# Patient Record
Sex: FEMALE | Race: WHITE | NOT HISPANIC OR LATINO | Employment: PART TIME | URBAN - METROPOLITAN AREA
[De-identification: names, ages, dates, MRNs, and addresses within clinical notes are randomized per-mention and may not be internally consistent; named-entity substitution may affect disease eponyms.]

---

## 2017-06-30 ENCOUNTER — OFFICE VISIT (OUTPATIENT)
Dept: FAMILY MEDICINE | Facility: CLINIC | Age: 52
End: 2017-06-30
Payer: COMMERCIAL

## 2017-06-30 ENCOUNTER — LAB VISIT (OUTPATIENT)
Dept: LAB | Facility: HOSPITAL | Age: 52
End: 2017-06-30
Attending: FAMILY MEDICINE
Payer: COMMERCIAL

## 2017-06-30 VITALS
OXYGEN SATURATION: 97 % | BODY MASS INDEX: 29.74 KG/M2 | RESPIRATION RATE: 14 BRPM | DIASTOLIC BLOOD PRESSURE: 78 MMHG | SYSTOLIC BLOOD PRESSURE: 100 MMHG | WEIGHT: 161.63 LBS | HEIGHT: 62 IN | TEMPERATURE: 98 F | HEART RATE: 61 BPM

## 2017-06-30 DIAGNOSIS — Z00.00 WELL WOMAN EXAM WITHOUT GYNECOLOGICAL EXAM: Primary | ICD-10-CM

## 2017-06-30 DIAGNOSIS — Z00.00 WELL WOMAN EXAM WITHOUT GYNECOLOGICAL EXAM: ICD-10-CM

## 2017-06-30 DIAGNOSIS — R63.5 WEIGHT GAIN, ABNORMAL: Primary | ICD-10-CM

## 2017-06-30 LAB
ALBUMIN SERPL BCP-MCNC: 3.9 G/DL
ALP SERPL-CCNC: 48 U/L
ALT SERPL W/O P-5'-P-CCNC: 15 U/L
ANION GAP SERPL CALC-SCNC: 5 MMOL/L
AST SERPL-CCNC: 21 U/L
BASOPHILS # BLD AUTO: 0.02 K/UL
BASOPHILS NFR BLD: 0.3 %
BILIRUB SERPL-MCNC: 0.4 MG/DL
BUN SERPL-MCNC: 12 MG/DL
CALCIUM SERPL-MCNC: 9.6 MG/DL
CHLORIDE SERPL-SCNC: 109 MMOL/L
CHOLEST/HDLC SERPL: 3.1 {RATIO}
CO2 SERPL-SCNC: 30 MMOL/L
CREAT SERPL-MCNC: 0.9 MG/DL
DIFFERENTIAL METHOD: ABNORMAL
EOSINOPHIL # BLD AUTO: 0.1 K/UL
EOSINOPHIL NFR BLD: 1.4 %
ERYTHROCYTE [DISTWIDTH] IN BLOOD BY AUTOMATED COUNT: 13.1 %
EST. GFR  (AFRICAN AMERICAN): >60 ML/MIN/1.73 M^2
EST. GFR  (NON AFRICAN AMERICAN): >60 ML/MIN/1.73 M^2
GLUCOSE SERPL-MCNC: 100 MG/DL
HCT VFR BLD AUTO: 38.5 %
HDL/CHOLESTEROL RATIO: 32.2 %
HDLC SERPL-MCNC: 227 MG/DL
HDLC SERPL-MCNC: 73 MG/DL
HGB BLD-MCNC: 12.4 G/DL
LDLC SERPL CALC-MCNC: 139.8 MG/DL
LYMPHOCYTES # BLD AUTO: 1.6 K/UL
LYMPHOCYTES NFR BLD: 23.3 %
MCH RBC QN AUTO: 31.2 PG
MCHC RBC AUTO-ENTMCNC: 32.2 %
MCV RBC AUTO: 97 FL
MONOCYTES # BLD AUTO: 0.6 K/UL
MONOCYTES NFR BLD: 9.5 %
NEUTROPHILS # BLD AUTO: 4.3 K/UL
NEUTROPHILS NFR BLD: 65.3 %
NONHDLC SERPL-MCNC: 154 MG/DL
PLATELET # BLD AUTO: 236 K/UL
PMV BLD AUTO: 11.2 FL
POTASSIUM SERPL-SCNC: 4.6 MMOL/L
PROT SERPL-MCNC: 7.1 G/DL
RBC # BLD AUTO: 3.98 M/UL
SODIUM SERPL-SCNC: 144 MMOL/L
T4 FREE SERPL-MCNC: 0.86 NG/DL
TRIGL SERPL-MCNC: 71 MG/DL
TSH SERPL DL<=0.005 MIU/L-ACNC: 1.57 UIU/ML
WBC # BLD AUTO: 6.64 K/UL

## 2017-06-30 PROCEDURE — 86803 HEPATITIS C AB TEST: CPT

## 2017-06-30 PROCEDURE — 85025 COMPLETE CBC W/AUTO DIFF WBC: CPT

## 2017-06-30 PROCEDURE — 80061 LIPID PANEL: CPT

## 2017-06-30 PROCEDURE — 36415 COLL VENOUS BLD VENIPUNCTURE: CPT | Mod: PN

## 2017-06-30 PROCEDURE — 84443 ASSAY THYROID STIM HORMONE: CPT

## 2017-06-30 PROCEDURE — 99999 PR PBB SHADOW E&M-EST. PATIENT-LVL III: CPT | Mod: PBBFAC,,, | Performed by: FAMILY MEDICINE

## 2017-06-30 PROCEDURE — 80053 COMPREHEN METABOLIC PANEL: CPT

## 2017-06-30 PROCEDURE — 84439 ASSAY OF FREE THYROXINE: CPT

## 2017-06-30 PROCEDURE — 99386 PREV VISIT NEW AGE 40-64: CPT | Mod: S$GLB,,, | Performed by: FAMILY MEDICINE

## 2017-06-30 NOTE — PROGRESS NOTES
"Well Woman VISIT      CHIEF COMPLAINT  Chief Complaint   Patient presents with    Establish Care    Foot Swelling     feet swelling at the end of the day.        HPI  Mary Blair is a 52 y.o. female who presents for physical.     Social Factors  Tobacco use: No  Ready to Quit: No  Alcohol: No  Intimate partner violence screening  "Do you feel safe in your current relationship?" Yes   "Have you ever been in a relationship in which your partner frightened you or hurt you?" No  Living Will/POA: No  Regular Exercise: Yes     Depression  Over the past two weeks, have you felt down, depressed, or hopeless? No  Over the past two weeks, have you felt little interest or pleasure in doing things? No    Reproductive Health  Followed by Dr. E. Labadie    CHD, HTN, DM2  CHD Risk Factors: none  Women 45 years and older should be screened for dyslipidemia if at increased risk of CHD  Women 20 to 45 years of age should be screened for dyslipidemia if at increased risk of CHD  Asymptomatic adults with sustained blood pressure greater than 135/80 mm Hg (treated or untreated) should be screened for type 2 diabetes mellitus    Estimated body mass index is 29.56 kg/m² as calculated from the following:    Height as of this encounter: 5' 2" (1.575 m).    Weight as of this encounter: 73.3 kg (161 lb 9.6 oz).      Screening  Mammogram needed: UTD per patient  Colonoscopy needed: UTD per patient  Osteoporosis screen needed: n/a     Women 50 to 74 years of age should be screened for breast cancer with mammography biennially.  Women should be screened for cervical cancer with Pap tests beginning at 21 years of age. Low-risk women should receive Pap testing every three years. Co-testing for human papillomavirus is an option beginning at 30 years of age, and can extend the screening interval to five years. Cervical cancer screening should be discontinued at 65 years of age or after total hysterectomy if the woman has a benign " "gynecologic history  Adults 50 to 75 years of age should be screened for colorectal cancer with an FOBT annually, sigmoidoscopy every five years with an FOBT every three years, or colonoscopy every 10 years.  Women 65 years and older should be screened for osteoporosis. Women younger than 65 years should be screened if the risk of fracture is greater than or equal to that of a 65-year-old white woman without additional risk factors.      Immunizations  stated as up to date, no records available    ALLERGIES and MEDS were verified.   PMHx, PSHx, FHx, SOCIALHx were updated as pertinent.    REVIEW OF SYSTEMS  Review of Systems   Constitutional: Negative.    HENT: Negative.    Eyes: Negative.    Respiratory: Negative.    Cardiovascular: Positive for leg swelling.   Gastrointestinal: Negative.    Genitourinary: Negative.    Musculoskeletal: Negative.    Skin: Negative.    Neurological: Negative.          PHYSICAL EXAM  VITAL SIGNS: /78 (BP Location: Left arm, Patient Position: Sitting, BP Method: Manual)   Pulse 61   Temp 98.1 °F (36.7 °C) (Oral)   Resp 14   Ht 5' 2" (1.575 m)   Wt 73.3 kg (161 lb 9.6 oz)   SpO2 97%   BMI 29.56 kg/m²   GEN: Well developed, Well nourished, No acute distress.  HENT: Normocephalic, Atraumatic, Bilateral external ears normal, Nose normal, Oropharynx moist, No oral exudates.   Eyes: PERRL, EOMI, Conjunctiva normal, No discharge.   Neck: Supple, No tenderness.  Lymphatic: No cervical or supraclavicular lymphadenopathy noted.   Cardiovascular: Normal heart rate, Normal rhythm, No murmurs, No rubs, No gallops.   Thorax & Lungs: Normal breath sounds, No respiratory distress, No wheezing.  Abdomen: Soft, No tenderness, Bowel sounds normal.  Breast: deferred  Genital:deferred   Skin: Warm, Dry, No erythema, No rash.   Extremities: No edema, No tenderness.       ASSESSMENT/PLAN    Mary was seen today for establish care and foot swelling.    Diagnoses and all orders for this " visit:    Well woman exam without gynecological exam  -     CBC auto differential; Future  -     Comprehensive metabolic panel; Future  -     Lipid panel; Future  -     TSH; Future  -     T4, free; Future  -     Hepatitis C antibody; Future  -     Urinalysis; Future           FOLLOW UP: 1 year or sooner if needed    Aldo Menard MD

## 2017-07-03 ENCOUNTER — PATIENT MESSAGE (OUTPATIENT)
Dept: FAMILY MEDICINE | Facility: CLINIC | Age: 52
End: 2017-07-03

## 2017-07-03 LAB — HCV AB SERPL QL IA: NEGATIVE

## 2018-01-05 DIAGNOSIS — Z12.11 COLON CANCER SCREENING: ICD-10-CM

## 2018-08-29 ENCOUNTER — OFFICE VISIT (OUTPATIENT)
Dept: OBSTETRICS AND GYNECOLOGY | Facility: CLINIC | Age: 53
End: 2018-08-29
Payer: COMMERCIAL

## 2018-08-29 VITALS
DIASTOLIC BLOOD PRESSURE: 78 MMHG | HEIGHT: 62 IN | SYSTOLIC BLOOD PRESSURE: 132 MMHG | WEIGHT: 168.63 LBS | BODY MASS INDEX: 31.03 KG/M2

## 2018-08-29 DIAGNOSIS — Z01.419 WELL WOMAN EXAM WITH ROUTINE GYNECOLOGICAL EXAM: Primary | ICD-10-CM

## 2018-08-29 DIAGNOSIS — Z78.0 MENOPAUSE: ICD-10-CM

## 2018-08-29 DIAGNOSIS — Z12.4 PAP SMEAR FOR CERVICAL CANCER SCREENING: ICD-10-CM

## 2018-08-29 PROCEDURE — 99386 PREV VISIT NEW AGE 40-64: CPT | Mod: S$GLB,,, | Performed by: OBSTETRICS & GYNECOLOGY

## 2018-08-29 PROCEDURE — 88175 CYTOPATH C/V AUTO FLUID REDO: CPT

## 2018-08-29 PROCEDURE — 99999 PR PBB SHADOW E&M-EST. PATIENT-LVL III: CPT | Mod: PBBFAC,,, | Performed by: OBSTETRICS & GYNECOLOGY

## 2018-08-29 NOTE — PROGRESS NOTES
"Subjective:       Patient ID: Mary Blair is a 53 y.o. female.    Chief Complaint:  Well Woman      History of Present Illness  HPI  This 53 yr old female is here for routine WWE and is new to me.  Was seeing Dr Labadie on [facility].   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    She had pap in last couple of yrs and just had mammogram that was normal.    She has not had a DVT but was seen in ER and ruled out recently  She had C/S x 4 and BTL  She has never had abnormal pap or mammogram  She is taking Duavee and has been on for 3 yrs  .  Heme /onc knows she is on Duavee.       GYN & OB History  No LMP recorded. Patient is postmenopausal.   Date of Last Pap: No result found    OB History   No data available       Review of Systems  Review of Systems   Constitutional: Negative for chills and fever.   Respiratory: Negative for shortness of breath.    Cardiovascular: Negative for chest pain.   Gastrointestinal: Negative for abdominal pain, nausea and vomiting.   Genitourinary: Negative for difficulty urinating, dyspareunia, genital sores, menstrual problem, pelvic pain, vaginal bleeding, vaginal discharge and vaginal pain.   Musculoskeletal: Positive for back pain.   Skin: Negative for wound.   Hematological: Negative for adenopathy.           Objective:   Physical Exam:   Constitutional: She is oriented to person, place, and time. She appears well-developed and well-nourished.    HENT:   Head: Normocephalic.    Eyes: EOM are normal.    Neck: Normal range of motion.    Cardiovascular: Normal rate.     Pulmonary/Chest: Effort normal. She exhibits no mass and no tenderness. Right breast exhibits no inverted nipple, no mass, no skin change and no tenderness. Left breast exhibits no inverted nipple, no mass, no skin change and no tenderness.        Abdominal: " Soft. She exhibits no distension. There is no tenderness.     Genitourinary: Vagina normal and uterus normal. There is no rash, tenderness or lesion on the right labia. There is no rash, tenderness or lesion on the left labia. Uterus is not tender. Cervix is normal. Right adnexum displays no mass, no tenderness and no fullness. Left adnexum displays no mass, no tenderness and no fullness. Cervix exhibits no discharge.           Musculoskeletal: Normal range of motion.       Neurological: She is alert and oriented to person, place, and time.    Skin: Skin is warm and dry.    Psychiatric: She has a normal mood and affect.          Assessment:        1. Well woman exam with routine gynecological exam    2. Pap smear for cervical cancer screening    3. Menopause               Plan:      Change to estratest and prometrium  Mammogram yearly  Pap every other yr.

## 2018-09-06 ENCOUNTER — PATIENT MESSAGE (OUTPATIENT)
Dept: OBSTETRICS AND GYNECOLOGY | Facility: CLINIC | Age: 53
End: 2018-09-06

## 2018-09-06 DIAGNOSIS — Z78.0 MENOPAUSE: Primary | ICD-10-CM

## 2018-09-06 RX ORDER — PROGESTERONE 100 MG/1
100 CAPSULE ORAL NIGHTLY
Qty: 90 CAPSULE | Refills: 3 | Status: SHIPPED | OUTPATIENT
Start: 2018-09-06 | End: 2019-01-29

## 2018-09-06 RX ORDER — ESTERIFIED ESTROGEN AND METHYLTESTOSTERONE 1.25; 2.5 MG/1; MG/1
1 TABLET ORAL DAILY
Qty: 30 TABLET | Refills: 5 | Status: SHIPPED | OUTPATIENT
Start: 2018-09-06 | End: 2018-09-07 | Stop reason: SDUPTHER

## 2018-09-07 DIAGNOSIS — Z78.0 MENOPAUSE: ICD-10-CM

## 2018-09-07 RX ORDER — ESTERIFIED ESTROGEN AND METHYLTESTOSTERONE 1.25; 2.5 MG/1; MG/1
1 TABLET ORAL DAILY
Qty: 30 TABLET | Refills: 5 | Status: SHIPPED | OUTPATIENT
Start: 2018-09-07 | End: 2019-01-29

## 2018-11-09 ENCOUNTER — TELEPHONE (OUTPATIENT)
Dept: OBSTETRICS AND GYNECOLOGY | Facility: CLINIC | Age: 53
End: 2018-11-09

## 2018-11-09 NOTE — TELEPHONE ENCOUNTER
----- Message from Venessa Vega sent at 11/9/2018  2:14 PM CST -----  Contact: ANDRES HORNER [[id number]]            Name of Who is Calling: ANDRES HORNER [[id number]]    What is the request in detail: Returning a call.      Can the clinic reply by [facility]: no      What Number to Call Back if not in [facility]: [phone]

## 2018-11-28 DIAGNOSIS — Z12.39 BREAST CANCER SCREENING: ICD-10-CM

## 2018-12-07 ENCOUNTER — LAB VISIT (OUTPATIENT)
Dept: LAB | Facility: HOSPITAL | Age: 53
End: 2018-12-07
Attending: FAMILY MEDICINE
Payer: COMMERCIAL

## 2018-12-07 DIAGNOSIS — Z12.11 COLON CANCER SCREENING: ICD-10-CM

## 2018-12-07 LAB — HEMOCCULT STL QL IA: NEGATIVE

## 2018-12-07 PROCEDURE — 82274 ASSAY TEST FOR BLOOD FECAL: CPT

## 2019-01-23 ENCOUNTER — TELEPHONE (OUTPATIENT)
Dept: HEMATOLOGY/ONCOLOGY | Facility: CLINIC | Age: 54
End: 2019-01-23

## 2019-01-23 NOTE — TELEPHONE ENCOUNTER
Received referral to Dr Perez from Dr Vieyra for plasmacytoma. Requested additional data which were faxed today.  Explained to Lila (RN at [facility]) that we will schedule with Blood & Marrow specialist.    Spoke with patient & scheduled for 1/29.  Gave directions. Notified referring office.  Questioned pt if she has had work up for hematuria.  Pt said urologist to do scope on the 28th and she had CT scan on 21st.    =========================  From: [email or web address]   Sent: Wednesday, January 23, 2019 10:02 AM  To: Cyndie Ruelas [email or web address]>  Subject:] Josh referral    Good morning. It is ok to schedule her with a different provider. I have attached all the labs and DI reports to this email (its a lot). If any more information is needed let me know. Thanks.    Lila Wagner RN  [facility]  Office: [phone]  Desk: [phone]  Fax: [phone]

## 2019-01-29 ENCOUNTER — LAB VISIT (OUTPATIENT)
Dept: LAB | Facility: HOSPITAL | Age: 54
End: 2019-01-29
Attending: STUDENT IN AN ORGANIZED HEALTH CARE EDUCATION/TRAINING PROGRAM
Payer: COMMERCIAL

## 2019-01-29 ENCOUNTER — INITIAL CONSULT (OUTPATIENT)
Dept: HEMATOLOGY/ONCOLOGY | Facility: CLINIC | Age: 54
End: 2019-01-29
Payer: COMMERCIAL

## 2019-01-29 VITALS
HEART RATE: 83 BPM | SYSTOLIC BLOOD PRESSURE: 141 MMHG | TEMPERATURE: 98 F | DIASTOLIC BLOOD PRESSURE: 84 MMHG | OXYGEN SATURATION: 95 % | RESPIRATION RATE: 18 BRPM | WEIGHT: 179.88 LBS | BODY MASS INDEX: 33.1 KG/M2 | HEIGHT: 62 IN

## 2019-01-29 DIAGNOSIS — C90.30 PLASMACYTOMA: ICD-10-CM

## 2019-01-29 DIAGNOSIS — C90.30 PLASMACYTOMA: Primary | ICD-10-CM

## 2019-01-29 DIAGNOSIS — M54.16 LUMBAR RADICULOPATHY, CHRONIC: ICD-10-CM

## 2019-01-29 DIAGNOSIS — G56.03 BILATERAL CARPAL TUNNEL SYNDROME: ICD-10-CM

## 2019-01-29 DIAGNOSIS — G95.9 CERVICAL MYELOPATHY: ICD-10-CM

## 2019-01-29 DIAGNOSIS — M54.9 BACK PAIN, UNSPECIFIED BACK LOCATION, UNSPECIFIED BACK PAIN LATERALITY, UNSPECIFIED CHRONICITY: ICD-10-CM

## 2019-01-29 DIAGNOSIS — Z92.3 HISTORY OF RADIATION EXPOSURE TO SPINE: ICD-10-CM

## 2019-01-29 DIAGNOSIS — M67.912 DYSFUNCTION OF LEFT ROTATOR CUFF: ICD-10-CM

## 2019-01-29 LAB
ALBUMIN SERPL BCP-MCNC: 3.6 G/DL
ALP SERPL-CCNC: 70 U/L
ALT SERPL W/O P-5'-P-CCNC: 14 U/L
ANION GAP SERPL CALC-SCNC: 8 MMOL/L
AST SERPL-CCNC: 19 U/L
BASOPHILS # BLD AUTO: 0.03 K/UL
BASOPHILS NFR BLD: 0.6 %
BILIRUB SERPL-MCNC: 0.2 MG/DL
BUN SERPL-MCNC: 12 MG/DL
CALCIUM SERPL-MCNC: 9.3 MG/DL
CHLORIDE SERPL-SCNC: 105 MMOL/L
CO2 SERPL-SCNC: 27 MMOL/L
CREAT SERPL-MCNC: 0.8 MG/DL
DIFFERENTIAL METHOD: ABNORMAL
EOSINOPHIL # BLD AUTO: 0.1 K/UL
EOSINOPHIL NFR BLD: 1.1 %
ERYTHROCYTE [DISTWIDTH] IN BLOOD BY AUTOMATED COUNT: 13 %
EST. GFR  (AFRICAN AMERICAN): >60 ML/MIN/1.73 M^2
EST. GFR  (NON AFRICAN AMERICAN): >60 ML/MIN/1.73 M^2
GLUCOSE SERPL-MCNC: 105 MG/DL
HCT VFR BLD AUTO: 37.6 %
HGB BLD-MCNC: 12.4 G/DL
IGA SERPL-MCNC: 71 MG/DL
IGG SERPL-MCNC: 763 MG/DL
IGM SERPL-MCNC: 97 MG/DL
IMM GRANULOCYTES # BLD AUTO: 0.01 K/UL
IMM GRANULOCYTES NFR BLD AUTO: 0.2 %
LYMPHOCYTES # BLD AUTO: 0.9 K/UL
LYMPHOCYTES NFR BLD: 16.4 %
MAGNESIUM SERPL-MCNC: 2.1 MG/DL
MCH RBC QN AUTO: 31.2 PG
MCHC RBC AUTO-ENTMCNC: 33 G/DL
MCV RBC AUTO: 95 FL
MONOCYTES # BLD AUTO: 0.5 K/UL
MONOCYTES NFR BLD: 10.1 %
NEUTROPHILS # BLD AUTO: 3.8 K/UL
NEUTROPHILS NFR BLD: 71.6 %
NRBC BLD-RTO: 0 /100 WBC
PHOSPHATE SERPL-MCNC: 4.4 MG/DL
PLATELET # BLD AUTO: 226 K/UL
PMV BLD AUTO: 9.8 FL
POTASSIUM SERPL-SCNC: 4.1 MMOL/L
PROT SERPL-MCNC: 6.9 G/DL
RBC # BLD AUTO: 3.97 M/UL
SODIUM SERPL-SCNC: 140 MMOL/L
WBC # BLD AUTO: 5.25 K/UL

## 2019-01-29 PROCEDURE — 84165 PROTEIN E-PHORESIS SERUM: CPT

## 2019-01-29 PROCEDURE — 36415 COLL VENOUS BLD VENIPUNCTURE: CPT

## 2019-01-29 PROCEDURE — 3008F BODY MASS INDEX DOCD: CPT | Mod: CPTII,S$GLB,, | Performed by: STUDENT IN AN ORGANIZED HEALTH CARE EDUCATION/TRAINING PROGRAM

## 2019-01-29 PROCEDURE — 84165 PATHOLOGIST INTERPRETATION SPE: ICD-10-PCS | Mod: 26,,, | Performed by: PATHOLOGY

## 2019-01-29 PROCEDURE — 99204 PR OFFICE/OUTPT VISIT, NEW, LEVL IV, 45-59 MIN: ICD-10-PCS | Mod: S$GLB,,, | Performed by: STUDENT IN AN ORGANIZED HEALTH CARE EDUCATION/TRAINING PROGRAM

## 2019-01-29 PROCEDURE — 84100 ASSAY OF PHOSPHORUS: CPT

## 2019-01-29 PROCEDURE — 84165 PROTEIN E-PHORESIS SERUM: CPT | Mod: 26,,, | Performed by: PATHOLOGY

## 2019-01-29 PROCEDURE — 86334 IMMUNOFIX E-PHORESIS SERUM: CPT | Mod: 26,,, | Performed by: PATHOLOGY

## 2019-01-29 PROCEDURE — 86334 IMMUNOFIX E-PHORESIS SERUM: CPT

## 2019-01-29 PROCEDURE — 99999 PR PBB SHADOW E&M-EST. PATIENT-LVL III: CPT | Mod: PBBFAC,,, | Performed by: STUDENT IN AN ORGANIZED HEALTH CARE EDUCATION/TRAINING PROGRAM

## 2019-01-29 PROCEDURE — 86334 PATHOLOGIST INTERPRETATION IFE: ICD-10-PCS | Mod: 26,,, | Performed by: PATHOLOGY

## 2019-01-29 PROCEDURE — 85025 COMPLETE CBC W/AUTO DIFF WBC: CPT

## 2019-01-29 PROCEDURE — 80053 COMPREHEN METABOLIC PANEL: CPT

## 2019-01-29 PROCEDURE — 99204 OFFICE O/P NEW MOD 45 MIN: CPT | Mod: S$GLB,,, | Performed by: STUDENT IN AN ORGANIZED HEALTH CARE EDUCATION/TRAINING PROGRAM

## 2019-01-29 PROCEDURE — 99999 PR PBB SHADOW E&M-EST. PATIENT-LVL III: ICD-10-PCS | Mod: PBBFAC,,, | Performed by: STUDENT IN AN ORGANIZED HEALTH CARE EDUCATION/TRAINING PROGRAM

## 2019-01-29 PROCEDURE — 83735 ASSAY OF MAGNESIUM: CPT

## 2019-01-29 PROCEDURE — 3008F PR BODY MASS INDEX (BMI) DOCUMENTED: ICD-10-PCS | Mod: CPTII,S$GLB,, | Performed by: STUDENT IN AN ORGANIZED HEALTH CARE EDUCATION/TRAINING PROGRAM

## 2019-01-29 PROCEDURE — 82784 ASSAY IGA/IGD/IGG/IGM EACH: CPT | Mod: 59

## 2019-01-29 PROCEDURE — 83520 IMMUNOASSAY QUANT NOS NONAB: CPT | Mod: 59

## 2019-01-29 RX ORDER — DULOXETIN HYDROCHLORIDE 60 MG/1
60 CAPSULE, DELAYED RELEASE ORAL DAILY
COMMUNITY
Start: 2018-09-26

## 2019-01-29 RX ORDER — DULOXETIN HYDROCHLORIDE 60 MG/1
CAPSULE, DELAYED RELEASE ORAL
COMMUNITY
Start: 2018-11-14 | End: 2019-01-29

## 2019-01-29 RX ORDER — CEFUROXIME AXETIL 500 MG/1
TABLET ORAL
Refills: 0 | COMMUNITY
Start: 2019-01-17 | End: 2019-01-29

## 2019-01-29 RX ORDER — OMEPRAZOLE 20 MG/1
CAPSULE, DELAYED RELEASE ORAL
Refills: 3 | COMMUNITY
Start: 2019-01-14 | End: 2019-01-29

## 2019-01-29 RX ORDER — ESTERIFIED ESTROGEN AND METHYLTESTOSTERONE 1.25; 2.5 MG/1; MG/1
1 TABLET ORAL DAILY
COMMUNITY
End: 2019-02-19

## 2019-01-29 RX ORDER — PROPRANOLOL HYDROCHLORIDE 10 MG/1
10 TABLET ORAL DAILY
Refills: 0 | COMMUNITY
Start: 2018-11-16 | End: 2019-01-29

## 2019-01-29 NOTE — MEDICAL/APP STUDENT
"Subjective:       Patient ID: Mary Blair is a 53 y.o. female.    Chief Complaint: No chief complaint on file.    HPI     Mary Blair is a 53M previously followed at [facility] with plasmacytoma diagnosed in     here to establish care at [facility].    2018 - NM bone/joint whole body   - Increased uptake within L3 body corresponding to lytic lesion seen on 18 CT  3/8/2018 - Bone marrow biopsy negative   3/8/2018 - MRI   3/21/2018 - Skeletal survey   - "Periprosthetic lucency associated with the left proximal femur potentially reflecting osteolysis rather than malignancy"    March-May 2018   - Patient had ratiation threapy to spine.  Window includes L3, L4, and L5 due to suspicion for invasion of adjacent vertebrae.    2018 - L-spine   - no suspicious osseous lesions   - posterior spinal fusion L2-L4.  2018 - MIRNA + UPEP   - Total protein urine: 77 (nl. )   - Normal immunofixation pattern     - K+L    Kappa: 6.42    Lambda: 0.20    K:L: 32.1  2018 - CT abdo/pelvis  2018 - CT Thoracic Spine W/WO   - No suspicious osseous lesions identified.    2018 Labs   - B2 microglobulin:  1.8 mg/L (ref 0.6-2.4)   - CBC:    WBC: 3.8    Hb: 12.5    Plt: 199   - CMP:    All wnl    Ca: 9.4     - Immunoglobulins:    Ig Ref 700-1600    IgM: 103 Ref     IgA: 81.4 Ref        - LDH: 178 ()   - SPEP    Total protein normal.  Apparent normal immunofixation pattern   - Kappa/Joaquín    Kappa: 8.5    Lambda: 10.6    K:L ratio: 0.8   - CRP <0.3   - ESR 20  18 - MRI C-spine WO   - No focal lesions observed  18 - MRI L-Spine W/WO   - No focal lesions observed  18 - MRI brain W/Wo   - Normal study  10/24/2018   - Kappa: 7.6   - Lambda: 10.6   - K:L ratio: 0.72    2019 Last outside note shows patient still has back pains, and has blood in urine.              Review of Systems    Objective:      Physical Exam    Assessment:       No diagnosis found.    Plan:     "   Medical student note

## 2019-01-29 NOTE — Clinical Note
Blood work today including CBC, CMP, Mg, Phos, Immunofix, spep, free LT chains,  immunoglobulinsSchedule for PET/CT Follow up in 2 weeks Feb 12, 2019

## 2019-01-29 NOTE — PROGRESS NOTES
"PATIENT: Mary Blair  MRN: [id number]  DATE: 2/3/2019      Diagnosis:   1. Plasmacytoma    2. Back pain, unspecified back location, unspecified back pain laterality, unspecified chronicity    3. History of radiation exposure to spine    4. Bilateral carpal tunnel syndrome    5. Cervical myelopathy    6. Dysfunction of left rotator cuff    7. Lumbar radiculopathy, chronic        Chief Complaint: Plastacytoma; Results; and Pain (back. pain scale 5/10)      Mary Blair is a 53M with PMHx of plasmacytoma of the lumbar spine previously treated at [facility] with plasmacytoma presented to our hematology clinic to establish care.    She initially presented with back pain in early 2018 and underwent bone scan on 2018 which revealed Increased uptake within L3 body corresponding to lytic lesion seen on 18 CT and underwent back surgery on 2018. A bone marrow biopsy done on 3/8/2018 was negative for any malignancy. However the biopsy of L3 vertebral body lesion revealed lambda restricted plasma cell neoplasm and skeletal survey done on 3/21/2018 revealed "Periprosthetic lucency associated with the left proximal femur potentially reflecting osteolysis rather than malignancy". She underwent ratiation threapy to L3, L4 and L5 of spine from March to May 8, 2018 for a total of 8 weeks due to suspicion for invasion of adjacent vertebrae.     2018 xray of L-spine revealed no suspicious osseous lesions and intact posterior spinal fusion L2-L4.  2018 - MIRNA + UPEP- Normal immunofixation pattern  2018 - CT abdo/pelvis- NO suspicious osseous lesions identified  2018 - CT Thoracic Spine W/WO - No suspicious osseous lesions identified.    2018 - B2 microglobulin:  1.8 mg/L (ref 0.6-2.4). CBC, CMP unremarkable   - Immunoglobulins:                          Ig      Ref 700-1600                          IgM:     103      Ref                           IgA:      81.4     Ref "                                         - LDH: 178 ()              - SPEP- Total protein normal.  Apparent normal immunofixation pattern              - Kappa/Joaquín                          Kappa: 8.5                          Lambda: 10.6                          K:L ratio: 0.8              - CRP <0.3              - ESR 20    18 - MRI C-spine WO - No focal lesions observed  18 - MRI L-Spine W/WO - No focal lesions observed  18 - MRI brain W/Wo- Normal study  10/24/2018 -  Normal immunoglobulins, LDH. Normal SPEP and MIRNA. No M spike seen.              - Kappa: 7.6              - Lambda: 10.6              - K:L ratio: 0.72     She c/o chronic back pain, denies any new complaints.     She has PMHx of L hip replacements in  and , B/L carpal tunnel s/p surgery, R rotator cuff tear and trigger finger. She is a non smoker and drinks alcohol socially    She has family history of skin cancer and lung in grand mother    Subjective:    Initial History: Ms. Blair is a 53 y.o. female who returns for follow up.     Past Medical History:   Past Medical History:   Diagnosis Date    Rotator cuff disorder     Spinal stenosis        Past Surgical HIstory:   Past Surgical History:   Procedure Laterality Date     SECTION      corpal tunnel      lubar sugery      ROTATOR CUFF REPAIR      TONSILLECTOMY      TOTAL HIP ARTHROPLASTY         Family History:   Family History   Problem Relation Age of Onset    Heart disease Mother     Heart disease Father        Social History:  reports that  has never smoked. she has never used smokeless tobacco. She reports that she does not use drugs.    Allergies:  Review of patient's allergies indicates:  No Known Allergies    Medications:  Current Outpatient Medications   Medication Sig Dispense Refill    DULoxetine (CYMBALTA) 60 MG capsule Take 60 mg by mouth once daily.       estrogens,conjugated,-methyltestosterone 1.25-2.5mg (ESTRATEST) 1.25-2.5  "mg per tablet Take 1 tablet by mouth once daily.       No current facility-administered medications for this visit.        Review of Systems   Constitutional: Negative for appetite change, chills, fatigue, fever and unexpected weight change.   HENT: Negative for nosebleeds.    Respiratory: Negative for cough and shortness of breath.    Cardiovascular: Negative for chest pain.   Gastrointestinal: Negative for abdominal pain, blood in stool, nausea and vomiting.   Genitourinary: Negative for dyspareunia, dysuria, flank pain, frequency and hematuria.   Musculoskeletal: Positive for back pain. Negative for myalgias.   Skin: Negative for rash.   Neurological: Negative for seizures, syncope and headaches.   Hematological: Negative for adenopathy. Does not bruise/bleed easily.   Psychiatric/Behavioral: Negative for agitation and behavioral problems.       ECOG Performance Status: 0   Objective:      Vitals:   Vitals:    01/29/19 1416   BP: (!) 141/84   BP Location: Left arm   Patient Position: Sitting   BP Method: Medium (Automatic)   Pulse: 83   Resp: 18   Temp: 98.1 °F (36.7 °C)   TempSrc: Oral   SpO2: 95%   Weight: 81.6 kg (179 lb 14.3 oz)   Height: 5' 2" (1.575 m)     BMI: Body mass index is 32.9 kg/m².    Physical Exam   Constitutional: She is oriented to person, place, and time. She appears well-developed and well-nourished.   HENT:   Head: Normocephalic and atraumatic.   Eyes: EOM are normal. Pupils are equal, round, and reactive to light.   Neck: Normal range of motion. Neck supple.   Cardiovascular: Normal rate and regular rhythm.   Pulmonary/Chest: Effort normal and breath sounds normal. No respiratory distress.   Abdominal: Soft. Bowel sounds are normal. She exhibits no distension. There is no tenderness.   Musculoskeletal: Normal range of motion. She exhibits no edema, tenderness or deformity.   Neurological: She is alert and oriented to person, place, and time. No cranial nerve deficit.   Skin: Skin is warm. " No rash noted.   Psychiatric: She has a normal mood and affect. Her behavior is normal.       Laboratory Data:  Lab Visit on 01/29/2019   Component Date Value Ref Range Status    Kappa Free Light Chains 01/29/2019 0.70  0.33 - 1.94 mg/dL Final    Lambda Free Light Chains 01/29/2019 1.03  0.57 - 2.63 mg/dL Final    Kappa/Lambda FLC Ratio 01/29/2019 0.68  0.26 - 1.65 Final    IgG - Serum 01/29/2019 763  650 - 1600 mg/dL Final    IgG Cord Blood Reference Range: 650-1600 mg/dL.    IgA 01/29/2019 71  40 - 350 mg/dL Final    IgA Cord Blood Reference Range: <5 mg/dL.    IgM 01/29/2019 97  50 - 300 mg/dL Final    IgM Cord Blood Reference Range: <25 mg/dL.    Immunofix Interp. 01/29/2019 SEE COMMENT   Final    Comment: Serum protein electrophoresis and immunofixation results should be   interpreted in clinical context in that some therapeutic agents can   result   in false positive results (example, daratumumab). Correlation with   the   patient s therapeutic regimen is required.  See pathologist's interpretation.      Protein, Serum 01/29/2019 6.6  6.0 - 8.4 g/dL Final    Comment: Serum protein electrophoresis and immunofixation results should be   interpreted in clinical context in that some therapeutic agents can   result   in false positive results (example, daratumumab). Correlation with   the   patient s therapeutic regimen is required.      Albumin grams/dl 01/29/2019 4.03  3.35 - 5.55 g/dL Final    Alpha-1 grams/dl 01/29/2019 0.36  0.17 - 0.41 g/dL Final    Alpha-2 grams/dl 01/29/2019 0.79  0.43 - 0.99 g/dL Final    Beta grams/dl 01/29/2019 0.67  0.50 - 1.10 g/dL Final    Gamma grams/dl 01/29/2019 0.75  0.67 - 1.58 g/dL Final    WBC 01/29/2019 5.25  3.90 - 12.70 K/uL Final    RBC 01/29/2019 3.97* 4.00 - 5.40 M/uL Final    Hemoglobin 01/29/2019 12.4  12.0 - 16.0 g/dL Final    Hematocrit 01/29/2019 37.6  37.0 - 48.5 % Final    MCV 01/29/2019 95  82 - 98 fL Final    MCH 01/29/2019 31.2* 27.0 - 31.0 pg  Final    MCHC 01/29/2019 33.0  32.0 - 36.0 g/dL Final    RDW 01/29/2019 13.0  11.5 - 14.5 % Final    Platelets 01/29/2019 226  150 - 350 K/uL Final    MPV 01/29/2019 9.8  9.2 - 12.9 fL Final    Immature Granulocytes 01/29/2019 0.2  0.0 - 0.5 % Final    Gran # (ANC) 01/29/2019 3.8  1.8 - 7.7 K/uL Final    Immature Grans (Abs) 01/29/2019 0.01  0.00 - 0.04 K/uL Final    Comment: Mild elevation in immature granulocytes is non specific and   can be seen in a variety of conditions including stress response,   acute inflammation, trauma and pregnancy. Correlation with other   laboratory and clinical findings is essential.      Lymph # 01/29/2019 0.9* 1.0 - 4.8 K/uL Final    Mono # 01/29/2019 0.5  0.3 - 1.0 K/uL Final    Eos # 01/29/2019 0.1  0.0 - 0.5 K/uL Final    Baso # 01/29/2019 0.03  0.00 - 0.20 K/uL Final    nRBC 01/29/2019 0  0 /100 WBC Final    Gran% 01/29/2019 71.6  38.0 - 73.0 % Final    Lymph% 01/29/2019 16.4* 18.0 - 48.0 % Final    Mono% 01/29/2019 10.1  4.0 - 15.0 % Final    Eosinophil% 01/29/2019 1.1  0.0 - 8.0 % Final    Basophil% 01/29/2019 0.6  0.0 - 1.9 % Final    Differential Method 01/29/2019 Automated   Final    Sodium 01/29/2019 140  136 - 145 mmol/L Final    Potassium 01/29/2019 4.1  3.5 - 5.1 mmol/L Final    Chloride 01/29/2019 105  95 - 110 mmol/L Final    CO2 01/29/2019 27  23 - 29 mmol/L Final    Glucose 01/29/2019 105  70 - 110 mg/dL Final    BUN, Bld 01/29/2019 12  6 - 20 mg/dL Final    Creatinine 01/29/2019 0.8  0.5 - 1.4 mg/dL Final    Calcium 01/29/2019 9.3  8.7 - 10.5 mg/dL Final    Total Protein 01/29/2019 6.9  6.0 - 8.4 g/dL Final    Albumin 01/29/2019 3.6  3.5 - 5.2 g/dL Final    Total Bilirubin 01/29/2019 0.2  0.1 - 1.0 mg/dL Final    Comment: For infants and newborns, interpretation of results should be based  on gestational age, weight and in agreement with clinical  observations.  Premature Infant recommended reference ranges:  Up to 24  hours.............<8.0 mg/dL  Up to 48 hours............<12.0 mg/dL  3-5 days..................<15.0 mg/dL  6-29 days.................<15.0 mg/dL      Alkaline Phosphatase 01/29/2019 70  55 - 135 U/L Final    AST 01/29/2019 19  10 - 40 U/L Final    ALT 01/29/2019 14  10 - 44 U/L Final    Anion Gap 01/29/2019 8  8 - 16 mmol/L Final    eGFR if African American 01/29/2019 >60.0  >60 mL/min/1.73 m^2 Final    eGFR if non African American 01/29/2019 >60.0  >60 mL/min/1.73 m^2 Final    Comment: Calculation used to obtain the estimated glomerular filtration  rate (eGFR) is the CKD-EPI equation.       Magnesium 01/29/2019 2.1  1.6 - 2.6 mg/dL Final    Phosphorus 01/29/2019 4.4  2.7 - 4.5 mg/dL Final    Pathologist Interpretation MIRNA 01/29/2019 REVIEWED   Final    Comment: Electronically reviewed and signed by:  Silvio Aalmo M.D.  Signed on 01/30/19 at 15:32  No monoclonal peaks identified.      Pathologist Interpretation SPE 01/29/2019 REVIEWED   Final    Comment: Electronically reviewed and signed by:  Silvio Alamo M.D.  Signed on 01/30/19 at 15:32  Normal total protein, normal pattern.           Imaging:    Assessment:       1. Plasmacytoma    2. Back pain, unspecified back location, unspecified back pain laterality, unspecified chronicity    3. History of radiation exposure to spine    4. Bilateral carpal tunnel syndrome    5. Cervical myelopathy    6. Dysfunction of left rotator cuff    7. Lumbar radiculopathy, chronic      Plasmacytoma s/p radiation treatments to L3 to L5  She does not have any CRAB signs  Previous MM studies have been negative  Previous imaging has been negative for any lytic lesions     Plan:     1. We will repeat MM studies today and every 4 months  2. We will get repeat PET CT today and will repeat PET CT yearly as follow up  3. On Cymbalta for chronic back pain     Will follow up after 2-3 weeks after PET results    Plan of care discussed with Dr. Dominic Dunlap MD  PGY-IV  Hematology and Oncology  Pager:[phone]    Distress Screening Results: Psychosocial Distress screening score of Distress Score: 2 noted and reviewed. No intervention indicated.

## 2019-01-30 LAB
ALBUMIN SERPL ELPH-MCNC: 4.03 G/DL
ALPHA1 GLOB SERPL ELPH-MCNC: 0.36 G/DL
ALPHA2 GLOB SERPL ELPH-MCNC: 0.79 G/DL
B-GLOBULIN SERPL ELPH-MCNC: 0.67 G/DL
GAMMA GLOB SERPL ELPH-MCNC: 0.75 G/DL
INTERPRETATION SERPL IFE-IMP: NORMAL
KAPPA LC SER QL IA: 0.7 MG/DL
KAPPA LC/LAMBDA SER IA: 0.68
LAMBDA LC SER QL IA: 1.03 MG/DL
PATHOLOGIST INTERPRETATION IFE: NORMAL
PATHOLOGIST INTERPRETATION SPE: NORMAL
PROT SERPL-MCNC: 6.6 G/DL

## 2019-02-03 PROBLEM — C90.30 PLASMACYTOMA: Status: ACTIVE | Noted: 2019-02-03

## 2019-02-14 ENCOUNTER — HOSPITAL ENCOUNTER (OUTPATIENT)
Dept: RADIOLOGY | Facility: HOSPITAL | Age: 54
Discharge: HOME | End: 2019-02-14
Attending: STUDENT IN AN ORGANIZED HEALTH CARE EDUCATION/TRAINING PROGRAM
Payer: COMMERCIAL

## 2019-02-14 DIAGNOSIS — M54.9 BACK PAIN, UNSPECIFIED BACK LOCATION, UNSPECIFIED BACK PAIN LATERALITY, UNSPECIFIED CHRONICITY: ICD-10-CM

## 2019-02-14 DIAGNOSIS — Z92.3 HISTORY OF RADIATION EXPOSURE TO SPINE: ICD-10-CM

## 2019-02-14 DIAGNOSIS — C90.30 PLASMACYTOMA: ICD-10-CM

## 2019-02-14 DIAGNOSIS — G56.03 BILATERAL CARPAL TUNNEL SYNDROME: ICD-10-CM

## 2019-02-14 PROCEDURE — A9552 F18 FDG: HCPCS

## 2019-02-14 PROCEDURE — 78815 PET IMAGE W/CT SKULL-THIGH: CPT | Mod: 26,PI,, | Performed by: RADIOLOGY

## 2019-02-14 PROCEDURE — 78815 NM PET CT ROUTINE: ICD-10-PCS | Mod: 26,PI,, | Performed by: RADIOLOGY

## 2019-02-14 PROCEDURE — 78815 PET IMAGE W/CT SKULL-THIGH: CPT | Mod: TC

## 2019-02-19 ENCOUNTER — OFFICE VISIT (OUTPATIENT)
Dept: HEMATOLOGY/ONCOLOGY | Facility: CLINIC | Age: 54
End: 2019-02-19
Payer: COMMERCIAL

## 2019-02-19 VITALS
WEIGHT: 180.31 LBS | HEIGHT: 62 IN | RESPIRATION RATE: 18 BRPM | SYSTOLIC BLOOD PRESSURE: 119 MMHG | BODY MASS INDEX: 33.18 KG/M2 | TEMPERATURE: 98 F | DIASTOLIC BLOOD PRESSURE: 56 MMHG | HEART RATE: 73 BPM | OXYGEN SATURATION: 95 %

## 2019-02-19 DIAGNOSIS — G56.03 BILATERAL CARPAL TUNNEL SYNDROME: ICD-10-CM

## 2019-02-19 DIAGNOSIS — Z92.3 HISTORY OF RADIATION EXPOSURE TO SPINE: ICD-10-CM

## 2019-02-19 DIAGNOSIS — C90.30 PLASMACYTOMA: Primary | ICD-10-CM

## 2019-02-19 DIAGNOSIS — G95.9 CERVICAL MYELOPATHY: ICD-10-CM

## 2019-02-19 DIAGNOSIS — M54.9 BACK PAIN, UNSPECIFIED BACK LOCATION, UNSPECIFIED BACK PAIN LATERALITY, UNSPECIFIED CHRONICITY: ICD-10-CM

## 2019-02-19 PROCEDURE — 99214 OFFICE O/P EST MOD 30 MIN: CPT | Mod: S$GLB,,, | Performed by: STUDENT IN AN ORGANIZED HEALTH CARE EDUCATION/TRAINING PROGRAM

## 2019-02-19 PROCEDURE — 3008F BODY MASS INDEX DOCD: CPT | Mod: CPTII,S$GLB,, | Performed by: STUDENT IN AN ORGANIZED HEALTH CARE EDUCATION/TRAINING PROGRAM

## 2019-02-19 PROCEDURE — 3008F PR BODY MASS INDEX (BMI) DOCUMENTED: ICD-10-PCS | Mod: CPTII,S$GLB,, | Performed by: STUDENT IN AN ORGANIZED HEALTH CARE EDUCATION/TRAINING PROGRAM

## 2019-02-19 PROCEDURE — 99999 PR PBB SHADOW E&M-EST. PATIENT-LVL III: CPT | Mod: PBBFAC,,, | Performed by: STUDENT IN AN ORGANIZED HEALTH CARE EDUCATION/TRAINING PROGRAM

## 2019-02-19 PROCEDURE — 99214 PR OFFICE/OUTPT VISIT, EST, LEVL IV, 30-39 MIN: ICD-10-PCS | Mod: S$GLB,,, | Performed by: STUDENT IN AN ORGANIZED HEALTH CARE EDUCATION/TRAINING PROGRAM

## 2019-02-19 PROCEDURE — 99999 PR PBB SHADOW E&M-EST. PATIENT-LVL III: ICD-10-PCS | Mod: PBBFAC,,, | Performed by: STUDENT IN AN ORGANIZED HEALTH CARE EDUCATION/TRAINING PROGRAM

## 2019-02-19 RX ORDER — SUCRALFATE 1 G/1
TABLET ORAL
Refills: 6 | Status: ON HOLD | COMMUNITY
Start: 2019-02-16 | End: 2019-08-31

## 2019-02-19 NOTE — PROGRESS NOTES
"PATIENT: Mary Blair  MRN: [id number]  DATE: 2019      Diagnosis:   1. Plasmacytoma    2. Back pain, unspecified back location, unspecified back pain laterality, unspecified chronicity    3. History of radiation exposure to spine    4. Bilateral carpal tunnel syndrome    5. Cervical myelopathy        Chief Complaint: Plasmacytoma; Results; and Pain (3-4/10)       Mary Blair is a 53M with PMHx of plasmacytoma of the lumbar spine previously treated at [facility] with plasmacytoma presented to our hematology clinic to establish care.     She initially presented with back pain in early 2018 and underwent bone scan on 2018 which revealed Increased uptake within L3 body corresponding to lytic lesion seen on 18 CT and underwent back surgery on 2018. A bone marrow biopsy done on 3/8/2018 was negative for any malignancy. However the biopsy of L3 vertebral body lesion revealed lambda restricted plasma cell neoplasm and skeletal survey done on 3/21/2018 revealed "Periprosthetic lucency associated with the left proximal femur potentially reflecting osteolysis rather than malignancy". She underwent ratiation threapy to L3, L4 and L5 of spine from March to May 8, 2018 for a total of 8 weeks due to suspicion for invasion of adjacent vertebrae.      2018 xray of L-spine revealed no suspicious osseous lesions and intact posterior spinal fusion L2-L4.  2018 - MIRNA + UPEP- Normal immunofixation pattern  2018 - CT abdo/pelvis- NO suspicious osseous lesions identified  2018 - CT Thoracic Spine W/WO - No suspicious osseous lesions identified.    2018 - B2 microglobulin:  1.8 mg/L (ref 0.6-2.4). CBC, CMP unremarkable   - Immunoglobulins:                          Ig      Ref 700-1600                          IgM:     103      Ref                           IgA:      81.4     Ref                  - LDH: 178 ()              - SPEP- Total protein normal. "  Apparent normal immunofixation pattern              - Kappa/Joaquín                          Kappa: 8.5                          Lambda: 10.6                          K:L ratio: 0.8              - CRP <0.3              - ESR 20     18 - MRI C-spine WO - No focal lesions observed  18 - MRI L-Spine W/WO - No focal lesions observed  18 - MRI brain W/Wo- Normal study  10/24/2018 -  Normal immunoglobulins, LDH. Normal SPEP and MIRNA. No M spike seen.              - Kappa: 7.6              - Lambda: 10.6              - K:L ratio: 0.72     She c/o chronic back pain, denies any new complaints.      She has PMHx of L hip replacements in  and , B/L carpal tunnel s/p surgery, R rotator cuff tear and trigger finger. She is a non smoker and drinks alcohol socially     She has family history of skin cancer and lung in grand mother    Follow up 2019  - C/o chronic back pain controlled with cymbalta  - Currently on cymbalta for fibromyalgia  - Her recent myeloma labs including SPEP, Immunoglobulins, free LT chains, MIRNA are normal  - 2019- No FDG PET/CT findings to suggest recurrent or metastatic disease  - c/o vaginal bleeding 3-4 episodes and is scheduled to see GYN for a transvaginal US and biopsy      Subjective:    Initial History: Ms. Blair is a 53 y.o. female who returns for follow up.     Past Medical History:   Past Medical History:   Diagnosis Date    Rotator cuff disorder     Spinal stenosis        Past Surgical HIstory:   Past Surgical History:   Procedure Laterality Date     SECTION      corpal tunnel      lubar sugery      ROTATOR CUFF REPAIR      TONSILLECTOMY      TOTAL HIP ARTHROPLASTY         Family History:   Family History   Problem Relation Age of Onset    Heart disease Mother     Heart disease Father        Social History:  reports that  has never smoked. she has never used smokeless tobacco. She reports that she does not use drugs.    Allergies:  Review of  "patient's allergies indicates:  No Known Allergies    Medications:  Current Outpatient Medications   Medication Sig Dispense Refill    conj estrogens-bazedoxifene (DUAVEE) 0.45-20 mg Tab       DULoxetine (CYMBALTA) 60 MG capsule Take 60 mg by mouth once daily.       sucralfate (CARAFATE) 1 gram tablet TAKE ONE TABLET BY MOUTH THREE TIMES DAILY BEFORE MEALS AND AT BEDTIME  6     No current facility-administered medications for this visit.        Review of Systems   Constitutional: Negative for appetite change, chills, fatigue, fever and unexpected weight change.   HENT: Negative for nosebleeds and sore throat.    Respiratory: Negative for cough and shortness of breath.    Cardiovascular: Negative for chest pain and palpitations.   Gastrointestinal: Negative for abdominal pain, blood in stool, diarrhea and vomiting.   Genitourinary: Positive for vaginal bleeding. Negative for dysuria, flank pain and frequency.   Musculoskeletal: Positive for back pain. Negative for joint swelling.   Skin: Negative for color change and rash.   Neurological: Negative for seizures, syncope and headaches.   Hematological: Negative for adenopathy.       ECOG Performance Status: 1   Objective:      Vitals:   Vitals:    02/19/19 1511   BP: (!) 119/56   BP Location: Right arm   Patient Position: Sitting   BP Method: Large (Automatic)   Pulse: 73   Resp: 18   Temp: 98.4 °F (36.9 °C)   TempSrc: Oral   SpO2: 95%   Weight: 81.8 kg (180 lb 5.4 oz)   Height: 5' 2" (1.575 m)     BMI: Body mass index is 32.98 kg/m².    Physical Exam   Constitutional: She is oriented to person, place, and time. She appears well-developed and well-nourished.   HENT:   Head: Normocephalic and atraumatic.   Eyes: EOM are normal. Pupils are equal, round, and reactive to light.   Neck: Normal range of motion. Neck supple.   Cardiovascular: Normal rate, regular rhythm and normal heart sounds.   Pulmonary/Chest: Effort normal and breath sounds normal. No respiratory " distress.   Abdominal: Soft. Bowel sounds are normal. She exhibits no distension. There is no tenderness.   Musculoskeletal: Normal range of motion. She exhibits no edema, tenderness or deformity.   Neurological: She is alert and oriented to person, place, and time.   Skin: Skin is warm and dry. Capillary refill takes less than 2 seconds. No pallor.       Laboratory Data:  No visits with results within 1 Week(s) from this visit.   Latest known visit with results is:   Lab Visit on 01/29/2019   Component Date Value Ref Range Status    Kappa Free Light Chains 01/29/2019 0.70  0.33 - 1.94 mg/dL Final    Lambda Free Light Chains 01/29/2019 1.03  0.57 - 2.63 mg/dL Final    Kappa/Lambda FLC Ratio 01/29/2019 0.68  0.26 - 1.65 Final    IgG - Serum 01/29/2019 763  650 - 1600 mg/dL Final    IgG Cord Blood Reference Range: 650-1600 mg/dL.    IgA 01/29/2019 71  40 - 350 mg/dL Final    IgA Cord Blood Reference Range: <5 mg/dL.    IgM 01/29/2019 97  50 - 300 mg/dL Final    IgM Cord Blood Reference Range: <25 mg/dL.    Immunofix Interp. 01/29/2019 SEE COMMENT   Final    Comment: Serum protein electrophoresis and immunofixation results should be   interpreted in clinical context in that some therapeutic agents can   result   in false positive results (example, daratumumab). Correlation with   the   patient s therapeutic regimen is required.  See pathologist's interpretation.      Protein, Serum 01/29/2019 6.6  6.0 - 8.4 g/dL Final    Comment: Serum protein electrophoresis and immunofixation results should be   interpreted in clinical context in that some therapeutic agents can   result   in false positive results (example, daratumumab). Correlation with   the   patient s therapeutic regimen is required.      Albumin grams/dl 01/29/2019 4.03  3.35 - 5.55 g/dL Final    Alpha-1 grams/dl 01/29/2019 0.36  0.17 - 0.41 g/dL Final    Alpha-2 grams/dl 01/29/2019 0.79  0.43 - 0.99 g/dL Final    Beta grams/dl 01/29/2019 0.67  0.50  - 1.10 g/dL Final    Gamma grams/dl 01/29/2019 0.75  0.67 - 1.58 g/dL Final    WBC 01/29/2019 5.25  3.90 - 12.70 K/uL Final    RBC 01/29/2019 3.97* 4.00 - 5.40 M/uL Final    Hemoglobin 01/29/2019 12.4  12.0 - 16.0 g/dL Final    Hematocrit 01/29/2019 37.6  37.0 - 48.5 % Final    MCV 01/29/2019 95  82 - 98 fL Final    MCH 01/29/2019 31.2* 27.0 - 31.0 pg Final    MCHC 01/29/2019 33.0  32.0 - 36.0 g/dL Final    RDW 01/29/2019 13.0  11.5 - 14.5 % Final    Platelets 01/29/2019 226  150 - 350 K/uL Final    MPV 01/29/2019 9.8  9.2 - 12.9 fL Final    Immature Granulocytes 01/29/2019 0.2  0.0 - 0.5 % Final    Gran # (ANC) 01/29/2019 3.8  1.8 - 7.7 K/uL Final    Immature Grans (Abs) 01/29/2019 0.01  0.00 - 0.04 K/uL Final    Comment: Mild elevation in immature granulocytes is non specific and   can be seen in a variety of conditions including stress response,   acute inflammation, trauma and pregnancy. Correlation with other   laboratory and clinical findings is essential.      Lymph # 01/29/2019 0.9* 1.0 - 4.8 K/uL Final    Mono # 01/29/2019 0.5  0.3 - 1.0 K/uL Final    Eos # 01/29/2019 0.1  0.0 - 0.5 K/uL Final    Baso # 01/29/2019 0.03  0.00 - 0.20 K/uL Final    nRBC 01/29/2019 0  0 /100 WBC Final    Gran% 01/29/2019 71.6  38.0 - 73.0 % Final    Lymph% 01/29/2019 16.4* 18.0 - 48.0 % Final    Mono% 01/29/2019 10.1  4.0 - 15.0 % Final    Eosinophil% 01/29/2019 1.1  0.0 - 8.0 % Final    Basophil% 01/29/2019 0.6  0.0 - 1.9 % Final    Differential Method 01/29/2019 Automated   Final    Sodium 01/29/2019 140  136 - 145 mmol/L Final    Potassium 01/29/2019 4.1  3.5 - 5.1 mmol/L Final    Chloride 01/29/2019 105  95 - 110 mmol/L Final    CO2 01/29/2019 27  23 - 29 mmol/L Final    Glucose 01/29/2019 105  70 - 110 mg/dL Final    BUN, Bld 01/29/2019 12  6 - 20 mg/dL Final    Creatinine 01/29/2019 0.8  0.5 - 1.4 mg/dL Final    Calcium 01/29/2019 9.3  8.7 - 10.5 mg/dL Final    Total Protein 01/29/2019  6.9  6.0 - 8.4 g/dL Final    Albumin 01/29/2019 3.6  3.5 - 5.2 g/dL Final    Total Bilirubin 01/29/2019 0.2  0.1 - 1.0 mg/dL Final    Comment: For infants and newborns, interpretation of results should be based  on gestational age, weight and in agreement with clinical  observations.  Premature Infant recommended reference ranges:  Up to 24 hours.............<8.0 mg/dL  Up to 48 hours............<12.0 mg/dL  3-5 days..................<15.0 mg/dL  6-29 days.................<15.0 mg/dL      Alkaline Phosphatase 01/29/2019 70  55 - 135 U/L Final    AST 01/29/2019 19  10 - 40 U/L Final    ALT 01/29/2019 14  10 - 44 U/L Final    Anion Gap 01/29/2019 8  8 - 16 mmol/L Final    eGFR if African American 01/29/2019 >60.0  >60 mL/min/1.73 m^2 Final    eGFR if non African American 01/29/2019 >60.0  >60 mL/min/1.73 m^2 Final    Comment: Calculation used to obtain the estimated glomerular filtration  rate (eGFR) is the CKD-EPI equation.       Magnesium 01/29/2019 2.1  1.6 - 2.6 mg/dL Final    Phosphorus 01/29/2019 4.4  2.7 - 4.5 mg/dL Final    Pathologist Interpretation MIRNA 01/29/2019 REVIEWED   Final    Comment: Electronically reviewed and signed by:  Silvio Alamo M.D.  Signed on 01/30/19 at 15:32  No monoclonal peaks identified.      Pathologist Interpretation SPE 01/29/2019 REVIEWED   Final    Comment: Electronically reviewed and signed by:  Silvio Alamo M.D.  Signed on 01/30/19 at 15:32  Normal total protein, normal pattern.           Imaging:    Assessment:       1. Plasmacytoma    2. Back pain, unspecified back location, unspecified back pain laterality, unspecified chronicity    3. History of radiation exposure to spine    4. Bilateral carpal tunnel syndrome    5. Cervical myelopathy      Plasmacytoma s/p radiation treatments to L3 to L5  She does not have any CRAB signs  Previous and current MM studies have been negative  Previous imaging has been negative for any lytic lesions  2/14/2019- No FDG PET/CT findings  to suggest recurrent or metastatic disease       Plan:     1. Will follow up in 6 months with labs and myeloma workup  2. Agree with GYN workup for vaginal/uterine bleeding. Scheduled to see GYN in 2 weeks    Plan of care discussed with Dr. Stephanie Dunlap MD PGY-IV  Hematology and Oncology  Pager:[phone]    Distress Screening Results: Psychosocial Distress screening score of Distress Score: 0 noted and reviewed. No intervention indicated.

## 2019-05-13 ENCOUNTER — PATIENT MESSAGE (OUTPATIENT)
Dept: ADMINISTRATIVE | Facility: HOSPITAL | Age: 54
End: 2019-05-13

## 2019-05-13 NOTE — LETTER
May 13, 2019      [facility]             [facility]  [address]  [facility] [address]  Phone: [phone] May 13, 2019     Patient: Mary Blair    YOB: 1965   Date of Visit: 5/13/2019       To Whom It May Concern:                                             We are seeing Mary Blair in the clinic at [facility].  Aldo Menard MD is their PCP.  She/He has an outstanding lab/procedure at the time we reviewed their chart.  To help with our Health Maintenance records will you please supply the following report(s):      [x]  Mammogram                                                []  Colonoscopy   []  Pap Smear                                                   []  Outside Lab Results   []  Dexa scans                                                   []  Eye Exam   []  Foot Exam                                                     [] Other___________   []  Outside Immunizations                                               Please Fax to [facility] at [phone].    Thank you for your help. If my Care Coordinator can be of any assistance, you can call Krissy Schafer MA-[facility] at [phone].

## 2019-08-21 ENCOUNTER — PATIENT MESSAGE (OUTPATIENT)
Dept: HEMATOLOGY/ONCOLOGY | Facility: CLINIC | Age: 54
End: 2019-08-21

## 2019-08-30 ENCOUNTER — HOSPITAL ENCOUNTER (OUTPATIENT)
Facility: HOSPITAL | Age: 54
Discharge: HOME | DRG: 872 | End: 2019-09-02
Attending: EMERGENCY MEDICINE | Admitting: INTERNAL MEDICINE
Payer: COMMERCIAL

## 2019-08-30 ENCOUNTER — TELEPHONE (OUTPATIENT)
Dept: HEMATOLOGY/ONCOLOGY | Facility: CLINIC | Age: 54
End: 2019-08-30

## 2019-08-30 DIAGNOSIS — A41.9 SEPSIS DUE TO URINARY TRACT INFECTION: ICD-10-CM

## 2019-08-30 DIAGNOSIS — N39.0 SEPSIS DUE TO URINARY TRACT INFECTION: ICD-10-CM

## 2019-08-30 DIAGNOSIS — N12 PYELONEPHRITIS: Primary | ICD-10-CM

## 2019-08-30 DIAGNOSIS — A41.50 GRAM NEGATIVE SEPSIS: ICD-10-CM

## 2019-08-30 PROBLEM — C90.30 PLASMACYTOMA OF BONE: Status: ACTIVE | Noted: 2019-02-05

## 2019-08-30 LAB
AMORPH CRY UR QL COMP ASSIST: ABNORMAL
ANION GAP SERPL CALC-SCNC: 10 MMOL/L (ref 8–16)
BACTERIA #/AREA URNS AUTO: ABNORMAL /HPF
BASOPHILS # BLD AUTO: 0.04 K/UL (ref 0–0.2)
BASOPHILS NFR BLD: 0.3 % (ref 0–1.9)
BILIRUB UR QL STRIP: NEGATIVE
BUN SERPL-MCNC: 12 MG/DL (ref 6–20)
CALCIUM SERPL-MCNC: 9.7 MG/DL (ref 8.7–10.5)
CHLORIDE SERPL-SCNC: 104 MMOL/L (ref 95–110)
CLARITY UR REFRACT.AUTO: ABNORMAL
CO2 SERPL-SCNC: 24 MMOL/L (ref 23–29)
COLOR UR AUTO: YELLOW
CREAT SERPL-MCNC: 1 MG/DL (ref 0.5–1.4)
DIFFERENTIAL METHOD: ABNORMAL
EOSINOPHIL # BLD AUTO: 0 K/UL (ref 0–0.5)
EOSINOPHIL NFR BLD: 0 % (ref 0–8)
ERYTHROCYTE [DISTWIDTH] IN BLOOD BY AUTOMATED COUNT: 13.2 % (ref 11.5–14.5)
EST. GFR  (AFRICAN AMERICAN): >60 ML/MIN/1.73 M^2
EST. GFR  (NON AFRICAN AMERICAN): >60 ML/MIN/1.73 M^2
GLUCOSE SERPL-MCNC: 118 MG/DL (ref 70–110)
GLUCOSE UR QL STRIP: NEGATIVE
HCT VFR BLD AUTO: 38.3 % (ref 37–48.5)
HGB BLD-MCNC: 12.7 G/DL (ref 12–16)
HGB UR QL STRIP: ABNORMAL
HYALINE CASTS UR QL AUTO: 0 /LPF
IMM GRANULOCYTES # BLD AUTO: 0.07 K/UL (ref 0–0.04)
IMM GRANULOCYTES NFR BLD AUTO: 0.5 % (ref 0–0.5)
KETONES UR QL STRIP: ABNORMAL
LACTATE SERPL-SCNC: 0.7 MMOL/L (ref 0.5–2.2)
LEUKOCYTE ESTERASE UR QL STRIP: ABNORMAL
LYMPHOCYTES # BLD AUTO: 0.7 K/UL (ref 1–4.8)
LYMPHOCYTES NFR BLD: 4.7 % (ref 18–48)
MCH RBC QN AUTO: 31.2 PG (ref 27–31)
MCHC RBC AUTO-ENTMCNC: 33.2 G/DL (ref 32–36)
MCV RBC AUTO: 94 FL (ref 82–98)
MICROSCOPIC COMMENT: ABNORMAL
MONOCYTES # BLD AUTO: 1.6 K/UL (ref 0.3–1)
MONOCYTES NFR BLD: 10.9 % (ref 4–15)
NEUTROPHILS # BLD AUTO: 11.9 K/UL (ref 1.8–7.7)
NEUTROPHILS NFR BLD: 83.6 % (ref 38–73)
NITRITE UR QL STRIP: NEGATIVE
NRBC BLD-RTO: 0 /100 WBC
PH UR STRIP: 7 [PH] (ref 5–8)
PLATELET # BLD AUTO: 203 K/UL (ref 150–350)
PMV BLD AUTO: 10.3 FL (ref 9.2–12.9)
POTASSIUM SERPL-SCNC: 3.7 MMOL/L (ref 3.5–5.1)
PROCALCITONIN SERPL IA-MCNC: 0.83 NG/ML
PROCALCITONIN SERPL IA-MCNC: 0.94 NG/ML
PROT UR QL STRIP: ABNORMAL
RBC # BLD AUTO: 4.07 M/UL (ref 4–5.4)
RBC #/AREA URNS AUTO: 19 /HPF (ref 0–4)
SODIUM SERPL-SCNC: 138 MMOL/L (ref 136–145)
SP GR UR STRIP: 1.01 (ref 1–1.03)
SQUAMOUS #/AREA URNS AUTO: 1 /HPF
URN SPEC COLLECT METH UR: ABNORMAL
WBC # BLD AUTO: 14.27 K/UL (ref 3.9–12.7)
WBC #/AREA URNS AUTO: >100 /HPF (ref 0–5)

## 2019-08-30 PROCEDURE — 99223 PR INITIAL HOSPITAL CARE,LEVL III: ICD-10-PCS | Mod: ,,, | Performed by: INTERNAL MEDICINE

## 2019-08-30 PROCEDURE — 99285 PR EMERGENCY DEPT VISIT,LEVEL V: ICD-10-PCS | Mod: ,,, | Performed by: PHYSICIAN ASSISTANT

## 2019-08-30 PROCEDURE — 99285 EMERGENCY DEPT VISIT HI MDM: CPT | Mod: 25

## 2019-08-30 PROCEDURE — 96375 TX/PRO/DX INJ NEW DRUG ADDON: CPT

## 2019-08-30 PROCEDURE — 87088 URINE BACTERIA CULTURE: CPT

## 2019-08-30 PROCEDURE — 63600175 PHARM REV CODE 636 W HCPCS: Performed by: EMERGENCY MEDICINE

## 2019-08-30 PROCEDURE — 96376 TX/PRO/DX INJ SAME DRUG ADON: CPT

## 2019-08-30 PROCEDURE — 63600175 PHARM REV CODE 636 W HCPCS: Performed by: PHYSICIAN ASSISTANT

## 2019-08-30 PROCEDURE — G0378 HOSPITAL OBSERVATION PER HR: HCPCS

## 2019-08-30 PROCEDURE — 96365 THER/PROPH/DIAG IV INF INIT: CPT

## 2019-08-30 PROCEDURE — 85025 COMPLETE CBC W/AUTO DIFF WBC: CPT

## 2019-08-30 PROCEDURE — 99223 1ST HOSP IP/OBS HIGH 75: CPT | Mod: ,,, | Performed by: INTERNAL MEDICINE

## 2019-08-30 PROCEDURE — 81001 URINALYSIS AUTO W/SCOPE: CPT

## 2019-08-30 PROCEDURE — 96361 HYDRATE IV INFUSION ADD-ON: CPT

## 2019-08-30 PROCEDURE — 87077 CULTURE AEROBIC IDENTIFY: CPT

## 2019-08-30 PROCEDURE — 11000001 HC ACUTE MED/SURG PRIVATE ROOM

## 2019-08-30 PROCEDURE — 87186 SC STD MICRODIL/AGAR DIL: CPT | Mod: 59

## 2019-08-30 PROCEDURE — 83605 ASSAY OF LACTIC ACID: CPT

## 2019-08-30 PROCEDURE — 80048 BASIC METABOLIC PNL TOTAL CA: CPT

## 2019-08-30 PROCEDURE — 25000003 PHARM REV CODE 250: Performed by: PHYSICIAN ASSISTANT

## 2019-08-30 PROCEDURE — 87086 URINE CULTURE/COLONY COUNT: CPT

## 2019-08-30 PROCEDURE — 84145 PROCALCITONIN (PCT): CPT

## 2019-08-30 PROCEDURE — 99285 EMERGENCY DEPT VISIT HI MDM: CPT | Mod: ,,, | Performed by: PHYSICIAN ASSISTANT

## 2019-08-30 PROCEDURE — 87040 BLOOD CULTURE FOR BACTERIA: CPT

## 2019-08-30 RX ORDER — ENOXAPARIN SODIUM 100 MG/ML
40 INJECTION SUBCUTANEOUS EVERY 24 HOURS
Status: DISCONTINUED | OUTPATIENT
Start: 2019-08-31 | End: 2019-09-02 | Stop reason: HOSPADM

## 2019-08-30 RX ORDER — RAMELTEON 8 MG/1
8 TABLET ORAL NIGHTLY PRN
Status: DISCONTINUED | OUTPATIENT
Start: 2019-08-30 | End: 2019-09-02 | Stop reason: HOSPADM

## 2019-08-30 RX ORDER — OXYCODONE HYDROCHLORIDE 5 MG/1
5 TABLET ORAL EVERY 4 HOURS PRN
Status: DISCONTINUED | OUTPATIENT
Start: 2019-08-31 | End: 2019-09-02 | Stop reason: HOSPADM

## 2019-08-30 RX ORDER — OMEPRAZOLE 40 MG/1
40 CAPSULE, DELAYED RELEASE ORAL DAILY
Status: ON HOLD | COMMUNITY
End: 2019-08-31

## 2019-08-30 RX ORDER — ACETAMINOPHEN 325 MG/1
650 TABLET ORAL EVERY 8 HOURS PRN
Status: DISCONTINUED | OUTPATIENT
Start: 2019-08-30 | End: 2019-08-31

## 2019-08-30 RX ORDER — GLUCAGON 1 MG
1 KIT INJECTION
Status: DISCONTINUED | OUTPATIENT
Start: 2019-08-30 | End: 2019-09-02 | Stop reason: HOSPADM

## 2019-08-30 RX ORDER — ACETAMINOPHEN 325 MG/1
650 TABLET ORAL
Status: COMPLETED | OUTPATIENT
Start: 2019-08-30 | End: 2019-08-30

## 2019-08-30 RX ORDER — KETOROLAC TROMETHAMINE 30 MG/ML
10 INJECTION, SOLUTION INTRAMUSCULAR; INTRAVENOUS
Status: COMPLETED | OUTPATIENT
Start: 2019-08-30 | End: 2019-08-30

## 2019-08-30 RX ORDER — IBUPROFEN 400 MG/1
400 TABLET ORAL
Status: DISCONTINUED | OUTPATIENT
Start: 2019-08-31 | End: 2019-09-01

## 2019-08-30 RX ORDER — METOCLOPRAMIDE HYDROCHLORIDE 5 MG/ML
10 INJECTION INTRAMUSCULAR; INTRAVENOUS
Status: COMPLETED | OUTPATIENT
Start: 2019-08-30 | End: 2019-08-30

## 2019-08-30 RX ORDER — ONDANSETRON 8 MG/1
8 TABLET, ORALLY DISINTEGRATING ORAL EVERY 8 HOURS PRN
Status: DISCONTINUED | OUTPATIENT
Start: 2019-08-30 | End: 2019-09-02 | Stop reason: HOSPADM

## 2019-08-30 RX ORDER — OXYCODONE HYDROCHLORIDE 10 MG/1
10 TABLET ORAL EVERY 4 HOURS PRN
Status: DISCONTINUED | OUTPATIENT
Start: 2019-08-31 | End: 2019-09-02 | Stop reason: HOSPADM

## 2019-08-30 RX ORDER — IBUPROFEN 200 MG
16 TABLET ORAL
Status: DISCONTINUED | OUTPATIENT
Start: 2019-08-30 | End: 2019-09-02 | Stop reason: HOSPADM

## 2019-08-30 RX ORDER — IBUPROFEN 200 MG
24 TABLET ORAL
Status: DISCONTINUED | OUTPATIENT
Start: 2019-08-30 | End: 2019-09-02 | Stop reason: HOSPADM

## 2019-08-30 RX ORDER — ONDANSETRON 2 MG/ML
8 INJECTION INTRAMUSCULAR; INTRAVENOUS
Status: COMPLETED | OUTPATIENT
Start: 2019-08-30 | End: 2019-08-30

## 2019-08-30 RX ORDER — DULOXETIN HYDROCHLORIDE 20 MG/1
60 CAPSULE, DELAYED RELEASE ORAL DAILY
Status: DISCONTINUED | OUTPATIENT
Start: 2019-08-31 | End: 2019-09-02 | Stop reason: HOSPADM

## 2019-08-30 RX ORDER — PANTOPRAZOLE SODIUM 40 MG/1
40 TABLET, DELAYED RELEASE ORAL DAILY
Status: DISCONTINUED | OUTPATIENT
Start: 2019-08-31 | End: 2019-09-02 | Stop reason: HOSPADM

## 2019-08-30 RX ORDER — SODIUM CHLORIDE 0.9 % (FLUSH) 0.9 %
10 SYRINGE (ML) INJECTION
Status: DISCONTINUED | OUTPATIENT
Start: 2019-08-30 | End: 2019-09-02 | Stop reason: HOSPADM

## 2019-08-30 RX ORDER — SODIUM CHLORIDE 9 MG/ML
INJECTION, SOLUTION INTRAVENOUS CONTINUOUS
Status: DISCONTINUED | OUTPATIENT
Start: 2019-08-31 | End: 2019-08-31

## 2019-08-30 RX ORDER — CEFTRIAXONE 1 G/1
1 INJECTION, POWDER, FOR SOLUTION INTRAMUSCULAR; INTRAVENOUS
Status: DISCONTINUED | OUTPATIENT
Start: 2019-08-30 | End: 2019-08-30

## 2019-08-30 RX ORDER — ONDANSETRON 2 MG/ML
4 INJECTION INTRAMUSCULAR; INTRAVENOUS
Status: COMPLETED | OUTPATIENT
Start: 2019-08-30 | End: 2019-08-30

## 2019-08-30 RX ADMIN — SODIUM CHLORIDE, SODIUM LACTATE, POTASSIUM CHLORIDE, AND CALCIUM CHLORIDE 1000 ML: .6; .31; .03; .02 INJECTION, SOLUTION INTRAVENOUS at 05:08

## 2019-08-30 RX ADMIN — ONDANSETRON 4 MG: 2 INJECTION INTRAMUSCULAR; INTRAVENOUS at 07:08

## 2019-08-30 RX ADMIN — ACETAMINOPHEN 650 MG: 325 TABLET ORAL at 03:08

## 2019-08-30 RX ADMIN — CEFTRIAXONE 2 G: 2 INJECTION, SOLUTION INTRAVENOUS at 03:08

## 2019-08-30 RX ADMIN — KETOROLAC TROMETHAMINE 10 MG: 30 INJECTION, SOLUTION INTRAMUSCULAR at 07:08

## 2019-08-30 RX ADMIN — KETOROLAC TROMETHAMINE 10 MG: 30 INJECTION, SOLUTION INTRAMUSCULAR at 03:08

## 2019-08-30 RX ADMIN — SODIUM CHLORIDE 1000 ML: 0.9 INJECTION, SOLUTION INTRAVENOUS at 03:08

## 2019-08-30 RX ADMIN — METOCLOPRAMIDE 10 MG: 5 INJECTION, SOLUTION INTRAMUSCULAR; INTRAVENOUS at 04:08

## 2019-08-30 RX ADMIN — ONDANSETRON 8 MG: 2 INJECTION INTRAMUSCULAR; INTRAVENOUS at 03:08

## 2019-08-30 RX ADMIN — SODIUM CHLORIDE, SODIUM LACTATE, POTASSIUM CHLORIDE, AND CALCIUM CHLORIDE 1000 ML: .6; .31; .03; .02 INJECTION, SOLUTION INTRAVENOUS at 04:08

## 2019-08-30 NOTE — PROVIDER PROGRESS NOTES - EMERGENCY DEPT.
Encounter Date: 8/30/2019    ED Physician Progress Notes        Physician Note:   ED Physician Hand-off Note:    ED Course: I assumed care of patient from off-going ED physician team. Briefly, Patient is a plasmacytoma of the bone s/p radiation presents for flank pain, hematuria, suprapubic pain, nausea and fever for 4 days.      5:37 PM.  At the time of signout plan was pending response in BP to fluids. I reassess patient. She appearing very uncomfortable due to flank and suprapubic cramping. She reports family hx of nephrolithiasis in brother and nephew, but no personal history. Will order CT renal stone study to assess for nephrolithiasis.    7:52 PM.  Patient moved to ED bed 23.  She has a fever of 100.2.  Her blood pressure has improved to 133/77 mmHg, HR in 102 bpm.  CT renal stone study results pending.  Will give Toradol and Zofran IV and continue to monitor.    8:21 PM  Disposition: CT renal stone study shows mild nonspecific right-sided perinephric and periureteral inflammatory stranding, which could correlate with symptomatology, either from a recently passed stone no longer seen within the urinary tract or right-sided pyeloureteronephritis in the proper clinical setting.  No radiodense nephrolithiasis or hydronephrosis.  Case discussed with Hospital Medicine who will admit patient for further evaluation and management of urosepsis.    Medications given in the ED:    Medications  lactated ringers bolus 1,000 mL (1,000 mLs Intravenous New Bag 8/30/19 1715)   ondansetron injection 8 mg (8 mg Intravenous Given 8/30/19 1515)  ketorolac injection 9.999 mg (9.999 mg Intravenous Given 8/30/19 1517)  sodium chloride 0.9% bolus 1,000 mL (0 mLs Intravenous Stopped 8/30/19 1618)   acetaminophen tablet 650 mg (650 mg Oral Given 8/30/19 1517)  cefTRIAXone (ROCEPHIN) 2 g in dextrose 5 % 50 mL IVPB (0 g Intravenous Stopped 8/30/19 1617)  metoclopramide HCl injection 10 mg (10 mg Intravenous Given 8/30/19 1617)  lactated  ringers bolus 1,000 mL (1,000 mLs Intravenous New Bag 8/30/19 6687)        Patient comfortable with plan. Patient counseled regarding exam, results, diagnosis, treatment, and plan.    Impression: urosepsis        Kat Brown PA-C  Emergent Department  [facility]  Spectralink #[id number] or #[id number]

## 2019-08-30 NOTE — ED NOTES
Fluids placed on a pump to infuse more quickly. Cycling her blood pressure.  sitting at her side. Will continue to monitor.

## 2019-08-30 NOTE — TELEPHONE ENCOUNTER
Spoke to patient.  Feels like she has a kidney infection,  Having temp of 104.  Instructed her to go to the ER or urgent care to be seen. Verbalized understanding

## 2019-08-30 NOTE — ED TRIAGE NOTES
C/o back pain , pelvic pain , nausea  and fever. Onset 3 days ago. Started on  A few bactrim and cipro that she had left over. Having body aches. Has been having frequent  Infections. Urine is cloudy  Yellow. Reports intermittent  hematuria. .

## 2019-08-30 NOTE — ED NOTES
LOC: The patient is awake and alert; oriented x 3 and speaking appropriately.  APPEARANCE: Patient resting comfortably, patient is clean and well groomed  SKIN: warm and dry, normal skin turgor & moist mucus membranes, skin intact, no breakdown noted.Had T 104.0  MUSCULOSKELETAL: Patient moving all extremities well, no obvious swelling or deformities noted, c/o generalized body aches- onset 3 days ago  RESPIRATORY: Airway is open and patent, respirations are spontaneous, normal effort and rate  CARDIAC: Patient has a normal rate, no peripheral edema noted, capillary refill < 3 seconds; No complaints of chest pain   ABDOMEN: Soft and non tender to palpation, no distention noted.Having urinary frequency and cloudy urine.

## 2019-08-30 NOTE — ED PROVIDER NOTES
Encounter Date: 2019       History     Chief Complaint   Patient presents with    Female  Problem     fevers, back pain, nausea,urinating often had blood in it     54-year-old female with plasmacytoma of the bone s/p radiation presents for flank pain, hematuria, suprapubic pain, nausea and fever for 4 days.  She reports fever with T-max of a 104° F with myalgias and chills. She has been taking leftover antibiotics that she had at home, states that she been taking Bactrim for 4 days then one dose Cipro without relief.  She has also been taking Tylenol round-the-clock, last dose at 12:30 p.m..  She denies chest pain, shortness of breath or cough.        Review of patient's allergies indicates:  No Known Allergies  Past Medical History:   Diagnosis Date    Cancer     Plasmacytoma     Rotator cuff disorder     Spinal stenosis      Past Surgical History:   Procedure Laterality Date     SECTION      corpal tunnel      lubar sugery      ROTATOR CUFF REPAIR      TONSILLECTOMY      TOTAL HIP ARTHROPLASTY       Family History   Problem Relation Age of Onset    Heart disease Mother     Heart disease Father      Social History     Tobacco Use    Smoking status: Never Smoker    Smokeless tobacco: Never Used   Substance Use Topics    Alcohol use: Not on file    Drug use: No     Review of Systems   Constitutional: Positive for chills, fatigue and fever. Negative for diaphoresis.   Respiratory: Negative for cough and shortness of breath.    Cardiovascular: Negative for chest pain and palpitations.   Gastrointestinal: Positive for abdominal pain and nausea. Negative for abdominal distention, anal bleeding, blood in stool, constipation, diarrhea, rectal pain and vomiting.   Endocrine: Negative for polydipsia and polyuria.   Genitourinary: Positive for flank pain, frequency, hematuria, pelvic pain and urgency. Negative for difficulty urinating and dysuria.   Musculoskeletal: Positive for back pain and  myalgias. Negative for arthralgias, gait problem, joint swelling, neck pain and neck stiffness.   Skin: Negative for color change and wound.   Neurological: Positive for headaches. Negative for light-headedness.   Hematological: Negative for adenopathy. Does not bruise/bleed easily.       Physical Exam     Initial Vitals [08/30/19 1410]   BP Pulse Resp Temp SpO2   106/63 (!) 115 18 (!) 101.6 °F (38.7 °C) 96 %      MAP       --         Physical Exam    Vitals reviewed.  Constitutional: She is not diaphoretic. She appears ill. No distress.    at bedside   HENT:   Head: Normocephalic and atraumatic.   Eyes: EOM are normal. Pupils are equal, round, and reactive to light.   Neck: Normal range of motion. Neck supple.   Cardiovascular: Regular rhythm, normal heart sounds and intact distal pulses. Exam reveals no gallop and no friction rub.    No murmur heard.  Tachycardic   Pulmonary/Chest: Breath sounds normal. No respiratory distress. She has no wheezes. She has no rhonchi. She has no rales. She exhibits no tenderness.   Abdominal: Soft. Bowel sounds are normal. She exhibits no distension and no mass. There is tenderness. There is no rebound and no guarding.   Mild suprapubic tenderness to palpation. +Bilateral CVA tenderness   Musculoskeletal: Normal range of motion.   Neurological: She is alert and oriented to person, place, and time.   Skin: Skin is warm and dry.   Psychiatric: She has a normal mood and affect.         ED Course   Procedures  Labs Reviewed   URINALYSIS, REFLEX TO URINE CULTURE - Abnormal; Notable for the following components:       Result Value    Appearance, UA Cloudy (*)     Protein, UA 2+ (*)     Ketones, UA 1+ (*)     Occult Blood UA 1+ (*)     Leukocytes, UA 3+ (*)     All other components within normal limits    Narrative:     Preferred Collection Type->Urine, Clean Catch   CBC W/ AUTO DIFFERENTIAL - Abnormal; Notable for the following components:    WBC 14.27 (*)     Mean Corpuscular  Hemoglobin 31.2 (*)     Gran # (ANC) 11.9 (*)     Immature Grans (Abs) 0.07 (*)     Lymph # 0.7 (*)     Mono # 1.6 (*)     Gran% 83.6 (*)     Lymph% 4.7 (*)     All other components within normal limits   BASIC METABOLIC PANEL - Abnormal; Notable for the following components:    Glucose 118 (*)     All other components within normal limits   URINALYSIS MICROSCOPIC - Abnormal; Notable for the following components:    RBC, UA 19 (*)     WBC, UA >100 (*)     Bacteria Many (*)     Amorphous, UA Many (*)     All other components within normal limits    Narrative:     Preferred Collection Type->Urine, Clean Catch   CULTURE, URINE   CULTURE, BLOOD   CULTURE, BLOOD   LACTIC ACID, PLASMA   PROCALCITONIN          Imaging Results    None          Medical Decision Making:   History:   Old Medical Records: I decided to obtain old medical records.  Old Records Summarized: records from previous admission(s) and records from clinic visits.       <> Summary of Records: Patient followed by Dr. Dunlap for plasmacytoma.  No prior urine cultures on file.  Initial Assessment:   54-year-old female presenting for fever, suprapubic pain, nausea/vomiting and flank pain. On ED arrival, she is febrile to 101.6° F with last dose of Tylenol 2 hr prior.  She is tachycardic with heart rate of 115.  Otherwise normal vitals.  She appears ill.  Abdomen shows only minimal suprapubic as well as CVA tenderness.  Differential Diagnosis:   Pyelonephritis  UTI  Dehydration  Sepsis less likely      Clinical Tests:   Lab Tests: Ordered and Reviewed  Radiological Study: Ordered  ED Management:  54-year-old female presenting for nausea, vomiting flank pain and fever.  She is febrile to 101.6° F and ill-appearing.  Suspect symptoms due to pyelonephritis, will check labs, give fluids, empiric ceftriaxone, Zofran, Toradol, Tylenol reassess.    Patient reports some improvement of symptoms after therapy, however complaining of persistent headache. Will give  Reglan.  Her tachycardia and fever have improved, however she has become borderline hypotensive a BP of 91/53.  Will give additional L of LR and reassess.  Labs are notable for mild leukocytosis normal lactic acid and nitrite negative UTI.    Patient remains hypotensive after 500 cc fluids.  She has still only received 1500 out of her 2400 mL however I am concerned about her low BP. Will transfer patient to main ED bed for closer monitoring and continue to administer 30 cc/kilos bolus IV fluids.    Pressure improved to 94/55 with a map of 69.  I anticipate admission to Hospital Medicine unless BP drops further requiring pressors.  I discussed this patient with my supervising physician and I am signing out to Kat Brown PA-C.    5:49 PM  Irene Betancourt PA-C                          Clinical Impression:       ICD-10-CM ICD-9-CM   1. Sepsis, due to unspecified organism A41.9 038.9     995.91   2. Pyelonephritis N12 590.80                                Irene Betancourt PA-C  08/30/19 [id number]

## 2019-08-30 NOTE — TELEPHONE ENCOUNTER
----- Message from Shirley Granados sent at 8/30/2019  9:24 AM CDT -----  Contact: self  Type:  Same Day Appointment Request    Patient need to speak with nurse.   Patient has appointment scheduled for 9/3/2019.   Patient states experiencing  problems with kidneys need appointment for today   Please call patient for more detail info   Name of Caller:   Patient   When is the first available appointment?  Symptoms  Best Call Back Number:[phone]  Additional Information:

## 2019-08-31 PROBLEM — A41.9 SEVERE SEPSIS: Status: ACTIVE | Noted: 2019-08-31

## 2019-08-31 PROBLEM — R65.20 SEVERE SEPSIS: Status: ACTIVE | Noted: 2019-08-31

## 2019-08-31 LAB
ALBUMIN SERPL BCP-MCNC: 3.1 G/DL (ref 3.5–5.2)
ALP SERPL-CCNC: 87 U/L (ref 55–135)
ALT SERPL W/O P-5'-P-CCNC: 18 U/L (ref 10–44)
ANION GAP SERPL CALC-SCNC: 7 MMOL/L (ref 8–16)
AST SERPL-CCNC: 18 U/L (ref 10–40)
BASOPHILS # BLD AUTO: 0.02 K/UL (ref 0–0.2)
BASOPHILS NFR BLD: 0.2 % (ref 0–1.9)
BILIRUB SERPL-MCNC: 0.4 MG/DL (ref 0.1–1)
BUN SERPL-MCNC: 8 MG/DL (ref 6–20)
CALCIUM SERPL-MCNC: 8.6 MG/DL (ref 8.7–10.5)
CHLORIDE SERPL-SCNC: 110 MMOL/L (ref 95–110)
CO2 SERPL-SCNC: 26 MMOL/L (ref 23–29)
CREAT SERPL-MCNC: 0.9 MG/DL (ref 0.5–1.4)
DIFFERENTIAL METHOD: ABNORMAL
EOSINOPHIL # BLD AUTO: 0 K/UL (ref 0–0.5)
EOSINOPHIL NFR BLD: 0.3 % (ref 0–8)
ERYTHROCYTE [DISTWIDTH] IN BLOOD BY AUTOMATED COUNT: 13.3 % (ref 11.5–14.5)
EST. GFR  (AFRICAN AMERICAN): >60 ML/MIN/1.73 M^2
EST. GFR  (NON AFRICAN AMERICAN): >60 ML/MIN/1.73 M^2
GLUCOSE SERPL-MCNC: 110 MG/DL (ref 70–110)
GRAM STN SPEC: NORMAL
GRAM STN SPEC: NORMAL
HCT VFR BLD AUTO: 35.4 % (ref 37–48.5)
HGB BLD-MCNC: 11.2 G/DL (ref 12–16)
IMM GRANULOCYTES # BLD AUTO: 0.06 K/UL (ref 0–0.04)
IMM GRANULOCYTES NFR BLD AUTO: 0.5 % (ref 0–0.5)
LYMPHOCYTES # BLD AUTO: 1.1 K/UL (ref 1–4.8)
LYMPHOCYTES NFR BLD: 10.1 % (ref 18–48)
MAGNESIUM SERPL-MCNC: 1.9 MG/DL (ref 1.6–2.6)
MCH RBC QN AUTO: 30.8 PG (ref 27–31)
MCHC RBC AUTO-ENTMCNC: 31.6 G/DL (ref 32–36)
MCV RBC AUTO: 97 FL (ref 82–98)
MONOCYTES # BLD AUTO: 1.1 K/UL (ref 0.3–1)
MONOCYTES NFR BLD: 9.3 % (ref 4–15)
NEUTROPHILS # BLD AUTO: 8.9 K/UL (ref 1.8–7.7)
NEUTROPHILS NFR BLD: 79.6 % (ref 38–73)
NRBC BLD-RTO: 0 /100 WBC
PLATELET # BLD AUTO: 169 K/UL (ref 150–350)
PMV BLD AUTO: 10.9 FL (ref 9.2–12.9)
POTASSIUM SERPL-SCNC: 4.9 MMOL/L (ref 3.5–5.1)
PROT SERPL-MCNC: 6.3 G/DL (ref 6–8.4)
RBC # BLD AUTO: 3.64 M/UL (ref 4–5.4)
SODIUM SERPL-SCNC: 143 MMOL/L (ref 136–145)
WBC # BLD AUTO: 11.23 K/UL (ref 3.9–12.7)

## 2019-08-31 PROCEDURE — 25000003 PHARM REV CODE 250: Performed by: STUDENT IN AN ORGANIZED HEALTH CARE EDUCATION/TRAINING PROGRAM

## 2019-08-31 PROCEDURE — 63600175 PHARM REV CODE 636 W HCPCS: Performed by: STUDENT IN AN ORGANIZED HEALTH CARE EDUCATION/TRAINING PROGRAM

## 2019-08-31 PROCEDURE — G0378 HOSPITAL OBSERVATION PER HR: HCPCS

## 2019-08-31 PROCEDURE — 85025 COMPLETE CBC W/AUTO DIFF WBC: CPT

## 2019-08-31 PROCEDURE — 83735 ASSAY OF MAGNESIUM: CPT

## 2019-08-31 PROCEDURE — 99255 IP/OBS CONSLTJ NEW/EST HI 80: CPT | Mod: ,,, | Performed by: INTERNAL MEDICINE

## 2019-08-31 PROCEDURE — 80053 COMPREHEN METABOLIC PANEL: CPT

## 2019-08-31 PROCEDURE — 36415 COLL VENOUS BLD VENIPUNCTURE: CPT

## 2019-08-31 PROCEDURE — 99255 PR INITIAL INPATIENT CONSULT,LEVL V: ICD-10-PCS | Mod: ,,, | Performed by: INTERNAL MEDICINE

## 2019-08-31 PROCEDURE — 20600001 HC STEP DOWN PRIVATE ROOM

## 2019-08-31 PROCEDURE — 87040 BLOOD CULTURE FOR BACTERIA: CPT

## 2019-08-31 PROCEDURE — 87205 SMEAR GRAM STAIN: CPT

## 2019-08-31 RX ORDER — SENNOSIDES 8.6 MG/1
8.6 TABLET ORAL DAILY PRN
Status: DISCONTINUED | OUTPATIENT
Start: 2019-08-31 | End: 2019-09-02 | Stop reason: HOSPADM

## 2019-08-31 RX ORDER — ACETAMINOPHEN 325 MG/1
650 TABLET ORAL EVERY 8 HOURS PRN
Status: DISCONTINUED | OUTPATIENT
Start: 2019-08-31 | End: 2019-09-02 | Stop reason: HOSPADM

## 2019-08-31 RX ORDER — BUTALBITAL, ACETAMINOPHEN AND CAFFEINE 50; 325; 40 MG/1; MG/1; MG/1
1 TABLET ORAL ONCE
Status: COMPLETED | OUTPATIENT
Start: 2019-08-31 | End: 2019-08-31

## 2019-08-31 RX ORDER — POLYETHYLENE GLYCOL 3350 17 G/17G
17 POWDER, FOR SOLUTION ORAL DAILY
Status: DISCONTINUED | OUTPATIENT
Start: 2019-08-31 | End: 2019-09-02 | Stop reason: HOSPADM

## 2019-08-31 RX ADMIN — ONDANSETRON 8 MG: 8 TABLET, ORALLY DISINTEGRATING ORAL at 08:08

## 2019-08-31 RX ADMIN — IBUPROFEN 400 MG: 200 TABLET, FILM COATED ORAL at 02:08

## 2019-08-31 RX ADMIN — ENOXAPARIN SODIUM 40 MG: 100 INJECTION SUBCUTANEOUS at 05:08

## 2019-08-31 RX ADMIN — OXYCODONE HYDROCHLORIDE 10 MG: 10 TABLET ORAL at 12:08

## 2019-08-31 RX ADMIN — DULOXETINE 60 MG: 60 CAPSULE, DELAYED RELEASE ORAL at 10:08

## 2019-08-31 RX ADMIN — PANTOPRAZOLE SODIUM 40 MG: 40 TABLET, DELAYED RELEASE ORAL at 10:08

## 2019-08-31 RX ADMIN — OXYCODONE HYDROCHLORIDE 10 MG: 10 TABLET ORAL at 06:08

## 2019-08-31 RX ADMIN — SODIUM CHLORIDE: 0.9 INJECTION, SOLUTION INTRAVENOUS at 01:08

## 2019-08-31 RX ADMIN — IBUPROFEN 400 MG: 200 TABLET, FILM COATED ORAL at 10:08

## 2019-08-31 RX ADMIN — OXYCODONE HYDROCHLORIDE 10 MG: 10 TABLET ORAL at 11:08

## 2019-08-31 RX ADMIN — ONDANSETRON 8 MG: 8 TABLET, ORALLY DISINTEGRATING ORAL at 11:08

## 2019-08-31 RX ADMIN — OXYCODONE HYDROCHLORIDE 5 MG: 5 TABLET ORAL at 06:08

## 2019-08-31 RX ADMIN — POLYETHYLENE GLYCOL 3350 17 G: 17 POWDER, FOR SOLUTION ORAL at 02:08

## 2019-08-31 RX ADMIN — ACETAMINOPHEN 650 MG: 325 TABLET ORAL at 06:08

## 2019-08-31 RX ADMIN — OXYCODONE HYDROCHLORIDE 10 MG: 10 TABLET ORAL at 05:08

## 2019-08-31 RX ADMIN — BUTALBITAL, ACETAMINOPHEN AND CAFFEINE 1 TABLET: 50; 325; 40 TABLET ORAL at 08:08

## 2019-08-31 RX ADMIN — GENTAMICIN SULFATE 311.6 MG: 40 INJECTION, SOLUTION INTRAMUSCULAR; INTRAVENOUS at 01:08

## 2019-08-31 RX ADMIN — CEFTRIAXONE 2 G: 2 INJECTION, SOLUTION INTRAVENOUS at 03:08

## 2019-08-31 RX ADMIN — RAMELTEON 8 MG: 8 TABLET ORAL at 01:08

## 2019-08-31 NOTE — ED NOTES
LOC: Pt is awake, alert, oriented x4. Affect is appropriate. Speech clear and appropriate.      Appearance: Pt resting comfortably in no acute distress. Pt shaking. Pt clean and well groomed.     Skin: Skin warm and dry. Color consistent with ethnicity. Skin turgor normal. Mucus membranes moist. Skin intact. No breakdown or bruising noted.     Musculoskeletal: Pt moving upper and lower extremities without difficulty. Denies weakness.     Respiratory: Airway open and patent. Respirations spontaneous, even, easy, and unlabored. Pt has normal effort and rate. No accessory muscle use noted. Denies cough. Denies shortness of breath.     Cardiovascular: No peripheral edema noted. No complaints of chest pain. S1S2 present. Rate regular. Radial pulses 2+.     Abdominal: Abdomen soft and nontender. No distention noted. Denies n/v. Bowels sounds active x 4 quadrants.     Neurological: Eyes open spontaneously. Behavior appropriate to situation. Follows commands appropriately. Facial expression symmetrical. Purposeful motor response. Sensation intact.     Pt complaining of back pain and headache at this time. Reports blood in urine and painful urination.

## 2019-08-31 NOTE — ASSESSMENT & PLAN NOTE
Pt admitted with urosepsis. CT c/w pyelonephritis vs possible passed kidney stone.  Pt with history of recurrent UTIs over the last year. Has seen urology as outpatient.     Blood cultures showing GNR. Urine culture with presumptive E. Coli.     Plan:  1. Continue rocephin 2 gram IV q 24 hours  2. Repeat blood cultures x 2   3. F/u on susceptibilities of blood cultures and tailor abx accordingly; if fluoroquinolone susceptible, may be able to send out on oral antibiotics to complete 14 day course  4. Will need work up for recurrent UTIs as outpatient; would refer to uro-gynecology  5. Will follow

## 2019-08-31 NOTE — ASSESSMENT & PLAN NOTE
54 year old female who presents with pyelonephritis. Febrile, Elvated white count, UA consitent with nitrite negative UTI. CT with perinephric stranding and ureter inflammation. Infection could be secondary to passed stone.     DDX: UTI, pyelonephritis, urosepsis, nephrolithiasis     - dose of ceftriaxone given in ED. Will continue ceftriaxone 2g q 24hr  - Nitrite negative UA. Will give dose of gentamycin, as well  - mIVFs  - norco PRN for pain  - zofran PRN for n/v  - blood and urine cultures pending

## 2019-08-31 NOTE — HPI
"54 year old female with PMH of plasmacytoma of L3 s/p radiation thearpy who presents due to pyelonphritis. Patient reports on 8/27 she developed hematuria, dyrusia, increased urinary frequency,, suprapubic pain, and nausea. Her hematuria resolved the following day; however, he other symptoms continued and the pain spread to bilateral flanks (right worse than left). Wednesday she reports chills and Thursday had a T-max of a 104° F with myalgias and chills. She has been taking tylenol for her fever. In the ED, she was febrile, hypotensive at 90s/50s, labs significant for WBC of 14, UA with 3+ leukocytes, many bacteria, and >100 WBCs, nitrite negative. urine and blood cultures pending. CXR unremarkable. CT renal stone study shows "mild nonspecific right-sided perinephric and periureteral inflammatory stranding, which could correlate with symptomatology, either from a recently passed stone no longer seen within the urinary tract or right-sided pyeloureteronephritis in the proper clinical setting.  No radiodense nephrolithiasis or hydronephrosis". She was then given 2L LR with improvement of BP, dose of ceftriaxone, and Toradol/regalan for HA, and admitted to the hospital for further evaluation .     Currently, she reports her HA is improved but still with significant right flank pain. No nausea of vomiting. She denies cough, chest pain, SOB. She has had 2 loose stools since Thursday but no associated abd cramping.   "

## 2019-08-31 NOTE — PHARMACY MED REC
"Admission Medication Reconciliation - Pharmacy Consult Note    The home medication history was taken by Sravani Cain, Pharmacy Technician. Based on information gathered and subsequent review by the clinical pharmacist, the items below may need attention.    You may go to "Admission" then "Reconcile Home Medications" tabs to review and/or act upon these items.    No issues noted with the medication reconciliation.    Please address this information as you see fit.  Feel free to contact us if you have any questions or require assistance.    Myesha Sherwood, PharmD, BCPS  [id number]    PTA Medications   Medication Sig    conj estrogens-bazedoxifene (DUAVEE) 0.45-20 mg Tab Take 1 tablet by mouth once daily.     DULoxetine (CYMBALTA) 60 MG capsule Take 60 mg by mouth once daily.      .    .          "

## 2019-08-31 NOTE — HPI
"54 year old female with PMH of plasmacytoma of L3 s/p radiation therapy who was admitted with pyelonephritis.  Patient reports on 8/27 she developed hematuria, dyrsuria, urinary frequency, suprapubic pain, and nausea. Her hematuria resolved the following day; however, her other symptoms continued with worsening flank pain. She began to have fevers (104) and chills.      In the ED, she was febrile, hypotensive,, with leukocytosis of 14, UA had pyruria, nitrite negative. CXR unremarkable. CT renal stone study shows "mild nonspecific right-sided perinephric and periureteral inflammatory stranding, which could correlate with symptomatology, either from a recently passed stone no longer seen within the urinary tract or right-sided pyeloureteronephritis in the proper clinical setting.  No radiodense nephrolithiasis or hydronephrosis". She was then given 2L LR with improvement of BP, dose of ceftriaxone, and Toradol/regalan for HA, and admitted to the hospital for further evaluation .     Blood cultures now showing GNR. Her urine culture shows presumptive E. Coli. Pt is improving on IV rocephin. ID consulted for recs.   Has some nausea with one episode of vomiting this am. Still with SP pain.   "

## 2019-08-31 NOTE — SUBJECTIVE & OBJECTIVE
Past Medical History:   Diagnosis Date    Cancer     Plasmacytoma     Rotator cuff disorder     Spinal stenosis        Past Surgical History:   Procedure Laterality Date     SECTION      corpal tunnel      JOINT REPLACEMENT      marquisear mojgan      ROTATOR CUFF REPAIR      TONSILLECTOMY      TOTAL HIP ARTHROPLASTY         Review of patient's allergies indicates:  No Known Allergies    Medications:  Medications Prior to Admission   Medication Sig    conj estrogens-bazedoxifene (DUAVEE) 0.45-20 mg Tab     DULoxetine (CYMBALTA) 60 MG capsule Take 60 mg by mouth once daily.     omeprazole (PRILOSEC) 40 MG capsule Take 40 mg by mouth once daily.    sucralfate (CARAFATE) 1 gram tablet TAKE ONE TABLET BY MOUTH THREE TIMES DAILY BEFORE MEALS AND AT BEDTIME     Antibiotics (From admission, onward)    Start     Stop Route Frequency Ordered    19 1500  cefTRIAXone (ROCEPHIN) 2 g in dextrose 5 % 50 mL IVPB      -- IV Every 24 hours (non-standard times) 19 0107        Antifungals (From admission, onward)    None        Antivirals (From admission, onward)    None           Immunization History   Administered Date(s) Administered    Influenza 2018    Td - PF (ADULT) 10/01/2005       Family History     Problem Relation (Age of Onset)    Heart disease Mother, Father        Social History     Socioeconomic History    Marital status:      Spouse name: Not on file    Number of children: Not on file    Years of education: Not on file    Highest education level: Not on file   Occupational History    Not on file   Social Needs    Financial resource strain: Not on file    Food insecurity:     Worry: Not on file     Inability: Not on file    Transportation needs:     Medical: Not on file     Non-medical: Not on file   Tobacco Use    Smoking status: Never Smoker    Smokeless tobacco: Never Used   Substance and Sexual Activity    Alcohol use: Not on file     Comment: occassionally     Drug use: No    Sexual activity: Yes     Partners: Male   Lifestyle    Physical activity:     Days per week: Not on file     Minutes per session: Not on file    Stress: Not on file   Relationships    Social connections:     Talks on phone: Not on file     Gets together: Not on file     Attends Sikh service: Not on file     Active member of club or organization: Not on file     Attends meetings of clubs or organizations: Not on file     Relationship status: Not on file   Other Topics Concern    Not on file   Social History Narrative    Not on file     Review of Systems   Constitutional: Positive for chills, diaphoresis and fever.   Respiratory: Negative for cough and shortness of breath.    Cardiovascular: Negative for chest pain and leg swelling.   Gastrointestinal: Positive for abdominal distention, abdominal pain, nausea and vomiting. Negative for constipation and diarrhea.   Genitourinary: Negative for dysuria, frequency and hematuria.   Musculoskeletal: Negative for back pain and myalgias.   Skin: Negative for rash and wound.   Neurological: Positive for weakness and headaches. Negative for dizziness and light-headedness.   Psychiatric/Behavioral: Negative for agitation and behavioral problems. The patient is not nervous/anxious.      Objective:     Vital Signs (Most Recent):  Temp: 99.1 °F (37.3 °C) (08/31/19 1116)  Pulse: 68 (08/31/19 1127)  Resp: 20 (08/31/19 1127)  BP: 120/76 (08/31/19 1127)  SpO2: (!) 92 % (08/31/19 1127) Vital Signs (24h Range):  Temp:  [96.6 °F (35.9 °C)-102.4 °F (39.1 °C)] 99.1 °F (37.3 °C)  Pulse:  [] 68  Resp:  [12-25] 20  SpO2:  [92 %-100 %] 92 %  BP: ()/(51-79) 120/76     Weight: 84.3 kg (185 lb 13.6 oz)  Body mass index is 33.99 kg/m².    Estimated Creatinine Clearance: 70.3 mL/min (based on SCr of 0.9 mg/dL).    Physical Exam   Constitutional: She is oriented to person, place, and time. She appears well-developed and well-nourished. No distress.    Cardiovascular: Normal rate and regular rhythm.   No murmur heard.  Pulmonary/Chest: Effort normal and breath sounds normal. No respiratory distress.   Abdominal: Soft. Bowel sounds are normal. She exhibits no distension. There is tenderness in the suprapubic area. There is no CVA tenderness.   Musculoskeletal: Normal range of motion. She exhibits no edema.   Neurological: She is alert and oriented to person, place, and time.   Skin: Skin is warm and dry.   Psychiatric: She has a normal mood and affect. Her behavior is normal.       Significant Labs:   Blood Culture:   Recent Labs   Lab 08/30/19  1505 08/30/19  1729   LABBLOO Gram stain aer bottle: Gram negative rods   Results called to and read back by: Avi Smyth RN 08/31/2019  05:15 No Growth to date     CBC:   Recent Labs   Lab 08/30/19  1508 08/31/19  0637   WBC 14.27* 11.23   HGB 12.7 11.2*   HCT 38.3 35.4*    169     CMP:   Recent Labs   Lab 08/30/19  1508 08/31/19  0637    143   K 3.7 4.9    110   CO2 24 26   * 110   BUN 12 8   CREATININE 1.0 0.9   CALCIUM 9.7 8.6*   PROT  --  6.3   ALBUMIN  --  3.1*   BILITOT  --  0.4   ALKPHOS  --  87   AST  --  18   ALT  --  18   ANIONGAP 10 7*   EGFRNONAA >60.0 >60.0     Urine Culture:   Recent Labs   Lab 08/30/19  1508   LABURIN PRESUMPTIVE E COLI  >100,000 cfu/ml  Identification and susceptibility pending  *     Urine Studies:   Recent Labs   Lab 08/30/19  1508   COLORU Yellow   APPEARANCEUA Cloudy*   PHUR 7.0   SPECGRAV 1.015   PROTEINUA 2+*   GLUCUA Negative   KETONESU 1+*   BILIRUBINUA Negative   OCCULTUA 1+*   NITRITE Negative   LEUKOCYTESUR 3+*   RBCUA 19*   WBCUA >100*   BACTERIA Many*   SQUAMEPITHEL 1   HYALINECASTS 0       Significant Imaging: I have reviewed all pertinent imaging results/findings within the past 24 hours.

## 2019-08-31 NOTE — ED NOTES
Telemetry Verification   Patient placed on Telemetry Box  Verified with War Room  Box # [id number]   Monitor Tech patric   Rate 102   Rhythm ST

## 2019-08-31 NOTE — NURSING
Pt arrived to unit after 2H of being told pt would be sent up. This RN and DIEGO Mike RN, went to 11th floor and transferred pt to TSU ourselves. VSS, Temp 101.3. Visi in place. Tylenol to be administered per pt request. Mag scheduled however order has not yet been reviewed therefore will not administer at this time. ZURITM.

## 2019-08-31 NOTE — H&P
"[facility] Hospital Medicine  History & Physical    Patient Name: Mary Blair  MRN: [id number]  Admission Date: 8/30/2019  Attending Physician: Nicolle Sanchez MD   Primary Care Provider: Aldo Menard MD    [facility] Medicine Team: [facility] H[facility] MED 2 Rishabh Rivera MD     Patient information was obtained from patient and ER records.     Subjective:     Principal Problem:Pyelonephritis    Chief Complaint:   Chief Complaint   Patient presents with    Female  Problem     fevers, back pain, nausea,urinating often had blood in it        HPI: 54 year old female with PMH of plasmacytoma of L3 s/p radiation thearpy who presents due to pyelonphritis. Patient reports on 8/27 she developed hematuria, dyrusia, increased urinary frequency,, suprapubic pain, and nausea. Her hematuria resolved the following day; however, he other symptoms continued and the pain spread to bilateral flanks (right worse than left). Wednesday she reports chills and Thursday had a T-max of a 104° F with myalgias and chills. She has been taking tylenol for her fever. In the ED, she was febrile, hypotensive at 90s/50s, labs significant for WBC of 14, UA with 3+ leukocytes, many bacteria, and >100 WBCs, nitrite negative. urine and blood cultures pending. CXR unremarkable. CT renal stone study shows "mild nonspecific right-sided perinephric and periureteral inflammatory stranding, which could correlate with symptomatology, either from a recently passed stone no longer seen within the urinary tract or right-sided pyeloureteronephritis in the proper clinical setting.  No radiodense nephrolithiasis or hydronephrosis". She was then given 2L LR with improvement of BP, dose of ceftriaxone, and Toradol/regalan for HA, and admitted to the hospital for further evaluation .     Currently, she reports her HA is improved but still with significant right flank pain. No nausea of vomiting. She denies cough, chest pain, SOB. She has had 2 " loose stools since Thursday but no associated abd cramping.     Past Medical History:   Diagnosis Date    Cancer     Plasmacytoma     Rotator cuff disorder     Spinal stenosis        Past Surgical History:   Procedure Laterality Date     SECTION      corpal tunnel      JOINT REPLACEMENT      lubar sugery      ROTATOR CUFF REPAIR      TONSILLECTOMY      TOTAL HIP ARTHROPLASTY         Review of patient's allergies indicates:  No Known Allergies    No current facility-administered medications on file prior to encounter.      Current Outpatient Medications on File Prior to Encounter   Medication Sig    conj estrogens-bazedoxifene (DUAVEE) 0.45-20 mg Tab     DULoxetine (CYMBALTA) 60 MG capsule Take 60 mg by mouth once daily.     omeprazole (PRILOSEC) 40 MG capsule Take 40 mg by mouth once daily.    sucralfate (CARAFATE) 1 gram tablet TAKE ONE TABLET BY MOUTH THREE TIMES DAILY BEFORE MEALS AND AT BEDTIME     Family History     Problem Relation (Age of Onset)    Heart disease Mother, Father        Tobacco Use    Smoking status: Never Smoker    Smokeless tobacco: Never Used   Substance and Sexual Activity    Alcohol use: Not on file     Comment: occassionally    Drug use: No    Sexual activity: Yes     Partners: Male     Review of Systems   Constitutional: Positive for chills, diaphoresis and fever.   HENT: Negative for congestion and rhinorrhea.    Eyes: Negative for visual disturbance.   Respiratory: Negative for cough and shortness of breath.    Cardiovascular: Negative for chest pain and leg swelling.   Gastrointestinal: Positive for abdominal pain, diarrhea and nausea. Negative for vomiting.   Genitourinary: Positive for dysuria, flank pain, frequency and hematuria. Negative for vaginal bleeding and vaginal discharge.   Musculoskeletal: Negative for back pain.   Skin: Negative for rash.   Neurological: Negative for syncope and weakness.   Psychiatric/Behavioral: Negative for agitation and  confusion.     Objective:     Vital Signs (Most Recent):  Temp: 99.5 °F (37.5 °C) (08/30/19 2330)  Pulse: 90 (08/30/19 2330)  Resp: (!) 22 (08/30/19 2330)  BP: 106/71 (08/30/19 2330)  SpO2: (!) 93 % (08/30/19 2330) Vital Signs (24h Range):  Temp:  [96.6 °F (35.9 °C)-102.4 °F (39.1 °C)] 99.5 °F (37.5 °C)  Pulse:  [] 90  Resp:  [12-25] 22  SpO2:  [93 %-100 %] 93 %  BP: ()/(51-77) 106/71     Weight: 80.7 kg (178 lb)  Body mass index is 32.56 kg/m².    Physical Exam   Constitutional: She is oriented to person, place, and time. She appears well-developed and well-nourished.   HENT:   Head: Normocephalic and atraumatic.   Eyes: Conjunctivae and EOM are normal.   Neck: Normal range of motion. Neck supple.   Cardiovascular: Normal rate, regular rhythm, normal heart sounds and intact distal pulses.   Pulmonary/Chest: Effort normal and breath sounds normal. No respiratory distress.   Abdominal: Soft. She exhibits no distension and no mass. There is tenderness (TTP over suprapubic area). There is no guarding.   Genitourinary:   Genitourinary Comments: + bilateral CVA tenderness (right > left)   Musculoskeletal: Normal range of motion. She exhibits no edema.   Neurological: She is alert and oriented to person, place, and time.   Skin: Skin is warm. Capillary refill takes less than 2 seconds.   Psychiatric: She has a normal mood and affect. Thought content normal.         CRANIAL NERVES     CN III, IV, VI   Extraocular motions are normal.        Significant Labs:   CBC:   Recent Labs   Lab 08/30/19  1508   WBC 14.27*   HGB 12.7   HCT 38.3        CMP:   Recent Labs   Lab 08/30/19  1508      K 3.7      CO2 24   *   BUN 12   CREATININE 1.0   CALCIUM 9.7   ANIONGAP 10   EGFRNONAA >60.0     Urine Studies:   Recent Labs   Lab 08/30/19  1508   COLORU Yellow   APPEARANCEUA Cloudy*   PHUR 7.0   SPECGRAV 1.015   PROTEINUA 2+*   GLUCUA Negative   KETONESU 1+*   BILIRUBINUA Negative   OCCULTUA 1+*    NITRITE Negative   LEUKOCYTESUR 3+*   RBCUA 19*   WBCUA >100*   BACTERIA Many*   SQUAMEPITHEL 1   HYALINECASTS 0       Significant Imaging: I have reviewed and interpreted all pertinent imaging results/findings within the past 24 hours.    Assessment/Plan:     * Pyelonephritis  54 year old female who presents with pyelonephritis. Febrile, Elvated white count, UA consitent with nitrite negative UTI. CT with perinephric stranding and ureter inflammation. Infection could be secondary to passed stone.     DDX: UTI, pyelonephritis, urosepsis, nephrolithiasis     - dose of ceftriaxone given in ED. Will continue ceftriaxone 2g q 24hr  - Nitrite negative UA. Will give dose of gentamycin, as well  - mIVFs  - norco PRN for pain  - zofran PRN for n/v  - blood and urine cultures pending     Plasmacytoma  - playmacytoma of L3 s/p radiation. Still followed by heme onc. 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

## 2019-08-31 NOTE — CONSULTS
"[facility]  Infectious Disease  Consult Note    Patient Name: Mary Blair  MRN: [id number]  Admission Date: 8/30/2019  Hospital Length of Stay: 1 days  Attending Physician: Nicolle Sanchez MD  Primary Care Provider: Aldo Menard MD     Isolation Status: No active isolations    Patient information was obtained from patient and past medical records.      Inpatient consult to Infectious Diseases  Consult performed by: JANENE Hong  Consult ordered by: Eulalio Baez DO        Assessment/Plan:     Pyelonephritis  Pt admitted with urosepsis. CT c/w pyelonephritis vs possible passed kidney stone.  Pt with history of recurrent UTIs over the last year. Has seen urology as outpatient.     Blood cultures showing GNR. Urine culture with presumptive E. Coli.     Plan:  1. Continue rocephin 2 gram IV q 24 hours  2. Repeat blood cultures x 2   3. F/u on susceptibilities of blood cultures and tailor abx accordingly; if fluoroquinolone susceptible, may be able to send out on oral antibiotics to complete 14 day course  4. Will need work up for recurrent UTIs as outpatient; would refer to uro-gynecology  5. Will follow      Thank you for your consult. I will follow-up with patient. Please contact us if you have any additional questions.  JANENE Redman Pager: [phone]  Infectious Disease  [facility]    Subjective:     Principal Problem: Sepsis    HPI: 54 year old female with PMH of plasmacytoma of L3 s/p radiation therapy who was admitted with pyelonephritis.  Patient reports on 8/27 she developed hematuria, dyrsuria, urinary frequency, suprapubic pain, and nausea. Her hematuria resolved the following day; however, her other symptoms continued with worsening flank pain. She began to have fevers (104) and chills.      In the ED, she was febrile, hypotensive,, with leukocytosis of 14, UA had pyruria, nitrite negative. CXR unremarkable. CT renal stone study shows "mild " "nonspecific right-sided perinephric and periureteral inflammatory stranding, which could correlate with symptomatology, either from a recently passed stone no longer seen within the urinary tract or right-sided pyeloureteronephritis in the proper clinical setting.  No radiodense nephrolithiasis or hydronephrosis". She was then given 2L LR with improvement of BP, dose of ceftriaxone, and Toradol/regalan for HA, and admitted to the hospital for further evaluation .     Blood cultures now showing GNR. Her urine culture shows presumptive E. Coli. Pt is improving on IV rocephin. ID consulted for recs.   Has some nausea with one episode of vomiting this am. Still with SP pain.     Past Medical History:   Diagnosis Date    Cancer     Plasmacytoma     Rotator cuff disorder     Spinal stenosis        Past Surgical History:   Procedure Laterality Date     SECTION      corpal tunnel      JOINT REPLACEMENT      lubar sugery      ROTATOR CUFF REPAIR      TONSILLECTOMY      TOTAL HIP ARTHROPLASTY         Review of patient's allergies indicates:  No Known Allergies    Medications:  Medications Prior to Admission   Medication Sig    conj estrogens-bazedoxifene (DUAVEE) 0.45-20 mg Tab     DULoxetine (CYMBALTA) 60 MG capsule Take 60 mg by mouth once daily.     omeprazole (PRILOSEC) 40 MG capsule Take 40 mg by mouth once daily.    sucralfate (CARAFATE) 1 gram tablet TAKE ONE TABLET BY MOUTH THREE TIMES DAILY BEFORE MEALS AND AT BEDTIME     Antibiotics (From admission, onward)    Start     Stop Route Frequency Ordered    19 1500  cefTRIAXone (ROCEPHIN) 2 g in dextrose 5 % 50 mL IVPB      -- IV Every 24 hours (non-standard times) 19 0107        Antifungals (From admission, onward)    None        Antivirals (From admission, onward)    None           Immunization History   Administered Date(s) Administered    Influenza 2018    Td - PF (ADULT) 10/01/2005       Family History     Problem Relation " (Age of Onset)    Heart disease Mother, Father        Social History     Socioeconomic History    Marital status:      Spouse name: Not on file    Number of children: Not on file    Years of education: Not on file    Highest education level: Not on file   Occupational History    Not on file   Social Needs    Financial resource strain: Not on file    Food insecurity:     Worry: Not on file     Inability: Not on file    Transportation needs:     Medical: Not on file     Non-medical: Not on file   Tobacco Use    Smoking status: Never Smoker    Smokeless tobacco: Never Used   Substance and Sexual Activity    Alcohol use: Not on file     Comment: occassionally    Drug use: No    Sexual activity: Yes     Partners: Male   Lifestyle    Physical activity:     Days per week: Not on file     Minutes per session: Not on file    Stress: Not on file   Relationships    Social connections:     Talks on phone: Not on file     Gets together: Not on file     Attends Islam service: Not on file     Active member of club or organization: Not on file     Attends meetings of clubs or organizations: Not on file     Relationship status: Not on file   Other Topics Concern    Not on file   Social History Narrative    Not on file     Review of Systems   Constitutional: Positive for chills, diaphoresis and fever.   Respiratory: Negative for cough and shortness of breath.    Cardiovascular: Negative for chest pain and leg swelling.   Gastrointestinal: Positive for abdominal distention, abdominal pain, nausea and vomiting. Negative for constipation and diarrhea.   Genitourinary: Negative for dysuria, frequency and hematuria.   Musculoskeletal: Negative for back pain and myalgias.   Skin: Negative for rash and wound.   Neurological: Positive for weakness and headaches. Negative for dizziness and light-headedness.   Psychiatric/Behavioral: Negative for agitation and behavioral problems. The patient is not  nervous/anxious.      Objective:     Vital Signs (Most Recent):  Temp: 99.1 °F (37.3 °C) (08/31/19 1116)  Pulse: 68 (08/31/19 1127)  Resp: 20 (08/31/19 1127)  BP: 120/76 (08/31/19 1127)  SpO2: (!) 92 % (08/31/19 1127) Vital Signs (24h Range):  Temp:  [96.6 °F (35.9 °C)-102.4 °F (39.1 °C)] 99.1 °F (37.3 °C)  Pulse:  [] 68  Resp:  [12-25] 20  SpO2:  [92 %-100 %] 92 %  BP: ()/(51-79) 120/76     Weight: 84.3 kg (185 lb 13.6 oz)  Body mass index is 33.99 kg/m².    Estimated Creatinine Clearance: 70.3 mL/min (based on SCr of 0.9 mg/dL).    Physical Exam   Constitutional: She is oriented to person, place, and time. She appears well-developed and well-nourished. No distress.   Cardiovascular: Normal rate and regular rhythm.   No murmur heard.  Pulmonary/Chest: Effort normal and breath sounds normal. No respiratory distress.   Abdominal: Soft. Bowel sounds are normal. She exhibits no distension. There is tenderness in the suprapubic area. There is no CVA tenderness.   Musculoskeletal: Normal range of motion. She exhibits no edema.   Neurological: She is alert and oriented to person, place, and time.   Skin: Skin is warm and dry.   Psychiatric: She has a normal mood and affect. Her behavior is normal.       Significant Labs:   Blood Culture:   Recent Labs   Lab 08/30/19  1505 08/30/19  1729   LABBLOO Gram stain aer bottle: Gram negative rods   Results called to and read back by: Avi Smyth RN 08/31/2019  05:15 No Growth to date     CBC:   Recent Labs   Lab 08/30/19  1508 08/31/19  0637   WBC 14.27* 11.23   HGB 12.7 11.2*   HCT 38.3 35.4*    169     CMP:   Recent Labs   Lab 08/30/19  1508 08/31/19  0637    143   K 3.7 4.9    110   CO2 24 26   * 110   BUN 12 8   CREATININE 1.0 0.9   CALCIUM 9.7 8.6*   PROT  --  6.3   ALBUMIN  --  3.1*   BILITOT  --  0.4   ALKPHOS  --  87   AST  --  18   ALT  --  18   ANIONGAP 10 7*   EGFRNONAA >60.0 >60.0     Urine Culture:   Recent Labs   Lab  08/30/19  1508   LABURIN PRESUMPTIVE E COLI  >100,000 cfu/ml  Identification and susceptibility pending  *     Urine Studies:   Recent Labs   Lab 08/30/19  1508   COLORU Yellow   APPEARANCEUA Cloudy*   PHUR 7.0   SPECGRAV 1.015   PROTEINUA 2+*   GLUCUA Negative   KETONESU 1+*   BILIRUBINUA Negative   OCCULTUA 1+*   NITRITE Negative   LEUKOCYTESUR 3+*   RBCUA 19*   WBCUA >100*   BACTERIA Many*   SQUAMEPITHEL 1   HYALINECASTS 0       Significant Imaging: I have reviewed all pertinent imaging results/findings within the past 24 hours.

## 2019-08-31 NOTE — PLAN OF CARE
Problem: Adult Inpatient Plan of Care  Goal: Plan of Care Review  Outcome: Ongoing (interventions implemented as appropriate)  Patient turned and repositioned self independently. No skin breakdown noted. Patient pain and safety monitored q 1-2 hrs this shift. Medicated with PRN oxycodone x1. Ambulates independently without difficulty. Bed locked and in lowest position. Bed side rails elevated x 2. Call light and personal belongings in reach. Continues on ABT, no a/e noted. Will cont. to monitor.   08/31/19 9643   Plan of Care Review   Plan of Care Reviewed With patient

## 2019-08-31 NOTE — SUBJECTIVE & OBJECTIVE
Past Medical History:   Diagnosis Date    Cancer     Plasmacytoma     Rotator cuff disorder     Spinal stenosis        Past Surgical History:   Procedure Laterality Date     SECTION      corpal tunnel      JOINT REPLACEMENT      lubar sugery      ROTATOR CUFF REPAIR      TONSILLECTOMY      TOTAL HIP ARTHROPLASTY         Review of patient's allergies indicates:  No Known Allergies    No current facility-administered medications on file prior to encounter.      Current Outpatient Medications on File Prior to Encounter   Medication Sig    conj estrogens-bazedoxifene (DUAVEE) 0.45-20 mg Tab     DULoxetine (CYMBALTA) 60 MG capsule Take 60 mg by mouth once daily.     omeprazole (PRILOSEC) 40 MG capsule Take 40 mg by mouth once daily.    sucralfate (CARAFATE) 1 gram tablet TAKE ONE TABLET BY MOUTH THREE TIMES DAILY BEFORE MEALS AND AT BEDTIME     Family History     Problem Relation (Age of Onset)    Heart disease Mother, Father        Tobacco Use    Smoking status: Never Smoker    Smokeless tobacco: Never Used   Substance and Sexual Activity    Alcohol use: Not on file     Comment: occassionally    Drug use: No    Sexual activity: Yes     Partners: Male     Review of Systems   Constitutional: Positive for chills, diaphoresis and fever.   HENT: Negative for congestion and rhinorrhea.    Eyes: Negative for visual disturbance.   Respiratory: Negative for cough and shortness of breath.    Cardiovascular: Negative for chest pain and leg swelling.   Gastrointestinal: Positive for abdominal pain, diarrhea and nausea. Negative for vomiting.   Genitourinary: Positive for dysuria, flank pain, frequency and hematuria. Negative for vaginal bleeding and vaginal discharge.   Musculoskeletal: Negative for back pain.   Skin: Negative for rash.   Neurological: Negative for syncope and weakness.   Psychiatric/Behavioral: Negative for agitation and confusion.     Objective:     Vital Signs (Most Recent):  Temp:  99.5 °F (37.5 °C) (08/30/19 2330)  Pulse: 90 (08/30/19 2330)  Resp: (!) 22 (08/30/19 2330)  BP: 106/71 (08/30/19 2330)  SpO2: (!) 93 % (08/30/19 2330) Vital Signs (24h Range):  Temp:  [96.6 °F (35.9 °C)-102.4 °F (39.1 °C)] 99.5 °F (37.5 °C)  Pulse:  [] 90  Resp:  [12-25] 22  SpO2:  [93 %-100 %] 93 %  BP: ()/(51-77) 106/71     Weight: 80.7 kg (178 lb)  Body mass index is 32.56 kg/m².    Physical Exam   Constitutional: She is oriented to person, place, and time. She appears well-developed and well-nourished.   HENT:   Head: Normocephalic and atraumatic.   Eyes: Conjunctivae and EOM are normal.   Neck: Normal range of motion. Neck supple.   Cardiovascular: Normal rate, regular rhythm, normal heart sounds and intact distal pulses.   Pulmonary/Chest: Effort normal and breath sounds normal. No respiratory distress.   Abdominal: Soft. She exhibits no distension and no mass. There is tenderness (TTP over suprapubic area). There is no guarding.   Genitourinary:   Genitourinary Comments: + bilateral CVA tenderness (right > left)   Musculoskeletal: Normal range of motion. She exhibits no edema.   Neurological: She is alert and oriented to person, place, and time.   Skin: Skin is warm. Capillary refill takes less than 2 seconds.   Psychiatric: She has a normal mood and affect. Thought content normal.         CRANIAL NERVES     CN III, IV, VI   Extraocular motions are normal.        Significant Labs:   CBC:   Recent Labs   Lab 08/30/19  1508   WBC 14.27*   HGB 12.7   HCT 38.3        CMP:   Recent Labs   Lab 08/30/19  1508      K 3.7      CO2 24   *   BUN 12   CREATININE 1.0   CALCIUM 9.7   ANIONGAP 10   EGFRNONAA >60.0     Urine Studies:   Recent Labs   Lab 08/30/19  1508   COLORU Yellow   APPEARANCEUA Cloudy*   PHUR 7.0   SPECGRAV 1.015   PROTEINUA 2+*   GLUCUA Negative   KETONESU 1+*   BILIRUBINUA Negative   OCCULTUA 1+*   NITRITE Negative   LEUKOCYTESUR 3+*   RBCUA 19*   WBCUA >100*    BACTERIA Many*   SQUAMEPITHEL 1   HYALINECASTS 0       Significant Imaging: I have reviewed and interpreted all pertinent imaging results/findings within the past 24 hours.

## 2019-08-31 NOTE — ASSESSMENT & PLAN NOTE
- playmacytoma of L3 s/p radiation. Still followed by heme onc. 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

## 2019-09-01 PROBLEM — E87.6 HYPOKALEMIA: Status: ACTIVE | Noted: 2019-09-01

## 2019-09-01 PROBLEM — G44.219 EPISODIC TENSION-TYPE HEADACHE, NOT INTRACTABLE: Status: ACTIVE | Noted: 2019-09-01

## 2019-09-01 PROBLEM — E83.42 HYPOMAGNESEMIA: Status: ACTIVE | Noted: 2019-09-01

## 2019-09-01 LAB
ALBUMIN SERPL BCP-MCNC: 2.9 G/DL (ref 3.5–5.2)
ALP SERPL-CCNC: 77 U/L (ref 55–135)
ALT SERPL W/O P-5'-P-CCNC: 15 U/L (ref 10–44)
ANION GAP SERPL CALC-SCNC: 9 MMOL/L (ref 8–16)
AST SERPL-CCNC: 13 U/L (ref 10–40)
BACTERIA UR CULT: ABNORMAL
BASOPHILS # BLD AUTO: 0.02 K/UL (ref 0–0.2)
BASOPHILS NFR BLD: 0.3 % (ref 0–1.9)
BILIRUB SERPL-MCNC: 0.3 MG/DL (ref 0.1–1)
BUN SERPL-MCNC: 6 MG/DL (ref 6–20)
CALCIUM SERPL-MCNC: 8.9 MG/DL (ref 8.7–10.5)
CHLORIDE SERPL-SCNC: 105 MMOL/L (ref 95–110)
CO2 SERPL-SCNC: 25 MMOL/L (ref 23–29)
CREAT SERPL-MCNC: 0.8 MG/DL (ref 0.5–1.4)
DIFFERENTIAL METHOD: ABNORMAL
EOSINOPHIL # BLD AUTO: 0 K/UL (ref 0–0.5)
EOSINOPHIL NFR BLD: 0.5 % (ref 0–8)
ERYTHROCYTE [DISTWIDTH] IN BLOOD BY AUTOMATED COUNT: 13.1 % (ref 11.5–14.5)
EST. GFR  (AFRICAN AMERICAN): >60 ML/MIN/1.73 M^2
EST. GFR  (NON AFRICAN AMERICAN): >60 ML/MIN/1.73 M^2
GLUCOSE SERPL-MCNC: 114 MG/DL (ref 70–110)
HCT VFR BLD AUTO: 33.5 % (ref 37–48.5)
HGB BLD-MCNC: 10.7 G/DL (ref 12–16)
IMM GRANULOCYTES # BLD AUTO: 0.03 K/UL (ref 0–0.04)
IMM GRANULOCYTES NFR BLD AUTO: 0.4 % (ref 0–0.5)
LYMPHOCYTES # BLD AUTO: 0.5 K/UL (ref 1–4.8)
LYMPHOCYTES NFR BLD: 6.6 % (ref 18–48)
MAGNESIUM SERPL-MCNC: 2.1 MG/DL (ref 1.6–2.6)
MCH RBC QN AUTO: 30.2 PG (ref 27–31)
MCHC RBC AUTO-ENTMCNC: 31.9 G/DL (ref 32–36)
MCV RBC AUTO: 95 FL (ref 82–98)
MONOCYTES # BLD AUTO: 0.9 K/UL (ref 0.3–1)
MONOCYTES NFR BLD: 11.5 % (ref 4–15)
NEUTROPHILS # BLD AUTO: 6.3 K/UL (ref 1.8–7.7)
NEUTROPHILS NFR BLD: 80.7 % (ref 38–73)
NRBC BLD-RTO: 0 /100 WBC
PLATELET # BLD AUTO: 161 K/UL (ref 150–350)
PMV BLD AUTO: 10.6 FL (ref 9.2–12.9)
POCT GLUCOSE: 108 MG/DL (ref 70–110)
POTASSIUM SERPL-SCNC: 3.6 MMOL/L (ref 3.5–5.1)
PROT SERPL-MCNC: 6.3 G/DL (ref 6–8.4)
RBC # BLD AUTO: 3.54 M/UL (ref 4–5.4)
SODIUM SERPL-SCNC: 139 MMOL/L (ref 136–145)
WBC # BLD AUTO: 7.74 K/UL (ref 3.9–12.7)

## 2019-09-01 PROCEDURE — 25000003 PHARM REV CODE 250: Performed by: STUDENT IN AN ORGANIZED HEALTH CARE EDUCATION/TRAINING PROGRAM

## 2019-09-01 PROCEDURE — 99233 PR SUBSEQUENT HOSPITAL CARE,LEVL III: ICD-10-PCS | Mod: ,,, | Performed by: PHYSICIAN ASSISTANT

## 2019-09-01 PROCEDURE — 93005 ELECTROCARDIOGRAM TRACING: CPT

## 2019-09-01 PROCEDURE — 99233 SBSQ HOSP IP/OBS HIGH 50: CPT | Mod: ,,, | Performed by: PHYSICIAN ASSISTANT

## 2019-09-01 PROCEDURE — 99232 PR SUBSEQUENT HOSPITAL CARE,LEVL II: ICD-10-PCS | Mod: ,,, | Performed by: INTERNAL MEDICINE

## 2019-09-01 PROCEDURE — 99232 SBSQ HOSP IP/OBS MODERATE 35: CPT | Mod: ,,, | Performed by: INTERNAL MEDICINE

## 2019-09-01 PROCEDURE — 63600175 PHARM REV CODE 636 W HCPCS: Performed by: STUDENT IN AN ORGANIZED HEALTH CARE EDUCATION/TRAINING PROGRAM

## 2019-09-01 PROCEDURE — 93010 ELECTROCARDIOGRAM REPORT: CPT | Mod: ,,, | Performed by: INTERNAL MEDICINE

## 2019-09-01 PROCEDURE — G0378 HOSPITAL OBSERVATION PER HR: HCPCS

## 2019-09-01 PROCEDURE — 20600001 HC STEP DOWN PRIVATE ROOM

## 2019-09-01 PROCEDURE — 93010 EKG 12-LEAD: ICD-10-PCS | Mod: ,,, | Performed by: INTERNAL MEDICINE

## 2019-09-01 PROCEDURE — 83735 ASSAY OF MAGNESIUM: CPT

## 2019-09-01 PROCEDURE — 85025 COMPLETE CBC W/AUTO DIFF WBC: CPT

## 2019-09-01 PROCEDURE — 36415 COLL VENOUS BLD VENIPUNCTURE: CPT

## 2019-09-01 PROCEDURE — 80053 COMPREHEN METABOLIC PANEL: CPT

## 2019-09-01 RX ORDER — POTASSIUM CHLORIDE 20 MEQ/1
40 TABLET, EXTENDED RELEASE ORAL ONCE
Status: COMPLETED | OUTPATIENT
Start: 2019-09-01 | End: 2019-09-01

## 2019-09-01 RX ORDER — ONDANSETRON 8 MG/1
8 TABLET, ORALLY DISINTEGRATING ORAL ONCE
Status: COMPLETED | OUTPATIENT
Start: 2019-09-01 | End: 2019-09-01

## 2019-09-01 RX ADMIN — CEFTRIAXONE 2 G: 2 INJECTION, SOLUTION INTRAVENOUS at 03:09

## 2019-09-01 RX ADMIN — POTASSIUM CHLORIDE 40 MEQ: 20 TABLET, EXTENDED RELEASE ORAL at 08:09

## 2019-09-01 RX ADMIN — SENNOSIDES 8.6 MG: 8.6 TABLET, FILM COATED ORAL at 04:09

## 2019-09-01 RX ADMIN — ONDANSETRON 8 MG: 8 TABLET, ORALLY DISINTEGRATING ORAL at 08:09

## 2019-09-01 RX ADMIN — DULOXETINE 60 MG: 60 CAPSULE, DELAYED RELEASE ORAL at 08:09

## 2019-09-01 RX ADMIN — PANTOPRAZOLE SODIUM 40 MG: 40 TABLET, DELAYED RELEASE ORAL at 08:09

## 2019-09-01 RX ADMIN — ENOXAPARIN SODIUM 40 MG: 100 INJECTION SUBCUTANEOUS at 04:09

## 2019-09-01 RX ADMIN — OXYCODONE HYDROCHLORIDE 10 MG: 10 TABLET ORAL at 03:09

## 2019-09-01 RX ADMIN — OXYCODONE HYDROCHLORIDE 5 MG: 5 TABLET ORAL at 08:09

## 2019-09-01 RX ADMIN — POLYETHYLENE GLYCOL 3350 17 G: 17 POWDER, FOR SOLUTION ORAL at 08:09

## 2019-09-01 RX ADMIN — ONDANSETRON 8 MG: 8 TABLET, ORALLY DISINTEGRATING ORAL at 12:09

## 2019-09-01 RX ADMIN — ONDANSETRON 8 MG: 8 TABLET, ORALLY DISINTEGRATING ORAL at 05:09

## 2019-09-01 RX ADMIN — OXYCODONE HYDROCHLORIDE 10 MG: 10 TABLET ORAL at 05:09

## 2019-09-01 RX ADMIN — OXYCODONE HYDROCHLORIDE 10 MG: 10 TABLET ORAL at 01:09

## 2019-09-01 RX ADMIN — OXYCODONE HYDROCHLORIDE 5 MG: 5 TABLET ORAL at 12:09

## 2019-09-01 NOTE — ASSESSMENT & PLAN NOTE
At home takes PRN tylenol and ibuprofen for her chronic headaches. She describes the headache as R-sided and associated with mild photophobia and right-sided neck muscle tension, no associated neurologic deficits. Likely multifactorial from tension and medication overuse.

## 2019-09-01 NOTE — SUBJECTIVE & OBJECTIVE
Interval History: Patient feels better today but still nauseous after meals.Right-sided HA improved.  She states she is being worked up for post-prandial nausea, odynophagia and globus sensation with outpatient GI.    Review of Systems   Constitutional: Negative for chills, diaphoresis and fever.   HENT: Negative for congestion and rhinorrhea.    Eyes: Negative for visual disturbance.   Respiratory: Negative for cough and shortness of breath.    Cardiovascular: Negative for chest pain and leg swelling.   Gastrointestinal: Positive for abdominal pain and nausea. Negative for diarrhea and vomiting.   Genitourinary: Positive for dysuria, flank pain, frequency and hematuria. Negative for vaginal bleeding and vaginal discharge.   Musculoskeletal: Negative for back pain.   Skin: Negative for rash.   Neurological: Positive for headaches. Negative for syncope and weakness.   Psychiatric/Behavioral: Negative for agitation and confusion.     Objective:     Vital Signs (Most Recent):  Temp: 98.6 °F (37 °C) (09/01/19 1530)  Pulse: 66 (09/01/19 1556)  Resp: 18 (09/01/19 1556)  BP: 115/77 (09/01/19 1556)  SpO2: (!) 94 % (09/01/19 1556) Vital Signs (24h Range):  Temp:  [98.5 °F (36.9 °C)-101.3 °F (38.5 °C)] 98.6 °F (37 °C)  Pulse:  [63-92] 66  Resp:  [16-24] 18  SpO2:  [90 %-95 %] 94 %  BP: (101-135)/(67-77) 115/77     Weight: 84.3 kg (185 lb 13.6 oz)  Body mass index is 33.99 kg/m².    Intake/Output Summary (Last 24 hours) at 9/1/2019 1742  Last data filed at 9/1/2019 0500  Gross per 24 hour   Intake 420 ml   Output --   Net 420 ml      Physical Exam   Constitutional: She is oriented to person, place, and time. She appears well-developed and well-nourished.   HENT:   Head: Normocephalic and atraumatic.   Eyes: Conjunctivae and EOM are normal.   Neck: Normal range of motion. Neck supple.   Right cervical paraspinal muscle hypertrophy   Cardiovascular: Normal rate, regular rhythm, normal heart sounds and intact distal pulses.    Pulmonary/Chest: Effort normal and breath sounds normal. No respiratory distress.   Abdominal: Soft. She exhibits no distension and no mass. There is tenderness (suprapubic). There is no guarding.   Genitourinary:   Genitourinary Comments: + bilateral CVA tenderness (right > left)   Musculoskeletal: Normal range of motion. She exhibits no edema.   Lymphadenopathy:     She has no cervical adenopathy.   Neurological: She is alert and oriented to person, place, and time. No cranial nerve deficit or sensory deficit. She exhibits normal muscle tone.   Skin: Skin is warm. Capillary refill takes less than 2 seconds.   Psychiatric: She has a normal mood and affect. Thought content normal.       Significant Labs: All pertinent labs within the past 24 hours have been reviewed.  Recent Results (from the past 24 hour(s))   Comprehensive Metabolic Panel (CMP)    Collection Time: 09/01/19  7:11 AM   Result Value Ref Range    Sodium 139 136 - 145 mmol/L    Potassium 3.6 3.5 - 5.1 mmol/L    Chloride 105 95 - 110 mmol/L    CO2 25 23 - 29 mmol/L    Glucose 114 (H) 70 - 110 mg/dL    BUN, Bld 6 6 - 20 mg/dL    Creatinine 0.8 0.5 - 1.4 mg/dL    Calcium 8.9 8.7 - 10.5 mg/dL    Total Protein 6.3 6.0 - 8.4 g/dL    Albumin 2.9 (L) 3.5 - 5.2 g/dL    Total Bilirubin 0.3 0.1 - 1.0 mg/dL    Alkaline Phosphatase 77 55 - 135 U/L    AST 13 10 - 40 U/L    ALT 15 10 - 44 U/L    Anion Gap 9 8 - 16 mmol/L    eGFR if African American >60.0 >60 mL/min/1.73 m^2    eGFR if non African American >60.0 >60 mL/min/1.73 m^2   CBC with Automated Differential    Collection Time: 09/01/19  7:11 AM   Result Value Ref Range    WBC 7.74 3.90 - 12.70 K/uL    RBC 3.54 (L) 4.00 - 5.40 M/uL    Hemoglobin 10.7 (L) 12.0 - 16.0 g/dL    Hematocrit 33.5 (L) 37.0 - 48.5 %    Mean Corpuscular Volume 95 82 - 98 fL    Mean Corpuscular Hemoglobin 30.2 27.0 - 31.0 pg    Mean Corpuscular Hemoglobin Conc 31.9 (L) 32.0 - 36.0 g/dL    RDW 13.1 11.5 - 14.5 %    Platelets 161 150 -  350 K/uL    MPV 10.6 9.2 - 12.9 fL    Immature Granulocytes 0.4 0.0 - 0.5 %    Gran # (ANC) 6.3 1.8 - 7.7 K/uL    Immature Grans (Abs) 0.03 0.00 - 0.04 K/uL    Lymph # 0.5 (L) 1.0 - 4.8 K/uL    Mono # 0.9 0.3 - 1.0 K/uL    Eos # 0.0 0.0 - 0.5 K/uL    Baso # 0.02 0.00 - 0.20 K/uL    nRBC 0 0 /100 WBC    Gran% 80.7 (H) 38.0 - 73.0 %    Lymph% 6.6 (L) 18.0 - 48.0 %    Mono% 11.5 4.0 - 15.0 %    Eosinophil% 0.5 0.0 - 8.0 %    Basophil% 0.3 0.0 - 1.9 %    Differential Method Automated    Magnesium    Collection Time: 09/01/19  7:11 AM   Result Value Ref Range    Magnesium 2.1 1.6 - 2.6 mg/dL   POCT glucose    Collection Time: 09/01/19  7:39 AM   Result Value Ref Range    POCT Glucose 108 70 - 110 mg/dL         Significant Imaging: I have reviewed all pertinent imaging results/findings within the past 24 hours.

## 2019-09-01 NOTE — ASSESSMENT & PLAN NOTE
Pt admitted with urosepsis. CT c/w pyelonephritis vs possible passed kidney stone.  Pt with history of recurrent UTIs over the last year. Has seen urology as outpatient.     Blood cultures and urine culture with E. Coli. Repeat blood cultures NGTD.  Awaiting susceptibilities of E. Coli    Plan:  1. Continue rocephin 2 gram IV q 24 hours for now while awaiting susceptibilities  2. Check EKG as no documented EKG; need to check QTc as may be able to d/c on oral fluoroquinolones if susceptible  3. Continue to hydrate and control patient's nausea; monitor fevers but not unusual to see persistent fevers in setting of pyelonephritis  4. Will follow up with final recs once susceptibilities are back

## 2019-09-01 NOTE — PLAN OF CARE
Problem: Adult Inpatient Plan of Care  Goal: Plan of Care Review  Outcome: Ongoing (interventions implemented as appropriate)  -AAOx4  -LAC 20g PIV saline locked. Dressing CDI.   -UC + for ecoli -- Rocephin continued q24h.   -Blood cultures NGTD.   -Tele NSR   -Fioricet x1 dose given for HA 10/10 with minimal relief.   -Zofran given x1 for nausea.   -No other issues overnight.   -Ambulated to bathroom independently.  @ bedside attentive to pt needs. Wears non slip socks when oob. Free from falls/injuries thus far this shift.   -Bed in lowest, locked position. Bed rails up x2. Call light and personal belongings within reach.   -Will continue to monitor.

## 2019-09-01 NOTE — HOSPITAL COURSE
Patient found to have gram-negative daniele bacteria on blood culture. Placed on IV ceftriaxone 2 mg qd while awaiting culture and sensitivities. Continued to have headache and myalgias but overall improved. Had fevers overnight on 8/31. Cultures resulted with pansensitive E.coli. Plan for discharge on PO fluoroquinolone.

## 2019-09-01 NOTE — SUBJECTIVE & OBJECTIVE
Interval History: pt with tmax 101.3. Wbc count normal. Pt still feels sick with persistent N/V and decreased appetite.  at bedside.     Review of Systems   Constitutional: Positive for chills, diaphoresis and fever.   Respiratory: Negative for cough and shortness of breath.    Cardiovascular: Negative for chest pain and leg swelling.   Gastrointestinal: Positive for abdominal distention, abdominal pain, nausea and vomiting. Negative for constipation and diarrhea.   Genitourinary: Positive for dysuria. Negative for frequency and hematuria.   Musculoskeletal: Negative for back pain and myalgias.   Skin: Negative for rash and wound.   Neurological: Positive for weakness. Negative for dizziness, light-headedness and headaches.   Psychiatric/Behavioral: Negative for agitation and behavioral problems. The patient is not nervous/anxious.      Objective:     Vital Signs (Most Recent):  Temp: 98.5 °F (36.9 °C) (09/01/19 0745)  Pulse: 63 (09/01/19 1044)  Resp: 18 (09/01/19 0745)  BP: 110/74 (09/01/19 0745)  SpO2: (!) 94 % (09/01/19 0745) Vital Signs (24h Range):  Temp:  [98.5 °F (36.9 °C)-101.3 °F (38.5 °C)] 98.5 °F (36.9 °C)  Pulse:  [63-88] 63  Resp:  [16-24] 18  SpO2:  [90 %-94 %] 94 %  BP: (110-135)/(71-76) 110/74     Weight: 84.3 kg (185 lb 13.6 oz)  Body mass index is 33.99 kg/m².    Estimated Creatinine Clearance: 79.1 mL/min (based on SCr of 0.8 mg/dL).    Physical Exam   Constitutional: She is oriented to person, place, and time. She appears well-developed and well-nourished. No distress.   Cardiovascular: Normal rate and regular rhythm.   No murmur heard.  Pulmonary/Chest: Effort normal and breath sounds normal. No respiratory distress.   Abdominal: Soft. Bowel sounds are normal. She exhibits no distension. There is tenderness in the suprapubic area. There is no CVA tenderness.   Musculoskeletal: Normal range of motion. She exhibits no edema.   Neurological: She is alert and oriented to person, place, and  time.   Skin: Skin is warm and dry.   Psychiatric: She has a normal mood and affect. Her behavior is normal.       Significant Labs:   Blood Culture:   Recent Labs   Lab 08/30/19  1505 08/30/19  1729 08/31/19  1058   LABBLOO Gram stain aer bottle: Gram negative rods   Results called to and read back by: Avi Smyth RN 08/31/2019  05:15  ESCHERICHIA COLI  Susceptibility pending  * No Growth to date  No Growth to date No Growth to date  No Growth to date     CBC:   Recent Labs   Lab 08/30/19  1508 08/31/19  0637 09/01/19  0711   WBC 14.27* 11.23 7.74   HGB 12.7 11.2* 10.7*   HCT 38.3 35.4* 33.5*    169 161     CMP:   Recent Labs   Lab 08/30/19  1508 08/31/19  0637 09/01/19  0711    143 139   K 3.7 4.9 3.6    110 105   CO2 24 26 25   * 110 114*   BUN 12 8 6   CREATININE 1.0 0.9 0.8   CALCIUM 9.7 8.6* 8.9   PROT  --  6.3 6.3   ALBUMIN  --  3.1* 2.9*   BILITOT  --  0.4 0.3   ALKPHOS  --  87 77   AST  --  18 13   ALT  --  18 15   ANIONGAP 10 7* 9   EGFRNONAA >60.0 >60.0 >60.0     Urine Culture:   Recent Labs   Lab 08/30/19  1508   LABURIN PRESUMPTIVE E COLI  >100,000 cfu/ml  Identification and susceptibility pending  *     Urine Studies:   Recent Labs   Lab 08/30/19  1508   COLORU Yellow   APPEARANCEUA Cloudy*   PHUR 7.0   SPECGRAV 1.015   PROTEINUA 2+*   GLUCUA Negative   KETONESU 1+*   BILIRUBINUA Negative   OCCULTUA 1+*   NITRITE Negative   LEUKOCYTESUR 3+*   RBCUA 19*   WBCUA >100*   BACTERIA Many*   SQUAMEPITHEL 1   HYALINECASTS 0       Significant Imaging: I have reviewed all pertinent imaging results/findings within the past 24 hours.

## 2019-09-01 NOTE — ASSESSMENT & PLAN NOTE
Borderline K 3.6 likely from decreased PO intake and history of post-prandial vomiting. Repleted with KCl 40 mEq  - Daily CMP and replete as needed

## 2019-09-01 NOTE — PROGRESS NOTES
"[facility]  Infectious Disease  Progress Note    Patient Name: Mary Blair  MRN: [id number]  Admission Date: 8/30/2019  Length of Stay: 2 days  Attending Physician: Nicolle Sanchez MD  Primary Care Provider: Aldo Menard MD    Isolation Status: No active isolations  Assessment/Plan:      Pyelonephritis  Pt admitted with urosepsis. CT c/w pyelonephritis vs possible passed kidney stone.  Pt with history of recurrent UTIs over the last year. Has seen urology as outpatient.     Blood cultures and urine culture with E. Coli. Repeat blood cultures NGTD.  Awaiting susceptibilities of E. Coli    Plan:  1. Continue rocephin 2 gram IV q 24 hours for now while awaiting susceptibilities  2. Check EKG as no documented EKG; need to check QTc as may be able to d/c on oral fluoroquinolones if susceptible  3. Continue to hydrate and control patient's nausea; monitor fevers but not unusual to see persistent fevers in setting of pyelonephritis  4. Will follow up with final recs once susceptibilities are back      Anticipated Disposition: possible oral antibiotics  Thank you for your consult. I will follow-up with patient. Please contact us if you have any additional questions.  JANENE Redman Pager: [phone]    Infectious Disease  [facility]    Subjective:     Principal Problem:Sepsis    HPI: 54 year old female with PMH of plasmacytoma of L3 s/p radiation therapy who was admitted with pyelonephritis.  Patient reports on 8/27 she developed hematuria, dyrsuria, urinary frequency, suprapubic pain, and nausea. Her hematuria resolved the following day; however, her other symptoms continued with worsening flank pain. She began to have fevers (104) and chills.      In the ED, she was febrile, hypotensive,, with leukocytosis of 14, UA had pyruria, nitrite negative. CXR unremarkable. CT renal stone study shows "mild nonspecific right-sided perinephric and periureteral inflammatory " "stranding, which could correlate with symptomatology, either from a recently passed stone no longer seen within the urinary tract or right-sided pyeloureteronephritis in the proper clinical setting.  No radiodense nephrolithiasis or hydronephrosis". She was then given 2L LR with improvement of BP, dose of ceftriaxone, and Toradol/regalan for HA, and admitted to the hospital for further evaluation .     Blood cultures now showing GNR. Her urine culture shows presumptive E. Coli. Pt is improving on IV rocephin. ID consulted for recs.   Has some nausea with one episode of vomiting this am. Still with SP pain.   Interval History: pt with tmax 101.3. Wbc count normal. Pt still feels sick with persistent N/V and decreased appetite.  at bedside.     Review of Systems   Constitutional: Positive for chills, diaphoresis and fever.   Respiratory: Negative for cough and shortness of breath.    Cardiovascular: Negative for chest pain and leg swelling.   Gastrointestinal: Positive for abdominal distention, abdominal pain, nausea and vomiting. Negative for constipation and diarrhea.   Genitourinary: Positive for dysuria. Negative for frequency and hematuria.   Musculoskeletal: Negative for back pain and myalgias.   Skin: Negative for rash and wound.   Neurological: Positive for weakness. Negative for dizziness, light-headedness and headaches.   Psychiatric/Behavioral: Negative for agitation and behavioral problems. The patient is not nervous/anxious.      Objective:     Vital Signs (Most Recent):  Temp: 98.5 °F (36.9 °C) (09/01/19 0745)  Pulse: 63 (09/01/19 1044)  Resp: 18 (09/01/19 0745)  BP: 110/74 (09/01/19 0745)  SpO2: (!) 94 % (09/01/19 0745) Vital Signs (24h Range):  Temp:  [98.5 °F (36.9 °C)-101.3 °F (38.5 °C)] 98.5 °F (36.9 °C)  Pulse:  [63-88] 63  Resp:  [16-24] 18  SpO2:  [90 %-94 %] 94 %  BP: (110-135)/(71-76) 110/74     Weight: 84.3 kg (185 lb 13.6 oz)  Body mass index is 33.99 kg/m².    Estimated Creatinine " Clearance: 79.1 mL/min (based on SCr of 0.8 mg/dL).    Physical Exam   Constitutional: She is oriented to person, place, and time. She appears well-developed and well-nourished. No distress.   Cardiovascular: Normal rate and regular rhythm.   No murmur heard.  Pulmonary/Chest: Effort normal and breath sounds normal. No respiratory distress.   Abdominal: Soft. Bowel sounds are normal. She exhibits no distension. There is tenderness in the suprapubic area. There is no CVA tenderness.   Musculoskeletal: Normal range of motion. She exhibits no edema.   Neurological: She is alert and oriented to person, place, and time.   Skin: Skin is warm and dry.   Psychiatric: She has a normal mood and affect. Her behavior is normal.       Significant Labs:   Blood Culture:   Recent Labs   Lab 08/30/19  1505 08/30/19  1729 08/31/19  1058   LABBLOO Gram stain aer bottle: Gram negative rods   Results called to and read back by: Avi Smyth RN 08/31/2019  05:15  ESCHERICHIA COLI  Susceptibility pending  * No Growth to date  No Growth to date No Growth to date  No Growth to date     CBC:   Recent Labs   Lab 08/30/19  1508 08/31/19  0637 09/01/19  0711   WBC 14.27* 11.23 7.74   HGB 12.7 11.2* 10.7*   HCT 38.3 35.4* 33.5*    169 161     CMP:   Recent Labs   Lab 08/30/19  1508 08/31/19  0637 09/01/19  0711    143 139   K 3.7 4.9 3.6    110 105   CO2 24 26 25   * 110 114*   BUN 12 8 6   CREATININE 1.0 0.9 0.8   CALCIUM 9.7 8.6* 8.9   PROT  --  6.3 6.3   ALBUMIN  --  3.1* 2.9*   BILITOT  --  0.4 0.3   ALKPHOS  --  87 77   AST  --  18 13   ALT  --  18 15   ANIONGAP 10 7* 9   EGFRNONAA >60.0 >60.0 >60.0     Urine Culture:   Recent Labs   Lab 08/30/19  1508   LABURIN PRESUMPTIVE E COLI  >100,000 cfu/ml  Identification and susceptibility pending  *     Urine Studies:   Recent Labs   Lab 08/30/19  1508   COLORU Yellow   APPEARANCEUA Cloudy*   PHUR 7.0   SPECGRAV 1.015   PROTEINUA 2+*   GLUCUA Negative   KETONESU  1+*   BILIRUBINUA Negative   OCCULTUA 1+*   NITRITE Negative   LEUKOCYTESUR 3+*   RBCUA 19*   WBCUA >100*   BACTERIA Many*   SQUAMEPITHEL 1   HYALINECASTS 0       Significant Imaging: I have reviewed all pertinent imaging results/findings within the past 24 hours.

## 2019-09-01 NOTE — PROGRESS NOTES
"[facility] Hospital Medicine  Progress Note    Patient Name: Mary Blair  MRN: [id number]  Patient Class: IP- Inpatient   Admission Date: 8/30/2019  Length of Stay: 2 days  Attending Physician: Nicolle Sanchez MD  Primary Care Provider: Aldo Menard MD    [facility] Medicine Team: [facility] MED 2 Eulalio Baez DO    Subjective:     Principal Problem:Gram negative sepsis        HPI:  54 year old female with PMH of plasmacytoma of L3 s/p radiation thearpy who presents due to pyelonphritis. Patient reports on 8/27 she developed hematuria, dyrusia, increased urinary frequency,, suprapubic pain, and nausea. Her hematuria resolved the following day; however, he other symptoms continued and the pain spread to bilateral flanks (right worse than left). Wednesday she reports chills and Thursday had a T-max of a 104° F with myalgias and chills. She has been taking tylenol for her fever. In the ED, she was febrile, hypotensive at 90s/50s, labs significant for WBC of 14, UA with 3+ leukocytes, many bacteria, and >100 WBCs, nitrite negative. urine and blood cultures pending. CXR unremarkable. CT renal stone study shows "mild nonspecific right-sided perinephric and periureteral inflammatory stranding, which could correlate with symptomatology, either from a recently passed stone no longer seen within the urinary tract or right-sided pyeloureteronephritis in the proper clinical setting.  No radiodense nephrolithiasis or hydronephrosis". She was then given 2L LR with improvement of BP, dose of ceftriaxone, and Toradol/regalan for HA, and admitted to the hospital for further evaluation .     Currently, she reports her HA is improved but still with significant right flank pain. No nausea of vomiting. She denies cough, chest pain, SOB. She has had 2 loose stools since Thursday but no associated abd cramping.     Overview/Hospital Course:  Patient found to have gram-negative daniele bacteria on blood culture. " Placed on IV ceftriaxone 2 mg qd while awaiting culture and sensitivities. Continued to have headache and myalgias but overall improved. Had fevers overnight on 8/31. Cultures resulted with pansensitive E.coli. Plan for discharge on PO fluoroquinolone.    Interval History: Patient feels better today but still nauseous after meals.Right-sided HA improved.  She states she is being worked up for post-prandial nausea, odynophagia and globus sensation with outpatient GI.    Review of Systems   Constitutional: Negative for chills, diaphoresis and fever.   HENT: Negative for congestion and rhinorrhea.    Eyes: Negative for visual disturbance.   Respiratory: Negative for cough and shortness of breath.    Cardiovascular: Negative for chest pain and leg swelling.   Gastrointestinal: Positive for abdominal pain and nausea. Negative for diarrhea and vomiting.   Genitourinary: Positive for dysuria, flank pain, frequency and hematuria. Negative for vaginal bleeding and vaginal discharge.   Musculoskeletal: Negative for back pain.   Skin: Negative for rash.   Neurological: Positive for headaches. Negative for syncope and weakness.   Psychiatric/Behavioral: Negative for agitation and confusion.     Objective:     Vital Signs (Most Recent):  Temp: 98.6 °F (37 °C) (09/01/19 1530)  Pulse: 66 (09/01/19 1556)  Resp: 18 (09/01/19 1556)  BP: 115/77 (09/01/19 1556)  SpO2: (!) 94 % (09/01/19 1556) Vital Signs (24h Range):  Temp:  [98.5 °F (36.9 °C)-101.3 °F (38.5 °C)] 98.6 °F (37 °C)  Pulse:  [63-92] 66  Resp:  [16-24] 18  SpO2:  [90 %-95 %] 94 %  BP: (101-135)/(67-77) 115/77     Weight: 84.3 kg (185 lb 13.6 oz)  Body mass index is 33.99 kg/m².    Intake/Output Summary (Last 24 hours) at 9/1/2019 1742  Last data filed at 9/1/2019 0500  Gross per 24 hour   Intake 420 ml   Output --   Net 420 ml      Physical Exam   Constitutional: She is oriented to person, place, and time. She appears well-developed and well-nourished.   HENT:   Head:  Normocephalic and atraumatic.   Eyes: Conjunctivae and EOM are normal.   Neck: Normal range of motion. Neck supple.   Right cervical paraspinal muscle hypertrophy   Cardiovascular: Normal rate, regular rhythm, normal heart sounds and intact distal pulses.   Pulmonary/Chest: Effort normal and breath sounds normal. No respiratory distress.   Abdominal: Soft. She exhibits no distension and no mass. There is tenderness (suprapubic). There is no guarding.   Genitourinary:   Genitourinary Comments: + bilateral CVA tenderness (right > left)   Musculoskeletal: Normal range of motion. She exhibits no edema.   Lymphadenopathy:     She has no cervical adenopathy.   Neurological: She is alert and oriented to person, place, and time. No cranial nerve deficit or sensory deficit. She exhibits normal muscle tone.   Skin: Skin is warm. Capillary refill takes less than 2 seconds.   Psychiatric: She has a normal mood and affect. Thought content normal.       Significant Labs: All pertinent labs within the past 24 hours have been reviewed.  Recent Results (from the past 24 hour(s))   Comprehensive Metabolic Panel (CMP)    Collection Time: 09/01/19  7:11 AM   Result Value Ref Range    Sodium 139 136 - 145 mmol/L    Potassium 3.6 3.5 - 5.1 mmol/L    Chloride 105 95 - 110 mmol/L    CO2 25 23 - 29 mmol/L    Glucose 114 (H) 70 - 110 mg/dL    BUN, Bld 6 6 - 20 mg/dL    Creatinine 0.8 0.5 - 1.4 mg/dL    Calcium 8.9 8.7 - 10.5 mg/dL    Total Protein 6.3 6.0 - 8.4 g/dL    Albumin 2.9 (L) 3.5 - 5.2 g/dL    Total Bilirubin 0.3 0.1 - 1.0 mg/dL    Alkaline Phosphatase 77 55 - 135 U/L    AST 13 10 - 40 U/L    ALT 15 10 - 44 U/L    Anion Gap 9 8 - 16 mmol/L    eGFR if African American >60.0 >60 mL/min/1.73 m^2    eGFR if non African American >60.0 >60 mL/min/1.73 m^2   CBC with Automated Differential    Collection Time: 09/01/19  7:11 AM   Result Value Ref Range    WBC 7.74 3.90 - 12.70 K/uL    RBC 3.54 (L) 4.00 - 5.40 M/uL    Hemoglobin 10.7 (L)  12.0 - 16.0 g/dL    Hematocrit 33.5 (L) 37.0 - 48.5 %    Mean Corpuscular Volume 95 82 - 98 fL    Mean Corpuscular Hemoglobin 30.2 27.0 - 31.0 pg    Mean Corpuscular Hemoglobin Conc 31.9 (L) 32.0 - 36.0 g/dL    RDW 13.1 11.5 - 14.5 %    Platelets 161 150 - 350 K/uL    MPV 10.6 9.2 - 12.9 fL    Immature Granulocytes 0.4 0.0 - 0.5 %    Gran # (ANC) 6.3 1.8 - 7.7 K/uL    Immature Grans (Abs) 0.03 0.00 - 0.04 K/uL    Lymph # 0.5 (L) 1.0 - 4.8 K/uL    Mono # 0.9 0.3 - 1.0 K/uL    Eos # 0.0 0.0 - 0.5 K/uL    Baso # 0.02 0.00 - 0.20 K/uL    nRBC 0 0 /100 WBC    Gran% 80.7 (H) 38.0 - 73.0 %    Lymph% 6.6 (L) 18.0 - 48.0 %    Mono% 11.5 4.0 - 15.0 %    Eosinophil% 0.5 0.0 - 8.0 %    Basophil% 0.3 0.0 - 1.9 %    Differential Method Automated    Magnesium    Collection Time: 09/01/19  7:11 AM   Result Value Ref Range    Magnesium 2.1 1.6 - 2.6 mg/dL   POCT glucose    Collection Time: 09/01/19  7:39 AM   Result Value Ref Range    POCT Glucose 108 70 - 110 mg/dL         Significant Imaging: I have reviewed all pertinent imaging results/findings within the past 24 hours.      Assessment/Plan:      * Gram negative sepsis  - See pyelonephritis    Hypomagnesemia  Borderline Mg 1.9. In the setting of headache we repleted with 1g mag sulfate IV.  - Daily Mg and will replete as needed    Hypokalemia  Borderline K 3.6 likely from decreased PO intake and history of post-prandial vomiting. Repleted with KCl 40 mEq  - Daily CMP and replete as needed      Episodic tension-type headache, not intractable  At home takes PRN tylenol and ibuprofen for her chronic headaches. She describes the headache as R-sided and associated with mild photophobia and right-sided neck muscle tension, no associated neurologic deficits. Likely multifactorial from tension and medication overuse.      Pyelonephritis  On admission patient was febrile, with elevated white count, UA consitent with nitrite negative UTI. CT with perinephric stranding and ureter  inflammation. Infection could be secondary to passed stone considering her complaints of severe flank pain, however she has had recurrent UTIs since being diagnosed with plasmacytoma and having radiation tx.   DDX: UTI, pyelonephritis, urosepsis, nephrolithiasis     - Blood culture and urine culture positive for pansensitive E. Coli. Had fever overnight on 8/31.  - IV Ceftriaxone 2g q 24hr, will transition to PO fluoroquinolone on discharge  - Given maintenance IVF before patient started PO intake  - norco PRN for pain  - zofran PRN for n/v  - Daily CBC    Plasmacytoma  - playmacytoma of L3 s/p radiation. Still followed by heme onc. 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

## 2019-09-01 NOTE — ASSESSMENT & PLAN NOTE
On admission patient was febrile, with elevated white count, UA consitent with nitrite negative UTI. CT with perinephric stranding and ureter inflammation. Infection could be secondary to passed stone considering her complaints of severe flank pain, however she has had recurrent UTIs since being diagnosed with plasmacytoma and having radiation tx.   DDX: UTI, pyelonephritis, urosepsis, nephrolithiasis     - Blood culture and urine culture positive for pansensitive E. Coli. Had fever overnight on 8/31.  - IV Ceftriaxone 2g q 24hr, will transition to PO fluoroquinolone on discharge  - Given maintenance IVF before patient started PO intake  - norco PRN for pain  - zofran PRN for n/v  - Daily CBC

## 2019-09-01 NOTE — ASSESSMENT & PLAN NOTE
Borderline Mg 1.9. In the setting of headache we repleted with 1g mag sulfate IV.  - Daily Mg and will replete as needed

## 2019-09-02 VITALS
RESPIRATION RATE: 17 BRPM | WEIGHT: 185.88 LBS | SYSTOLIC BLOOD PRESSURE: 113 MMHG | OXYGEN SATURATION: 96 % | HEART RATE: 68 BPM | DIASTOLIC BLOOD PRESSURE: 74 MMHG | HEIGHT: 62 IN | BODY MASS INDEX: 34.2 KG/M2 | TEMPERATURE: 99 F

## 2019-09-02 LAB
ALBUMIN SERPL BCP-MCNC: 2.9 G/DL (ref 3.5–5.2)
ALP SERPL-CCNC: 83 U/L (ref 55–135)
ALT SERPL W/O P-5'-P-CCNC: 16 U/L (ref 10–44)
ANION GAP SERPL CALC-SCNC: 9 MMOL/L (ref 8–16)
AST SERPL-CCNC: 16 U/L (ref 10–40)
BACTERIA BLD CULT: ABNORMAL
BASOPHILS # BLD AUTO: 0.03 K/UL (ref 0–0.2)
BASOPHILS NFR BLD: 0.8 % (ref 0–1.9)
BILIRUB SERPL-MCNC: 0.2 MG/DL (ref 0.1–1)
BUN SERPL-MCNC: 9 MG/DL (ref 6–20)
CALCIUM SERPL-MCNC: 9 MG/DL (ref 8.7–10.5)
CHLORIDE SERPL-SCNC: 103 MMOL/L (ref 95–110)
CO2 SERPL-SCNC: 27 MMOL/L (ref 23–29)
CREAT SERPL-MCNC: 1 MG/DL (ref 0.5–1.4)
DIFFERENTIAL METHOD: ABNORMAL
EOSINOPHIL # BLD AUTO: 0.1 K/UL (ref 0–0.5)
EOSINOPHIL NFR BLD: 2.1 % (ref 0–8)
ERYTHROCYTE [DISTWIDTH] IN BLOOD BY AUTOMATED COUNT: 12.7 % (ref 11.5–14.5)
EST. GFR  (AFRICAN AMERICAN): >60 ML/MIN/1.73 M^2
EST. GFR  (NON AFRICAN AMERICAN): >60 ML/MIN/1.73 M^2
GLUCOSE SERPL-MCNC: 100 MG/DL (ref 70–110)
HCT VFR BLD AUTO: 33.6 % (ref 37–48.5)
HGB BLD-MCNC: 10.5 G/DL (ref 12–16)
IMM GRANULOCYTES # BLD AUTO: 0.01 K/UL (ref 0–0.04)
IMM GRANULOCYTES NFR BLD AUTO: 0.3 % (ref 0–0.5)
LYMPHOCYTES # BLD AUTO: 0.7 K/UL (ref 1–4.8)
LYMPHOCYTES NFR BLD: 18.3 % (ref 18–48)
MCH RBC QN AUTO: 30.2 PG (ref 27–31)
MCHC RBC AUTO-ENTMCNC: 31.3 G/DL (ref 32–36)
MCV RBC AUTO: 97 FL (ref 82–98)
MONOCYTES # BLD AUTO: 0.6 K/UL (ref 0.3–1)
MONOCYTES NFR BLD: 15.8 % (ref 4–15)
NEUTROPHILS # BLD AUTO: 2.4 K/UL (ref 1.8–7.7)
NEUTROPHILS NFR BLD: 62.7 % (ref 38–73)
NRBC BLD-RTO: 0 /100 WBC
PLATELET # BLD AUTO: 175 K/UL (ref 150–350)
PMV BLD AUTO: 10.2 FL (ref 9.2–12.9)
POTASSIUM SERPL-SCNC: 3.7 MMOL/L (ref 3.5–5.1)
PROT SERPL-MCNC: 6.4 G/DL (ref 6–8.4)
RBC # BLD AUTO: 3.48 M/UL (ref 4–5.4)
SODIUM SERPL-SCNC: 139 MMOL/L (ref 136–145)
WBC # BLD AUTO: 3.87 K/UL (ref 3.9–12.7)

## 2019-09-02 PROCEDURE — 85025 COMPLETE CBC W/AUTO DIFF WBC: CPT

## 2019-09-02 PROCEDURE — 25000003 PHARM REV CODE 250: Performed by: STUDENT IN AN ORGANIZED HEALTH CARE EDUCATION/TRAINING PROGRAM

## 2019-09-02 PROCEDURE — 36415 COLL VENOUS BLD VENIPUNCTURE: CPT

## 2019-09-02 PROCEDURE — 99900038 HC OT GENERIC THERAPY SCREENING (STAT)

## 2019-09-02 PROCEDURE — G0378 HOSPITAL OBSERVATION PER HR: HCPCS

## 2019-09-02 PROCEDURE — 80053 COMPREHEN METABOLIC PANEL: CPT

## 2019-09-02 PROCEDURE — 63600175 PHARM REV CODE 636 W HCPCS: Performed by: STUDENT IN AN ORGANIZED HEALTH CARE EDUCATION/TRAINING PROGRAM

## 2019-09-02 RX ORDER — CIPROFLOXACIN 500 MG/1
500 TABLET ORAL EVERY 12 HOURS
Qty: 24 TABLET | Refills: 0 | Status: SHIPPED | OUTPATIENT
Start: 2019-09-03 | End: 2019-09-15

## 2019-09-02 RX ORDER — ONDANSETRON 8 MG/1
8 TABLET, ORALLY DISINTEGRATING ORAL EVERY 8 HOURS PRN
Qty: 24 TABLET | Refills: 0 | Status: SHIPPED | OUTPATIENT
Start: 2019-09-02 | End: 2019-09-02

## 2019-09-02 RX ORDER — CIPROFLOXACIN 500 MG/1
500 TABLET ORAL EVERY 12 HOURS
Qty: 26 TABLET | Refills: 0 | Status: SHIPPED | OUTPATIENT
Start: 2019-09-02 | End: 2019-09-02 | Stop reason: SDUPTHER

## 2019-09-02 RX ORDER — ONDANSETRON 8 MG/1
8 TABLET, ORALLY DISINTEGRATING ORAL EVERY 8 HOURS PRN
Qty: 24 TABLET | Refills: 0 | Status: SHIPPED | OUTPATIENT
Start: 2019-09-02 | End: 2021-03-05

## 2019-09-02 RX ADMIN — SENNOSIDES 8.6 MG: 8.6 TABLET, FILM COATED ORAL at 09:09

## 2019-09-02 RX ADMIN — ONDANSETRON 8 MG: 8 TABLET, ORALLY DISINTEGRATING ORAL at 08:09

## 2019-09-02 RX ADMIN — OXYCODONE HYDROCHLORIDE 5 MG: 5 TABLET ORAL at 09:09

## 2019-09-02 RX ADMIN — PANTOPRAZOLE SODIUM 40 MG: 40 TABLET, DELAYED RELEASE ORAL at 08:09

## 2019-09-02 RX ADMIN — POLYETHYLENE GLYCOL 3350 17 G: 17 POWDER, FOR SOLUTION ORAL at 08:09

## 2019-09-02 RX ADMIN — DULOXETINE 60 MG: 60 CAPSULE, DELAYED RELEASE ORAL at 08:09

## 2019-09-02 RX ADMIN — CEFTRIAXONE 2 G: 2 INJECTION, SOLUTION INTRAVENOUS at 01:09

## 2019-09-02 RX ADMIN — OXYCODONE HYDROCHLORIDE 5 MG: 5 TABLET ORAL at 03:09

## 2019-09-02 NOTE — NURSING
Pt discharged via wheelchair by . Pt left with personal belongings & discharge instructions. Discharge instructions were reviewed with pt & , pt verbalized understanding to all.  Pt denies pain or other need at time of discharge.

## 2019-09-02 NOTE — PLAN OF CARE
Chart reviewed.  Blood culture susceptibilities still pending. E. Coli in urine noted to be pan-sensitive. If blood culture sensitivities match urine sensitivities, then would be okay to d/c home on cipro 500 mg PO BID for E. Coli bacteremia/pyelonephritis to complete a total of 14 days from date of culture clearance. (estimated end date: 9/14/19)  EKG reviewed. No QTc prolongation.  F/u with ID and Uro-gyn after discharge    Please call with questions.  Thanks, Galina Johnston PA-C  Pager: [phone]

## 2019-09-02 NOTE — PT/OT/SLP PROGRESS
Occupational Therapy      Patient Name:  Mary Blair   MRN:  [id number]    OT orders received. Pt, nsg and fly report pt remains independent with basic mobility/ADL skills at this time. Pt/fly report no need for OT services at this time. Pt reports continued headache and desire to remain in bed at time of OT arrival. Pt/fly receptive to d/c of OT orders this date. No goals or charges posted to account this date.       Tammy Irene, LOTR  9/2/2019

## 2019-09-02 NOTE — PLAN OF CARE
Problem: Adult Inpatient Plan of Care  Goal: Plan of Care Review  Outcome: Ongoing (interventions implemented as appropriate)  -AAOx4  -LAC 20g PIV saline locked. Dressing CDI.   -UC + for ecoli -- Rocephin continued q24h.   -Blood cultures NGTD.   -Tele NSR   -Still c/o HA oxy 5mg given with some relief.   -Zofran given x1 for nausea.   -No other issues overnight.   -Ambulated to bathroom independently.  @ bedside attentive to pt needs. Wears non slip socks when oob. Free from falls/injuries thus far this shift.   -Bed in lowest, locked position. Bed rails up x2. Call light and personal belongings within reach.   -Will continue to monitor.

## 2019-09-02 NOTE — NURSING
Paged Dr. Pablo x3 & KENNY Sanchez x1 with IM re: bedside delivery of meds. Pharmacy closed &pt is wanting to just get her meds forwarded to her personal pharmacy as pt is ready to leave. No response from team yet.

## 2019-09-02 NOTE — NURSING
Ok per Dr. Pablo to administer Rocephin early and d/c pt. MD to update Cipro orders and send meds to pt's pharmacy.

## 2019-09-03 ENCOUNTER — TELEPHONE (OUTPATIENT)
Dept: HEMATOLOGY/ONCOLOGY | Facility: CLINIC | Age: 54
End: 2019-09-03

## 2019-09-03 NOTE — TELEPHONE ENCOUNTER
----- Message from Ria Aranda sent at 9/3/2019  9:31 AM CDT -----  Contact: self  Pt called to shayna appt pt not feeling well.        Pt callback number [phone]

## 2019-09-03 NOTE — ASSESSMENT & PLAN NOTE
On admission patient was febrile, with elevated white count, UA consitent with nitrite negative UTI. CT with perinephric stranding and ureter inflammation. Infection could be secondary to passed stone considering her complaints of severe flank pain, however she has had recurrent UTIs since being diagnosed with plasmacytoma and having radiation tx.   DDX: UTI, pyelonephritis, urosepsis, nephrolithiasis     - Blood culture and urine culture positive for pansensitive E. Coli.  - Ceftriaxone transitioned to PO cipro on 8/2/19 per ID recommendations.  - Patient to complete 2 week course of antibiotics from date of first clear cultures (8/31/19)

## 2019-09-03 NOTE — DISCHARGE SUMMARY
"[facility] Hospital Medicine  Discharge Summary      Patient Name: Mary Blair  MRN: [id number]  Admission Date: 8/30/2019  Hospital Length of Stay: 3 days  Discharge Date and Time: 9/2/2019  3:29 PM  Attending Physician: No att. providers found   Discharging Provider: Sravan Pablo MD  Primary Care Provider: Aldo Menard MD  [facility] Medicine Team: [facility] HOSP MED 2 Sravan Pablo MD    HPI:   54 year old female with PMH of plasmacytoma of L3 s/p radiation thearpy who presents due to pyelonphritis. Patient reports on 8/27 she developed hematuria, dyrusia, increased urinary frequency,, suprapubic pain, and nausea. Her hematuria resolved the following day; however, he other symptoms continued and the pain spread to bilateral flanks (right worse than left). Wednesday she reports chills and Thursday had a T-max of a 104° F with myalgias and chills. She has been taking tylenol for her fever. In the ED, she was febrile, hypotensive at 90s/50s, labs significant for WBC of 14, UA with 3+ leukocytes, many bacteria, and >100 WBCs, nitrite negative. urine and blood cultures pending. CXR unremarkable. CT renal stone study shows "mild nonspecific right-sided perinephric and periureteral inflammatory stranding, which could correlate with symptomatology, either from a recently passed stone no longer seen within the urinary tract or right-sided pyeloureteronephritis in the proper clinical setting.  No radiodense nephrolithiasis or hydronephrosis". She was then given 2L LR with improvement of BP, dose of ceftriaxone, and Toradol/regalan for HA, and admitted to the hospital for further evaluation .     Currently, she reports her HA is improved but still with significant right flank pain. No nausea of vomiting. She denies cough, chest pain, SOB. She has had 2 loose stools since Thursday but no associated abd cramping.     * No surgery found *      Hospital Course:   Patient found to have " gram-negative daniele bacteria on blood culture. Placed on IV ceftriaxone 2 mg qd while awaiting culture and sensitivities. Continued to have headache and myalgias but overall improved. Had fevers overnight on 8/31. Cultures resulted with pansensitive E.coli. Last positive cultures 8/31/19. Transitioned to PO cipro on 9/2/19. Patient to complete 2 week course from last positive cultures. Discharged on 9/2/19 in good condition, able to tolerate PO and afebrile. Patient will need follow up with PCP after completion of course of antibiotics. Instructed on symptoms that should prompt evaluation in ED.    Physical Exam   Constitutional: She is oriented to person, place, and time. She appears well-developed and well-nourished.   HENT:   Head: Normocephalic and atraumatic.   Eyes: Conjunctivae and EOM are normal.   Neck: Normal range of motion. Neck supple.   Cardiovascular: Normal rate, regular rhythm, normal heart sounds and intact distal pulses.   Pulmonary/Chest: Effort normal and breath sounds normal. No respiratory distress.   Abdominal: Soft. She exhibits no distension and no mass. There is tenderness (suprapubic). There is no guarding.   Musculoskeletal: Normal range of motion. She exhibits no edema.   Lymphadenopathy:     She has no cervical adenopathy.   Neurological: She is alert and oriented to person, place, and time. No cranial nerve deficit or sensory deficit. She exhibits normal muscle tone.   Skin: Skin is warm. Capillary refill takes less than 2 seconds.   Psychiatric: She has a normal mood and affect. Thought content normal.     Consults:   Consults (From admission, onward)        Status Ordering Provider     Inpatient consult to Infectious Diseases  Once     Provider:  (Not yet assigned)    STELLA Simpson          Pyelonephritis  On admission patient was febrile, with elevated white count, UA consitent with nitrite negative UTI. CT with perinephric stranding and ureter inflammation. Infection  could be secondary to passed stone considering her complaints of severe flank pain, however she has had recurrent UTIs since being diagnosed with plasmacytoma and having radiation tx.   DDX: UTI, pyelonephritis, urosepsis, nephrolithiasis     - Blood culture and urine culture positive for pansensitive E. Coli.  - Ceftriaxone transitioned to PO cipro on 8/2/19 per ID recommendations.  - Patient to complete 2 week course of antibiotics from date of first clear cultures (8/31/19)        Final Active Diagnoses:    Diagnosis Date Noted POA    PRINCIPAL PROBLEM:  Gram negative sepsis [A41.50]  Yes    Episodic tension-type headache, not intractable [G44.219] 09/01/2019 Yes    Hypokalemia [E87.6] 09/01/2019 No    Hypomagnesemia [E83.42] 09/01/2019 No    Pyelonephritis [N12] 08/30/2019 Yes    Plasmacytoma [C90.30] 02/03/2019 Yes      Problems Resolved During this Admission:       Discharged Condition: good    Disposition: Home or Self Care    Follow Up:    Patient Instructions:      Diet Adult Regular     Notify your health care provider if you experience any of the following:  temperature >100.4     Notify your health care provider if you experience any of the following:  persistent nausea and vomiting or diarrhea     Notify your health care provider if you experience any of the following:  severe uncontrolled pain     Activity as tolerated       Significant Diagnostic Studies: Labs:   CMP   Recent Labs   Lab 09/02/19  0619      K 3.7      CO2 27      BUN 9   CREATININE 1.0   CALCIUM 9.0   PROT 6.4   ALBUMIN 2.9*   BILITOT 0.2   ALKPHOS 83   AST 16   ALT 16   ANIONGAP 9   ESTGFRAFRICA >60.0   EGFRNONAA >60.0   , CBC   Recent Labs   Lab 09/02/19  0619   WBC 3.87*   HGB 10.5*   HCT 33.6*       and All labs within the past 24 hours have been reviewed  Microbiology:     Microbiology Results (last 7 days)     Procedure Component Value Units Date/Time    Blood culture #1 **CANNOT BE ORDERED STAT**  [[id number]]  (Abnormal)  (Susceptibility) Collected:  08/30/19 1505    Order Status:  Completed Specimen:  Blood from Peripheral, Antecubital, Left Updated:  09/02/19 0846     Blood Culture, Routine Gram stain aer bottle: Gram negative rods       Results called to and read back by: Avi Smyth RN 08/31/2019  05:15      ESCHERICHIA COLI    Urine culture [[id number]]  (Abnormal)  (Susceptibility) Collected:  08/30/19 1508    Order Status:  Completed Specimen:  Urine Updated:  09/01/19 1229     Urine Culture, Routine ESCHERICHIA COLI  >100,000 cfu/ml      Narrative:       Preferred Collection Type->Urine, Clean Catch    Gram stain [[id number]] Collected:  08/31/19 0141    Order Status:  Completed Specimen:  Urine, Clean Catch Updated:  08/31/19 0749     Gram Stain Result Rare WBC's      No organisms seen          Pending Diagnostic Studies:     None         Medications:  Reconciled Home Medications:      Medication List      START taking these medications    ciprofloxacin HCl 500 MG tablet  Commonly known as:  CIPRO  Take 1 tablet (500 mg total) by mouth every 12 (twelve) hours. for 12 days     ondansetron 8 MG Tbdl  Commonly known as:  ZOFRAN-ODT  Take 1 tablet (8 mg total) by mouth every 8 (eight) hours as needed (Nausea).        CONTINUE taking these medications    CYMBALTA 60 MG capsule  Generic drug:  DULoxetine  Take 60 mg by mouth once daily.     DUAVEE 0.45-20 mg Tab  Generic drug:  conj estrogens-bazedoxifene  Take 1 tablet by mouth once daily.            Indwelling Lines/Drains at time of discharge:   Lines/Drains/Airways          None          Time spent on the discharge of patient: 45 minutes  Patient was seen and examined on the date of discharge and determined to be suitable for discharge.         Sravan Pablo MD  Department of Hospital Medicine  [facility]

## 2019-09-04 ENCOUNTER — PATIENT OUTREACH (OUTPATIENT)
Dept: ADMINISTRATIVE | Facility: CLINIC | Age: 54
End: 2019-09-04

## 2019-09-04 LAB — BACTERIA BLD CULT: NORMAL

## 2019-09-04 RX ORDER — DEXTROMETHORPHAN HYDROBROMIDE, GUAIFENESIN 5; 100 MG/5ML; MG/5ML
650 LIQUID ORAL EVERY 8 HOURS
COMMUNITY
End: 2021-03-05

## 2019-09-04 NOTE — PATIENT INSTRUCTIONS
Discharge Instructions for Pyelonephritis  You have been told you have a kidney infection. This is called pyelonephritis. The infection can be serious. It can damage your kidneys and cause bacteria to enter your bloodstream. You were treated in the hospital. Once you return home, heres what you can do at home to your recovery and prevent future infections.  Home Care   Take all the medication you were prescribed, even if you feel better. Not finishing the medication can make the infection come back. It may also make a future infection harder to treat.  Unless told not to by your healthcare provider, drink 8 to 12 glasses of fluid every day. Clear fluids, such as water, are best. This may help flush the infection from your system.  Preventing Future Infection   Keep your genital area clean. Use mild soap. Rinse with water.  If you are a woman, always wipe the genital area from front to back.  Urinate frequently. Avoid holding urine in the bladder for a long time.  Always urinate after sexual intercourse.  Follow-Up   Make a follow-up appointment. And see your doctor for regular lab tests as directed. Our staff can help you with this.    When to Call Your Doctor  Call your doctor right away if you have any of the following:  Decreased urine output or trouble urinating  Severe pain in the lower back or flank  Fever above 100.4 °F or shaking chills  Vomiting  Blood in your urine  Dark-colored or foul-smelling urine  Nausea or other problems that prevent you from taking your prescribed medication   © 5919-0693 [employer] [facility], [address] [place] [address], [place] [address]. All rights reserved. This information is not intended as a substitute for professional medical care. Always follow your healthcare professional's instructions.     Sepsis   Sepsis occurs when your body responds to bacteremia - the presence of bacteria in your bloodstream. Sepsis can be deadly. Blood pressure may drop and the lungs and kidneys may start to  fail. Emergency care for sepsis is crucial   When to Go to the Emergency Department (ED)   Sepsis is a medical emergency. Go to the nearest emergency department if a fever is present with any of these symptoms:   Chills and shaking   Fever or low body temperature (hypothermia)   Rapid heartbeat and breathing; shortness of breath   Nausea or vomiting   Confusion, dizziness   Skin rash   Decreased urination  © 8993-1334 [employer], [address] [place] [address], [place] [address]. All rights reserved. This information is not intended as a substitute for professional medical care. Always follow your healthcare professional's instructions

## 2019-09-04 NOTE — TELEPHONE ENCOUNTER
C3 nurse attempted to contact patient. No answer. The following message was left for the patient to return the call:  Good morning, my name is Myra and I am a registered nurse calling on behalf of [facility] from the [facility].  This is a Transitional Care call for Mary Blair.  When you have a moment please contact us at [phone] between 8 and 4, Monday through Friday. If you have questions or issues, a nurse is available 24 hours every day at our ON CALL # thats [phone]. On behalf of [facility], thank you, and have a nice day.  The patient does not have a scheduled HOSFU appointment within 7 days post hospital discharge date 9/2/2019. Message sent to Physician staff to assist with HOSFU appointment scheduling.

## 2019-09-05 LAB
BACTERIA BLD CULT: NORMAL
BACTERIA BLD CULT: NORMAL

## 2019-09-10 ENCOUNTER — PATIENT MESSAGE (OUTPATIENT)
Dept: HEMATOLOGY/ONCOLOGY | Facility: CLINIC | Age: 54
End: 2019-09-10

## 2019-09-24 ENCOUNTER — LAB VISIT (OUTPATIENT)
Dept: LAB | Facility: HOSPITAL | Age: 54
End: 2019-09-24
Payer: COMMERCIAL

## 2019-09-24 ENCOUNTER — OFFICE VISIT (OUTPATIENT)
Dept: HEMATOLOGY/ONCOLOGY | Facility: CLINIC | Age: 54
End: 2019-09-24
Payer: COMMERCIAL

## 2019-09-24 VITALS
HEART RATE: 73 BPM | SYSTOLIC BLOOD PRESSURE: 118 MMHG | HEIGHT: 62 IN | OXYGEN SATURATION: 95 % | WEIGHT: 182.75 LBS | RESPIRATION RATE: 16 BRPM | DIASTOLIC BLOOD PRESSURE: 70 MMHG | BODY MASS INDEX: 33.63 KG/M2 | TEMPERATURE: 99 F

## 2019-09-24 DIAGNOSIS — Z92.3 HISTORY OF RADIATION EXPOSURE TO SPINE: ICD-10-CM

## 2019-09-24 DIAGNOSIS — G56.03 BILATERAL CARPAL TUNNEL SYNDROME: ICD-10-CM

## 2019-09-24 DIAGNOSIS — M54.9 BACK PAIN, UNSPECIFIED BACK LOCATION, UNSPECIFIED BACK PAIN LATERALITY, UNSPECIFIED CHRONICITY: ICD-10-CM

## 2019-09-24 DIAGNOSIS — C90.30 PLASMACYTOMA: ICD-10-CM

## 2019-09-24 DIAGNOSIS — C90.30 PLASMACYTOMA: Primary | ICD-10-CM

## 2019-09-24 DIAGNOSIS — G95.9 CERVICAL MYELOPATHY: ICD-10-CM

## 2019-09-24 DIAGNOSIS — M67.912 DYSFUNCTION OF LEFT ROTATOR CUFF: ICD-10-CM

## 2019-09-24 LAB
ALBUMIN SERPL BCP-MCNC: 3.9 G/DL (ref 3.5–5.2)
ALP SERPL-CCNC: 81 U/L (ref 55–135)
ALT SERPL W/O P-5'-P-CCNC: 25 U/L (ref 10–44)
ANION GAP SERPL CALC-SCNC: 10 MMOL/L (ref 8–16)
AST SERPL-CCNC: 24 U/L (ref 10–40)
B2 MICROGLOB SERPL-MCNC: 1.9 UG/ML (ref 0–2.5)
BASOPHILS # BLD AUTO: 0.04 K/UL (ref 0–0.2)
BASOPHILS NFR BLD: 0.9 % (ref 0–1.9)
BILIRUB SERPL-MCNC: 0.2 MG/DL (ref 0.1–1)
BUN SERPL-MCNC: 12 MG/DL (ref 6–20)
CALCIUM SERPL-MCNC: 9.3 MG/DL (ref 8.7–10.5)
CHLORIDE SERPL-SCNC: 107 MMOL/L (ref 95–110)
CO2 SERPL-SCNC: 25 MMOL/L (ref 23–29)
CREAT SERPL-MCNC: 0.9 MG/DL (ref 0.5–1.4)
DIFFERENTIAL METHOD: ABNORMAL
EOSINOPHIL # BLD AUTO: 0.1 K/UL (ref 0–0.5)
EOSINOPHIL NFR BLD: 1.9 % (ref 0–8)
ERYTHROCYTE [DISTWIDTH] IN BLOOD BY AUTOMATED COUNT: 13.2 % (ref 11.5–14.5)
EST. GFR  (AFRICAN AMERICAN): >60 ML/MIN/1.73 M^2
EST. GFR  (NON AFRICAN AMERICAN): >60 ML/MIN/1.73 M^2
GLUCOSE SERPL-MCNC: 87 MG/DL (ref 70–110)
HCT VFR BLD AUTO: 36.1 % (ref 37–48.5)
HGB BLD-MCNC: 11.9 G/DL (ref 12–16)
IGA SERPL-MCNC: 82 MG/DL (ref 40–350)
IGG SERPL-MCNC: 857 MG/DL (ref 650–1600)
IGM SERPL-MCNC: 117 MG/DL (ref 50–300)
IMM GRANULOCYTES # BLD AUTO: 0.01 K/UL (ref 0–0.04)
IMM GRANULOCYTES NFR BLD AUTO: 0.2 % (ref 0–0.5)
LDH SERPL L TO P-CCNC: 160 U/L (ref 110–260)
LYMPHOCYTES # BLD AUTO: 1.3 K/UL (ref 1–4.8)
LYMPHOCYTES NFR BLD: 31.1 % (ref 18–48)
MCH RBC QN AUTO: 30.5 PG (ref 27–31)
MCHC RBC AUTO-ENTMCNC: 33 G/DL (ref 32–36)
MCV RBC AUTO: 93 FL (ref 82–98)
MONOCYTES # BLD AUTO: 0.4 K/UL (ref 0.3–1)
MONOCYTES NFR BLD: 9.7 % (ref 4–15)
NEUTROPHILS # BLD AUTO: 2.4 K/UL (ref 1.8–7.7)
NEUTROPHILS NFR BLD: 56.2 % (ref 38–73)
NRBC BLD-RTO: 0 /100 WBC
PLATELET # BLD AUTO: 192 K/UL (ref 150–350)
PLATELET BLD QL SMEAR: ABNORMAL
PMV BLD AUTO: 10.5 FL (ref 9.2–12.9)
POTASSIUM SERPL-SCNC: 4 MMOL/L (ref 3.5–5.1)
PROT SERPL-MCNC: 7.1 G/DL (ref 6–8.4)
RBC # BLD AUTO: 3.9 M/UL (ref 4–5.4)
SODIUM SERPL-SCNC: 142 MMOL/L (ref 136–145)
URATE SERPL-MCNC: 3.6 MG/DL (ref 2.4–5.7)
WBC # BLD AUTO: 4.31 K/UL (ref 3.9–12.7)

## 2019-09-24 PROCEDURE — 85025 COMPLETE CBC W/AUTO DIFF WBC: CPT

## 2019-09-24 PROCEDURE — 3008F BODY MASS INDEX DOCD: CPT | Mod: CPTII,S$GLB,, | Performed by: STUDENT IN AN ORGANIZED HEALTH CARE EDUCATION/TRAINING PROGRAM

## 2019-09-24 PROCEDURE — 84550 ASSAY OF BLOOD/URIC ACID: CPT

## 2019-09-24 PROCEDURE — 80053 COMPREHEN METABOLIC PANEL: CPT

## 2019-09-24 PROCEDURE — 82232 ASSAY OF BETA-2 PROTEIN: CPT

## 2019-09-24 PROCEDURE — 84165 PATHOLOGIST INTERPRETATION SPE: ICD-10-PCS | Mod: 26,,, | Performed by: PATHOLOGY

## 2019-09-24 PROCEDURE — 99999 PR PBB SHADOW E&M-EST. PATIENT-LVL III: CPT | Mod: PBBFAC,,, | Performed by: STUDENT IN AN ORGANIZED HEALTH CARE EDUCATION/TRAINING PROGRAM

## 2019-09-24 PROCEDURE — 99214 OFFICE O/P EST MOD 30 MIN: CPT | Mod: S$GLB,,, | Performed by: STUDENT IN AN ORGANIZED HEALTH CARE EDUCATION/TRAINING PROGRAM

## 2019-09-24 PROCEDURE — 82784 ASSAY IGA/IGD/IGG/IGM EACH: CPT | Mod: 59

## 2019-09-24 PROCEDURE — 86334 IMMUNOFIX E-PHORESIS SERUM: CPT | Mod: 26,,, | Performed by: PATHOLOGY

## 2019-09-24 PROCEDURE — 36415 COLL VENOUS BLD VENIPUNCTURE: CPT

## 2019-09-24 PROCEDURE — 84165 PROTEIN E-PHORESIS SERUM: CPT | Mod: 26,,, | Performed by: PATHOLOGY

## 2019-09-24 PROCEDURE — 99214 PR OFFICE/OUTPT VISIT, EST, LEVL IV, 30-39 MIN: ICD-10-PCS | Mod: S$GLB,,, | Performed by: STUDENT IN AN ORGANIZED HEALTH CARE EDUCATION/TRAINING PROGRAM

## 2019-09-24 PROCEDURE — 86334 PATHOLOGIST INTERPRETATION IFE: ICD-10-PCS | Mod: 26,,, | Performed by: PATHOLOGY

## 2019-09-24 PROCEDURE — 83615 LACTATE (LD) (LDH) ENZYME: CPT

## 2019-09-24 PROCEDURE — 99999 PR PBB SHADOW E&M-EST. PATIENT-LVL III: ICD-10-PCS | Mod: PBBFAC,,, | Performed by: STUDENT IN AN ORGANIZED HEALTH CARE EDUCATION/TRAINING PROGRAM

## 2019-09-24 PROCEDURE — 83520 IMMUNOASSAY QUANT NOS NONAB: CPT | Mod: 59

## 2019-09-24 PROCEDURE — 84165 PROTEIN E-PHORESIS SERUM: CPT

## 2019-09-24 PROCEDURE — 86334 IMMUNOFIX E-PHORESIS SERUM: CPT

## 2019-09-24 PROCEDURE — 3008F PR BODY MASS INDEX (BMI) DOCUMENTED: ICD-10-PCS | Mod: CPTII,S$GLB,, | Performed by: STUDENT IN AN ORGANIZED HEALTH CARE EDUCATION/TRAINING PROGRAM

## 2019-09-24 NOTE — Clinical Note
Please schedule a PET CT in 3 monthsFollow up with labs- CBC, CMP, LDH, Uric acid, Free LT chains, Immunoglobulins, SPEP, MIRNA in 3 months

## 2019-09-24 NOTE — PROGRESS NOTES
solitray plasmacytoma of vertebrae sp xrt   Pet feb neg; bcs dhaval normal; tdoays pending  rtc 3 months with bcs nad pet scan ; -educated on symptoms for which to seek attn in our clinic earlier

## 2019-09-24 NOTE — PROGRESS NOTES
"PATIENT: Mary Blair  MRN: [id number]  DATE: 2019      Diagnosis:   1. Plasmacytoma    2. Back pain, unspecified back location, unspecified back pain laterality, unspecified chronicity    3. History of radiation exposure to spine    4. Bilateral carpal tunnel syndrome    5. Cervical myelopathy    6. Dysfunction of left rotator cuff        Chief Complaint: No chief complaint on file.    Mary Blair is a 53M with PMHx of plasmacytoma of the lumbar spine previously treated at [facility] with plasmacytoma presented to our hematology clinic to establish care.     She initially presented with back pain in early 2018 and underwent bone scan on 2018 which revealed Increased uptake within L3 body corresponding to lytic lesion seen on 18 CT and underwent back surgery on 2018. A bone marrow biopsy done on 3/8/2018 was negative for any malignancy. However the biopsy of L3 vertebral body lesion revealed lambda restricted plasma cell neoplasm and skeletal survey done on 3/21/2018 revealed "Periprosthetic lucency associated with the left proximal femur potentially reflecting osteolysis rather than malignancy". She underwent ratiation threapy to L3, L4 and L5 of spine from March to May 8, 2018 for a total of 8 weeks due to suspicion for invasion of adjacent vertebrae.      2018 xray of L-spine revealed no suspicious osseous lesions and intact posterior spinal fusion L2-L4.  2018 - MIRNA + UPEP- Normal immunofixation pattern  2018 - CT abdo/pelvis- NO suspicious osseous lesions identified  2018 - CT Thoracic Spine W/WO - No suspicious osseous lesions identified.    2018 - B2 microglobulin:  1.8 mg/L (ref 0.6-2.4). CBC, CMP unremarkable   - Immunoglobulins:                          Ig      Ref 700-1600                          IgM:     103      Ref                           IgA:      81.4     Ref                  - LDH: 178 ()              - " SPEP- Total protein normal.  Apparent normal immunofixation pattern              - Kappa/Joaquín                          Kappa: 8.5                          Lambda: 10.6                          K:L ratio: 0.8              - CRP <0.3              - ESR 20     18 - MRI C-spine WO - No focal lesions observed  18 - MRI L-Spine W/WO - No focal lesions observed  18 - MRI brain W/Wo- Normal study  10/24/2018 -  Normal immunoglobulins, LDH. Normal SPEP and MIRNA. No M spike seen.              - Kappa: 7.6              - Lambda: 10.6              - K:L ratio: 0.72     She c/o chronic back pain, denies any new complaints.      She has PMHx of L hip replacements in  and , B/L carpal tunnel s/p surgery, R rotator cuff tear and trigger finger. She is a non smoker and drinks alcohol socially     She has family history of skin cancer and lung in grand mother     Follow up 2019  Recent hospitalization 9/3/2019  management of E coli bacteremia and pyelonephritis with urine culture also growing E coli.  She was treated with IV ceftriaxone with good clinical response and clearance of her blood cultures and completed 2 weeks of ciprofloxacin.  - C/o chronic back pain controlled with cymbalta  - Currently on cymbalta for fibromyalgia  - Her recent myeloma labs including SPEP, Immunoglobulins, free LT chains, MIRNA are normal  - 2019- No FDG PET/CT findings to suggest recurrent or metastatic disease  - Mammogram and PAP smear are normal    Subjective:    Initial History: Ms. Blair is a 54 y.o. female who returns for follow up.     Past Medical History:   Past Medical History:   Diagnosis Date    Cancer     Plasmacytoma     Rotator cuff disorder     Spinal stenosis        Past Surgical HIstory:   Past Surgical History:   Procedure Laterality Date     SECTION      corpal tunnel      JOINT REPLACEMENT      lubar sugery      ROTATOR CUFF REPAIR      TONSILLECTOMY      TOTAL HIP ARTHROPLASTY          Family History:   Family History   Problem Relation Age of Onset    Heart disease Mother     Heart disease Father        Social History:  reports that she has never smoked. She has never used smokeless tobacco. She reports that she does not use drugs.    Allergies:  Review of patient's allergies indicates:  No Known Allergies    Medications:  Current Outpatient Medications   Medication Sig Dispense Refill    acetaminophen (TYLENOL) 650 MG TbSR Take 650 mg by mouth every 8 (eight) hours.      conj estrogens-bazedoxifene (DUAVEE) 0.45-20 mg Tab Take 1 tablet by mouth once daily.       DULoxetine (CYMBALTA) 60 MG capsule Take 60 mg by mouth once daily.       ondansetron (ZOFRAN-ODT) 8 MG TbDL Take 1 tablet (8 mg total) by mouth every 8 (eight) hours as needed (Nausea). 24 tablet 0     No current facility-administered medications for this visit.        Review of Systems   Constitutional: Negative for chills, fever and unexpected weight change.   HENT: Negative for nosebleeds and trouble swallowing.    Gastrointestinal: Positive for abdominal pain. Negative for blood in stool, diarrhea, nausea and vomiting.   Genitourinary: Negative for flank pain and hematuria.       ECOG Performance Status: 0   Objective:      Vitals: There were no vitals filed for this visit.  BMI: There is no height or weight on file to calculate BMI.    Physical Exam   Constitutional: She is oriented to person, place, and time. She appears well-developed and well-nourished.   HENT:   Head: Normocephalic and atraumatic.   Mouth/Throat: No oropharyngeal exudate.   Eyes: Pupils are equal, round, and reactive to light. EOM are normal. No scleral icterus.   Neck: Normal range of motion. Neck supple.   Cardiovascular: Normal rate, regular rhythm and normal heart sounds.   Pulmonary/Chest: Effort normal and breath sounds normal. No respiratory distress.   Abdominal: Soft. Bowel sounds are normal. She exhibits no distension. There is tenderness.    Mild tenderness in right flank on deep palpation   Musculoskeletal: Normal range of motion. She exhibits edema. She exhibits no tenderness or deformity.   Lymphadenopathy:     She has no cervical adenopathy.   Neurological: She is alert and oriented to person, place, and time. No cranial nerve deficit.   Skin: Skin is warm and dry. Capillary refill takes less than 2 seconds. No rash noted. No pallor.   Psychiatric: She has a normal mood and affect. Her behavior is normal.       Laboratory Data:  No visits with results within 1 Week(s) from this visit.   Latest known visit with results is:   Admission on 08/30/2019, Discharged on 09/02/2019   Component Date Value Ref Range Status    Specimen UA 08/30/2019 Urine, Clean Catch   Final    Color, UA 08/30/2019 Yellow  Yellow, Straw, Aster Final    Appearance, UA 08/30/2019 Cloudy* Clear Final    pH, UA 08/30/2019 7.0  5.0 - 8.0 Final    Specific Gravity, UA 08/30/2019 1.015  1.005 - 1.030 Final    Protein, UA 08/30/2019 2+* Negative Final    Comment: Recommend a 24 hour urine protein or a urine   protein/creatinine ratio if globulin induced proteinuria is  clinically suspected.      Glucose, UA 08/30/2019 Negative  Negative Final    Ketones, UA 08/30/2019 1+* Negative Final    Bilirubin (UA) 08/30/2019 Negative  Negative Final    Occult Blood UA 08/30/2019 1+* Negative Final    Nitrite, UA 08/30/2019 Negative  Negative Final    Leukocytes, UA 08/30/2019 3+* Negative Final    WBC 08/30/2019 14.27* 3.90 - 12.70 K/uL Final    RBC 08/30/2019 4.07  4.00 - 5.40 M/uL Final    Hemoglobin 08/30/2019 12.7  12.0 - 16.0 g/dL Final    Hematocrit 08/30/2019 38.3  37.0 - 48.5 % Final    Mean Corpuscular Volume 08/30/2019 94  82 - 98 fL Final    Mean Corpuscular Hemoglobin 08/30/2019 31.2* 27.0 - 31.0 pg Final    Mean Corpuscular Hemoglobin Conc 08/30/2019 33.2  32.0 - 36.0 g/dL Final    RDW 08/30/2019 13.2  11.5 - 14.5 % Final    Platelets 08/30/2019 203  150 -  350 K/uL Final    MPV 08/30/2019 10.3  9.2 - 12.9 fL Final    Immature Granulocytes 08/30/2019 0.5  0.0 - 0.5 % Final    Gran # (ANC) 08/30/2019 11.9* 1.8 - 7.7 K/uL Final    Immature Grans (Abs) 08/30/2019 0.07* 0.00 - 0.04 K/uL Final    Comment: Mild elevation in immature granulocytes is non specific and   can be seen in a variety of conditions including stress response,   acute inflammation, trauma and pregnancy. Correlation with other   laboratory and clinical findings is essential.      Lymph # 08/30/2019 0.7* 1.0 - 4.8 K/uL Final    Mono # 08/30/2019 1.6* 0.3 - 1.0 K/uL Final    Eos # 08/30/2019 0.0  0.0 - 0.5 K/uL Final    Baso # 08/30/2019 0.04  0.00 - 0.20 K/uL Final    nRBC 08/30/2019 0  0 /100 WBC Final    Gran% 08/30/2019 83.6* 38.0 - 73.0 % Final    Lymph% 08/30/2019 4.7* 18.0 - 48.0 % Final    Mono% 08/30/2019 10.9  4.0 - 15.0 % Final    Eosinophil% 08/30/2019 0.0  0.0 - 8.0 % Final    Basophil% 08/30/2019 0.3  0.0 - 1.9 % Final    Differential Method 08/30/2019 Automated   Final    Sodium 08/30/2019 138  136 - 145 mmol/L Final    Potassium 08/30/2019 3.7  3.5 - 5.1 mmol/L Final    Chloride 08/30/2019 104  95 - 110 mmol/L Final    CO2 08/30/2019 24  23 - 29 mmol/L Final    Glucose 08/30/2019 118* 70 - 110 mg/dL Final    BUN, Bld 08/30/2019 12  6 - 20 mg/dL Final    Creatinine 08/30/2019 1.0  0.5 - 1.4 mg/dL Final    Calcium 08/30/2019 9.7  8.7 - 10.5 mg/dL Final    Anion Gap 08/30/2019 10  8 - 16 mmol/L Final    eGFR if African American 08/30/2019 >60.0  >60 mL/min/1.73 m^2 Final    eGFR if non African American 08/30/2019 >60.0  >60 mL/min/1.73 m^2 Final    Comment: Calculation used to obtain the estimated glomerular filtration  rate (eGFR) is the CKD-EPI equation.       Lactate (Lactic Acid) 08/30/2019 0.7  0.5 - 2.2 mmol/L Final    Comment: Falsely low lactic acid results can be found in samples   containing >=13.0 mg/dL total bilirubin and/or >=3.5 mg/dL   direct  bilirubin.      RBC, UA 08/30/2019 19* 0 - 4 /hpf Final    WBC, UA 08/30/2019 >100* 0 - 5 /hpf Final    Bacteria 08/30/2019 Many* None-Occ /hpf Final    Squam Epithel, UA 08/30/2019 1  /hpf Final    Hyaline Casts, UA 08/30/2019 0  0-1/lpf /lpf Final    Amorphous, UA 08/30/2019 Many* None-Moderate Final    Microscopic Comment 08/30/2019 SEE COMMENT   Final    Comment: Other formed elements not mentioned in the report are not   present in the microscopic examination.       Urine Culture, Routine 08/30/2019 *  Final                    Value:ESCHERICHIA COLI  >100,000 cfu/ml      Blood Culture, Routine 08/30/2019 Gram stain aer bottle: Gram negative rods    Final    Blood Culture, Routine 08/30/2019 Results called to and read back by: Avi Smyth RN 08/31/2019  05:15   Final    Blood Culture, Routine 08/30/2019 ESCHERICHIA COLI*  Final    Blood Culture, Routine 08/30/2019 No growth after 5 days.   Final    Procalcitonin 08/30/2019 0.83* <0.25 ng/mL Final    Comment: A concentration < 0.25 ng/mL represents a low risk bacterial   infection.  Procalcitonin may not be accurate among patients with localized   infection, recent trauma or major surgery, immunosuppressed state,   invasive fungal infection, renal dysfunction. Decisions regarding   initiation or continuation of antibiotic therapy should not be based   solely on procalcitonin levels.      Procalcitonin 08/30/2019 0.94* <0.25 ng/mL Final    Comment: A concentration < 0.25 ng/mL represents a low risk bacterial   infection.  Procalcitonin may not be accurate among patients with localized   infection, recent trauma or major surgery, immunosuppressed state,   invasive fungal infection, renal dysfunction. Decisions regarding   initiation or continuation of antibiotic therapy should not be based   solely on procalcitonin levels.      Gram Stain Result 08/31/2019 Rare WBC's   Final    Gram Stain Result 08/31/2019 No organisms seen   Final    Sodium  08/31/2019 143  136 - 145 mmol/L Final    Potassium 08/31/2019 4.9  3.5 - 5.1 mmol/L Final    Chloride 08/31/2019 110  95 - 110 mmol/L Final    CO2 08/31/2019 26  23 - 29 mmol/L Final    Glucose 08/31/2019 110  70 - 110 mg/dL Final    BUN, Bld 08/31/2019 8  6 - 20 mg/dL Final    Creatinine 08/31/2019 0.9  0.5 - 1.4 mg/dL Final    Calcium 08/31/2019 8.6* 8.7 - 10.5 mg/dL Final    Total Protein 08/31/2019 6.3  6.0 - 8.4 g/dL Final    Albumin 08/31/2019 3.1* 3.5 - 5.2 g/dL Final    Total Bilirubin 08/31/2019 0.4  0.1 - 1.0 mg/dL Final    Comment: For infants and newborns, interpretation of results should be based  on gestational age, weight and in agreement with clinical  observations.  Premature Infant recommended reference ranges:  Up to 24 hours.............<8.0 mg/dL  Up to 48 hours............<12.0 mg/dL  3-5 days..................<15.0 mg/dL  6-29 days.................<15.0 mg/dL      Alkaline Phosphatase 08/31/2019 87  55 - 135 U/L Final    AST 08/31/2019 18  10 - 40 U/L Final    ALT 08/31/2019 18  10 - 44 U/L Final    Anion Gap 08/31/2019 7* 8 - 16 mmol/L Final    eGFR if African American 08/31/2019 >60.0  >60 mL/min/1.73 m^2 Final    eGFR if non African American 08/31/2019 >60.0  >60 mL/min/1.73 m^2 Final    Comment: Calculation used to obtain the estimated glomerular filtration  rate (eGFR) is the CKD-EPI equation.       WBC 08/31/2019 11.23  3.90 - 12.70 K/uL Final    RBC 08/31/2019 3.64* 4.00 - 5.40 M/uL Final    Hemoglobin 08/31/2019 11.2* 12.0 - 16.0 g/dL Final    Hematocrit 08/31/2019 35.4* 37.0 - 48.5 % Final    Mean Corpuscular Volume 08/31/2019 97  82 - 98 fL Final    Mean Corpuscular Hemoglobin 08/31/2019 30.8  27.0 - 31.0 pg Final    Mean Corpuscular Hemoglobin Conc 08/31/2019 31.6* 32.0 - 36.0 g/dL Final    RDW 08/31/2019 13.3  11.5 - 14.5 % Final    Platelets 08/31/2019 169  150 - 350 K/uL Final    MPV 08/31/2019 10.9  9.2 - 12.9 fL Final    Immature Granulocytes  08/31/2019 0.5  0.0 - 0.5 % Final    Gran # (ANC) 08/31/2019 8.9* 1.8 - 7.7 K/uL Final    Immature Grans (Abs) 08/31/2019 0.06* 0.00 - 0.04 K/uL Final    Comment: Mild elevation in immature granulocytes is non specific and   can be seen in a variety of conditions including stress response,   acute inflammation, trauma and pregnancy. Correlation with other   laboratory and clinical findings is essential.      Lymph # 08/31/2019 1.1  1.0 - 4.8 K/uL Final    Mono # 08/31/2019 1.1* 0.3 - 1.0 K/uL Final    Eos # 08/31/2019 0.0  0.0 - 0.5 K/uL Final    Baso # 08/31/2019 0.02  0.00 - 0.20 K/uL Final    nRBC 08/31/2019 0  0 /100 WBC Final    Gran% 08/31/2019 79.6* 38.0 - 73.0 % Final    Lymph% 08/31/2019 10.1* 18.0 - 48.0 % Final    Mono% 08/31/2019 9.3  4.0 - 15.0 % Final    Eosinophil% 08/31/2019 0.3  0.0 - 8.0 % Final    Basophil% 08/31/2019 0.2  0.0 - 1.9 % Final    Differential Method 08/31/2019 Automated   Final    Blood Culture, Routine 08/31/2019 No growth after 5 days.   Final    Blood Culture, Routine 08/31/2019 No growth after 5 days.   Final    Magnesium 08/31/2019 1.9  1.6 - 2.6 mg/dL Final    Sodium 09/01/2019 139  136 - 145 mmol/L Final    Potassium 09/01/2019 3.6  3.5 - 5.1 mmol/L Final    Chloride 09/01/2019 105  95 - 110 mmol/L Final    CO2 09/01/2019 25  23 - 29 mmol/L Final    Glucose 09/01/2019 114* 70 - 110 mg/dL Final    BUN, Bld 09/01/2019 6  6 - 20 mg/dL Final    Creatinine 09/01/2019 0.8  0.5 - 1.4 mg/dL Final    Calcium 09/01/2019 8.9  8.7 - 10.5 mg/dL Final    Total Protein 09/01/2019 6.3  6.0 - 8.4 g/dL Final    Albumin 09/01/2019 2.9* 3.5 - 5.2 g/dL Final    Total Bilirubin 09/01/2019 0.3  0.1 - 1.0 mg/dL Final    Comment: For infants and newborns, interpretation of results should be based  on gestational age, weight and in agreement with clinical  observations.  Premature Infant recommended reference ranges:  Up to 24 hours.............<8.0 mg/dL  Up to 48  hours............<12.0 mg/dL  3-5 days..................<15.0 mg/dL  6-29 days.................<15.0 mg/dL      Alkaline Phosphatase 09/01/2019 77  55 - 135 U/L Final    AST 09/01/2019 13  10 - 40 U/L Final    ALT 09/01/2019 15  10 - 44 U/L Final    Anion Gap 09/01/2019 9  8 - 16 mmol/L Final    eGFR if African American 09/01/2019 >60.0  >60 mL/min/1.73 m^2 Final    eGFR if non African American 09/01/2019 >60.0  >60 mL/min/1.73 m^2 Final    Comment: Calculation used to obtain the estimated glomerular filtration  rate (eGFR) is the CKD-EPI equation.       WBC 09/01/2019 7.74  3.90 - 12.70 K/uL Final    RBC 09/01/2019 3.54* 4.00 - 5.40 M/uL Final    Hemoglobin 09/01/2019 10.7* 12.0 - 16.0 g/dL Final    Hematocrit 09/01/2019 33.5* 37.0 - 48.5 % Final    Mean Corpuscular Volume 09/01/2019 95  82 - 98 fL Final    Mean Corpuscular Hemoglobin 09/01/2019 30.2  27.0 - 31.0 pg Final    Mean Corpuscular Hemoglobin Conc 09/01/2019 31.9* 32.0 - 36.0 g/dL Final    RDW 09/01/2019 13.1  11.5 - 14.5 % Final    Platelets 09/01/2019 161  150 - 350 K/uL Final    MPV 09/01/2019 10.6  9.2 - 12.9 fL Final    Immature Granulocytes 09/01/2019 0.4  0.0 - 0.5 % Final    Gran # (ANC) 09/01/2019 6.3  1.8 - 7.7 K/uL Final    Immature Grans (Abs) 09/01/2019 0.03  0.00 - 0.04 K/uL Final    Comment: Mild elevation in immature granulocytes is non specific and   can be seen in a variety of conditions including stress response,   acute inflammation, trauma and pregnancy. Correlation with other   laboratory and clinical findings is essential.      Lymph # 09/01/2019 0.5* 1.0 - 4.8 K/uL Final    Mono # 09/01/2019 0.9  0.3 - 1.0 K/uL Final    Eos # 09/01/2019 0.0  0.0 - 0.5 K/uL Final    Baso # 09/01/2019 0.02  0.00 - 0.20 K/uL Final    nRBC 09/01/2019 0  0 /100 WBC Final    Gran% 09/01/2019 80.7* 38.0 - 73.0 % Final    Lymph% 09/01/2019 6.6* 18.0 - 48.0 % Final    Mono% 09/01/2019 11.5  4.0 - 15.0 % Final    Eosinophil%  09/01/2019 0.5  0.0 - 8.0 % Final    Basophil% 09/01/2019 0.3  0.0 - 1.9 % Final    Differential Method 09/01/2019 Automated   Final    POCT Glucose 09/01/2019 108  70 - 110 mg/dL Final    Magnesium 09/01/2019 2.1  1.6 - 2.6 mg/dL Final    Sodium 09/02/2019 139  136 - 145 mmol/L Final    Potassium 09/02/2019 3.7  3.5 - 5.1 mmol/L Final    Chloride 09/02/2019 103  95 - 110 mmol/L Final    CO2 09/02/2019 27  23 - 29 mmol/L Final    Glucose 09/02/2019 100  70 - 110 mg/dL Final    BUN, Bld 09/02/2019 9  6 - 20 mg/dL Final    Creatinine 09/02/2019 1.0  0.5 - 1.4 mg/dL Final    Calcium 09/02/2019 9.0  8.7 - 10.5 mg/dL Final    Total Protein 09/02/2019 6.4  6.0 - 8.4 g/dL Final    Albumin 09/02/2019 2.9* 3.5 - 5.2 g/dL Final    Total Bilirubin 09/02/2019 0.2  0.1 - 1.0 mg/dL Final    Comment: For infants and newborns, interpretation of results should be based  on gestational age, weight and in agreement with clinical  observations.  Premature Infant recommended reference ranges:  Up to 24 hours.............<8.0 mg/dL  Up to 48 hours............<12.0 mg/dL  3-5 days..................<15.0 mg/dL  6-29 days.................<15.0 mg/dL      Alkaline Phosphatase 09/02/2019 83  55 - 135 U/L Final    AST 09/02/2019 16  10 - 40 U/L Final    ALT 09/02/2019 16  10 - 44 U/L Final    Anion Gap 09/02/2019 9  8 - 16 mmol/L Final    eGFR if African American 09/02/2019 >60.0  >60 mL/min/1.73 m^2 Final    eGFR if non African American 09/02/2019 >60.0  >60 mL/min/1.73 m^2 Final    Comment: Calculation used to obtain the estimated glomerular filtration  rate (eGFR) is the CKD-EPI equation.       WBC 09/02/2019 3.87* 3.90 - 12.70 K/uL Final    RBC 09/02/2019 3.48* 4.00 - 5.40 M/uL Final    Hemoglobin 09/02/2019 10.5* 12.0 - 16.0 g/dL Final    Hematocrit 09/02/2019 33.6* 37.0 - 48.5 % Final    Mean Corpuscular Volume 09/02/2019 97  82 - 98 fL Final    Mean Corpuscular Hemoglobin 09/02/2019 30.2  27.0 - 31.0 pg Final     Mean Corpuscular Hemoglobin Conc 09/02/2019 31.3* 32.0 - 36.0 g/dL Final    RDW 09/02/2019 12.7  11.5 - 14.5 % Final    Platelets 09/02/2019 175  150 - 350 K/uL Final    MPV 09/02/2019 10.2  9.2 - 12.9 fL Final    Immature Granulocytes 09/02/2019 0.3  0.0 - 0.5 % Final    Gran # (ANC) 09/02/2019 2.4  1.8 - 7.7 K/uL Final    Immature Grans (Abs) 09/02/2019 0.01  0.00 - 0.04 K/uL Final    Comment: Mild elevation in immature granulocytes is non specific and   can be seen in a variety of conditions including stress response,   acute inflammation, trauma and pregnancy. Correlation with other   laboratory and clinical findings is essential.      Lymph # 09/02/2019 0.7* 1.0 - 4.8 K/uL Final    Mono # 09/02/2019 0.6  0.3 - 1.0 K/uL Final    Eos # 09/02/2019 0.1  0.0 - 0.5 K/uL Final    Baso # 09/02/2019 0.03  0.00 - 0.20 K/uL Final    nRBC 09/02/2019 0  0 /100 WBC Final    Gran% 09/02/2019 62.7  38.0 - 73.0 % Final    Lymph% 09/02/2019 18.3  18.0 - 48.0 % Final    Mono% 09/02/2019 15.8* 4.0 - 15.0 % Final    Eosinophil% 09/02/2019 2.1  0.0 - 8.0 % Final    Basophil% 09/02/2019 0.8  0.0 - 1.9 % Final    Differential Method 09/02/2019 Automated   Final         Imaging:    Assessment:       1. Plasmacytoma    2. Back pain, unspecified back location, unspecified back pain laterality, unspecified chronicity    3. History of radiation exposure to spine    4. Bilateral carpal tunnel syndrome    5. Cervical myelopathy    6. 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  We will schedule for a follow up PET-CT in 3 months  2.  Counseled and educated on symptoms she would need to seek attention and call our  clinic, patient agreeable    Will follow up in 3 months with labs- CBC, CMP, myeloma labs, LDH, uric acid    Addendum:  Myeloma labs done on 09/24/2019 are normal     Plan of care discussed with Dr. Lourdes Dunlap MD PGY-5  Hematology and Oncology  Pager:[phone]

## 2019-09-25 LAB
ALBUMIN SERPL ELPH-MCNC: 4.21 G/DL (ref 3.35–5.55)
ALPHA1 GLOB SERPL ELPH-MCNC: 0.36 G/DL (ref 0.17–0.41)
ALPHA2 GLOB SERPL ELPH-MCNC: 0.73 G/DL (ref 0.43–0.99)
B-GLOBULIN SERPL ELPH-MCNC: 0.71 G/DL (ref 0.5–1.1)
GAMMA GLOB SERPL ELPH-MCNC: 0.79 G/DL (ref 0.67–1.58)
INTERPRETATION SERPL IFE-IMP: NORMAL
KAPPA LC SER QL IA: 0.94 MG/DL (ref 0.33–1.94)
KAPPA LC/LAMBDA SER IA: 0.83 (ref 0.26–1.65)
LAMBDA LC SER QL IA: 1.13 MG/DL (ref 0.57–2.63)
PATHOLOGIST INTERPRETATION IFE: NORMAL
PATHOLOGIST INTERPRETATION SPE: NORMAL
PROT SERPL-MCNC: 6.8 G/DL (ref 6–8.4)

## 2019-12-20 ENCOUNTER — HOSPITAL ENCOUNTER (OUTPATIENT)
Dept: RADIOLOGY | Facility: HOSPITAL | Age: 54
Discharge: HOME | End: 2019-12-20
Attending: STUDENT IN AN ORGANIZED HEALTH CARE EDUCATION/TRAINING PROGRAM
Payer: COMMERCIAL

## 2019-12-20 DIAGNOSIS — C90.30 PLASMACYTOMA: ICD-10-CM

## 2019-12-20 DIAGNOSIS — Z92.3 HISTORY OF RADIATION EXPOSURE TO SPINE: ICD-10-CM

## 2019-12-20 DIAGNOSIS — M54.9 BACK PAIN, UNSPECIFIED BACK LOCATION, UNSPECIFIED BACK PAIN LATERALITY, UNSPECIFIED CHRONICITY: ICD-10-CM

## 2019-12-20 LAB — POCT GLUCOSE: 95 MG/DL (ref 70–110)

## 2019-12-20 PROCEDURE — 78816 PET IMAGE W/CT FULL BODY: CPT | Mod: 26,PS,, | Performed by: RADIOLOGY

## 2019-12-20 PROCEDURE — 78816 PET IMAGE W/CT FULL BODY: CPT | Mod: TC

## 2019-12-20 PROCEDURE — 78816 NM PET CT WHOLE BODY: ICD-10-PCS | Mod: 26,PS,, | Performed by: RADIOLOGY

## 2020-01-07 ENCOUNTER — LAB VISIT (OUTPATIENT)
Dept: LAB | Facility: HOSPITAL | Age: 55
End: 2020-01-07
Payer: COMMERCIAL

## 2020-01-07 ENCOUNTER — OFFICE VISIT (OUTPATIENT)
Dept: HEMATOLOGY/ONCOLOGY | Facility: CLINIC | Age: 55
End: 2020-01-07
Payer: COMMERCIAL

## 2020-01-07 VITALS
HEIGHT: 62 IN | TEMPERATURE: 99 F | HEART RATE: 69 BPM | WEIGHT: 185.19 LBS | OXYGEN SATURATION: 96 % | SYSTOLIC BLOOD PRESSURE: 126 MMHG | RESPIRATION RATE: 18 BRPM | DIASTOLIC BLOOD PRESSURE: 76 MMHG | BODY MASS INDEX: 34.08 KG/M2

## 2020-01-07 DIAGNOSIS — Z92.3 HISTORY OF RADIATION EXPOSURE TO SPINE: ICD-10-CM

## 2020-01-07 DIAGNOSIS — M54.9 BACK PAIN, UNSPECIFIED BACK LOCATION, UNSPECIFIED BACK PAIN LATERALITY, UNSPECIFIED CHRONICITY: ICD-10-CM

## 2020-01-07 DIAGNOSIS — C90.31 SOLITARY PLASMACYTOMA IN REMISSION: Primary | ICD-10-CM

## 2020-01-07 DIAGNOSIS — C90.30 PLASMACYTOMA: ICD-10-CM

## 2020-01-07 DIAGNOSIS — G95.9 CERVICAL MYELOPATHY: ICD-10-CM

## 2020-01-07 DIAGNOSIS — G56.03 BILATERAL CARPAL TUNNEL SYNDROME: ICD-10-CM

## 2020-01-07 LAB
ALBUMIN SERPL BCP-MCNC: 3.6 G/DL (ref 3.5–5.2)
ALP SERPL-CCNC: 74 U/L (ref 55–135)
ALT SERPL W/O P-5'-P-CCNC: 19 U/L (ref 10–44)
ANION GAP SERPL CALC-SCNC: 7 MMOL/L (ref 8–16)
AST SERPL-CCNC: 17 U/L (ref 10–40)
B2 MICROGLOB SERPL-MCNC: 1.7 UG/ML (ref 0–2.5)
BASOPHILS # BLD AUTO: 0.02 K/UL (ref 0–0.2)
BASOPHILS NFR BLD: 0.4 % (ref 0–1.9)
BILIRUB SERPL-MCNC: 0.2 MG/DL (ref 0.1–1)
BUN SERPL-MCNC: 10 MG/DL (ref 6–20)
CALCIUM SERPL-MCNC: 9.1 MG/DL (ref 8.7–10.5)
CHLORIDE SERPL-SCNC: 111 MMOL/L (ref 95–110)
CO2 SERPL-SCNC: 25 MMOL/L (ref 23–29)
CREAT SERPL-MCNC: 0.8 MG/DL (ref 0.5–1.4)
DIFFERENTIAL METHOD: ABNORMAL
EOSINOPHIL # BLD AUTO: 0.1 K/UL (ref 0–0.5)
EOSINOPHIL NFR BLD: 2.2 % (ref 0–8)
ERYTHROCYTE [DISTWIDTH] IN BLOOD BY AUTOMATED COUNT: 13.3 % (ref 11.5–14.5)
EST. GFR  (AFRICAN AMERICAN): >60 ML/MIN/1.73 M^2
EST. GFR  (NON AFRICAN AMERICAN): >60 ML/MIN/1.73 M^2
GLUCOSE SERPL-MCNC: 100 MG/DL (ref 70–110)
HCT VFR BLD AUTO: 39 % (ref 37–48.5)
HGB BLD-MCNC: 12.3 G/DL (ref 12–16)
IGA SERPL-MCNC: 68 MG/DL (ref 40–350)
IGG SERPL-MCNC: 839 MG/DL (ref 650–1600)
IGM SERPL-MCNC: 100 MG/DL (ref 50–300)
IMM GRANULOCYTES # BLD AUTO: 0.01 K/UL (ref 0–0.04)
IMM GRANULOCYTES NFR BLD AUTO: 0.2 % (ref 0–0.5)
LDH SERPL L TO P-CCNC: 182 U/L (ref 110–260)
LYMPHOCYTES # BLD AUTO: 1.1 K/UL (ref 1–4.8)
LYMPHOCYTES NFR BLD: 23.7 % (ref 18–48)
MCH RBC QN AUTO: 30 PG (ref 27–31)
MCHC RBC AUTO-ENTMCNC: 31.5 G/DL (ref 32–36)
MCV RBC AUTO: 95 FL (ref 82–98)
MONOCYTES # BLD AUTO: 0.4 K/UL (ref 0.3–1)
MONOCYTES NFR BLD: 9.8 % (ref 4–15)
NEUTROPHILS # BLD AUTO: 2.8 K/UL (ref 1.8–7.7)
NEUTROPHILS NFR BLD: 63.7 % (ref 38–73)
NRBC BLD-RTO: 0 /100 WBC
PLATELET # BLD AUTO: 244 K/UL (ref 150–350)
PMV BLD AUTO: 10.2 FL (ref 9.2–12.9)
POTASSIUM SERPL-SCNC: 4 MMOL/L (ref 3.5–5.1)
PROT SERPL-MCNC: 6.8 G/DL (ref 6–8.4)
RBC # BLD AUTO: 4.1 M/UL (ref 4–5.4)
SODIUM SERPL-SCNC: 143 MMOL/L (ref 136–145)
WBC # BLD AUTO: 4.47 K/UL (ref 3.9–12.7)

## 2020-01-07 PROCEDURE — 99999 PR PBB SHADOW E&M-EST. PATIENT-LVL III: CPT | Mod: PBBFAC,,, | Performed by: STUDENT IN AN ORGANIZED HEALTH CARE EDUCATION/TRAINING PROGRAM

## 2020-01-07 PROCEDURE — 82784 ASSAY IGA/IGD/IGG/IGM EACH: CPT | Mod: 59

## 2020-01-07 PROCEDURE — 84165 PATHOLOGIST INTERPRETATION SPE: ICD-10-PCS | Mod: 26,,, | Performed by: PATHOLOGY

## 2020-01-07 PROCEDURE — 86334 PATHOLOGIST INTERPRETATION IFE: ICD-10-PCS | Mod: 26,,, | Performed by: PATHOLOGY

## 2020-01-07 PROCEDURE — 86334 IMMUNOFIX E-PHORESIS SERUM: CPT

## 2020-01-07 PROCEDURE — 83615 LACTATE (LD) (LDH) ENZYME: CPT

## 2020-01-07 PROCEDURE — 84165 PROTEIN E-PHORESIS SERUM: CPT

## 2020-01-07 PROCEDURE — 99215 OFFICE O/P EST HI 40 MIN: CPT | Mod: S$GLB,,, | Performed by: STUDENT IN AN ORGANIZED HEALTH CARE EDUCATION/TRAINING PROGRAM

## 2020-01-07 PROCEDURE — 80053 COMPREHEN METABOLIC PANEL: CPT

## 2020-01-07 PROCEDURE — 85025 COMPLETE CBC W/AUTO DIFF WBC: CPT

## 2020-01-07 PROCEDURE — 82232 ASSAY OF BETA-2 PROTEIN: CPT

## 2020-01-07 PROCEDURE — 84165 PROTEIN E-PHORESIS SERUM: CPT | Mod: 26,,, | Performed by: PATHOLOGY

## 2020-01-07 PROCEDURE — 99999 PR PBB SHADOW E&M-EST. PATIENT-LVL III: ICD-10-PCS | Mod: PBBFAC,,, | Performed by: STUDENT IN AN ORGANIZED HEALTH CARE EDUCATION/TRAINING PROGRAM

## 2020-01-07 PROCEDURE — 3008F BODY MASS INDEX DOCD: CPT | Mod: CPTII,S$GLB,, | Performed by: STUDENT IN AN ORGANIZED HEALTH CARE EDUCATION/TRAINING PROGRAM

## 2020-01-07 PROCEDURE — 84550 ASSAY OF BLOOD/URIC ACID: CPT

## 2020-01-07 PROCEDURE — 99215 PR OFFICE/OUTPT VISIT, EST, LEVL V, 40-54 MIN: ICD-10-PCS | Mod: S$GLB,,, | Performed by: STUDENT IN AN ORGANIZED HEALTH CARE EDUCATION/TRAINING PROGRAM

## 2020-01-07 PROCEDURE — 83520 IMMUNOASSAY QUANT NOS NONAB: CPT | Mod: 59

## 2020-01-07 PROCEDURE — 36415 COLL VENOUS BLD VENIPUNCTURE: CPT

## 2020-01-07 PROCEDURE — 86334 IMMUNOFIX E-PHORESIS SERUM: CPT | Mod: 26,,, | Performed by: PATHOLOGY

## 2020-01-07 PROCEDURE — 3008F PR BODY MASS INDEX (BMI) DOCUMENTED: ICD-10-PCS | Mod: CPTII,S$GLB,, | Performed by: STUDENT IN AN ORGANIZED HEALTH CARE EDUCATION/TRAINING PROGRAM

## 2020-01-07 RX ORDER — PANTOPRAZOLE SODIUM 40 MG/1
TABLET, DELAYED RELEASE ORAL
COMMUNITY
Start: 2019-12-03 | End: 2021-03-05

## 2020-01-07 RX ORDER — KETOROLAC TROMETHAMINE 10 MG/1
TABLET, FILM COATED ORAL
COMMUNITY
Start: 2019-11-19 | End: 2021-03-05

## 2020-01-07 RX ORDER — TIZANIDINE 4 MG/1
TABLET ORAL
COMMUNITY
Start: 2019-10-26 | End: 2021-03-05

## 2020-01-07 NOTE — PROGRESS NOTES
"PATIENT: Mary Blair  MRN: [id number]  DATE: 1/7/2020      Diagnosis:   1. Solitary plasmacytoma in remission    2. Back pain, unspecified back location, unspecified back pain laterality, unspecified chronicity    3. History of radiation exposure to spine    4. Bilateral carpal tunnel syndrome    5. Cervical myelopathy        Chief Complaint: Fatigue    Mary Blair is a 53M with PMHx of plasmacytoma of the lumbar spine previously treated at [facility] with plasmacytoma presented to our hematology clinic to establish care.     She initially presented with back pain in early February of 2018 and underwent bone scan on 2/27/2018 which revealed Increased uptake within L3 body corresponding to lytic lesion seen on 2/16/18 CT and underwent back surgery on 2/28/2018. A bone marrow biopsy done on 3/8/2018 was negative for any malignancy. However the biopsy of L3 vertebral body lesion revealed lambda restricted plasma cell neoplasm and skeletal survey done on 3/21/2018 revealed "Periprosthetic lucency associated with the left proximal femur potentially reflecting osteolysis rather than malignancy". She underwent ratiation threapy to L3, L4 and L5 of spine from March to May 8, 2018 for a total of 8 weeks due to suspicion for invasion of adjacent vertebrae.      4/11/2018 xray of L-spine revealed no suspicious osseous lesions and intact posterior spinal fusion L2-L4.  4/13/2018 - MIRNA + UPEP- Normal immunofixation pattern  4/26/2018 - CT abdo/pelvis- NO suspicious osseous lesions identified  4/26/2018 - CT Thoracic Spine W/WO - No suspicious osseous lesions identified.    5/23/2018 - B2 microglobulin:  1.8 mg/L (ref 0.6-2.4). CBC, CMP unremarkable   - Immunoglobulins: Normal   - LDH: 178 ()   - SPEP- Total protein normal.  Apparent normal immunofixation pattern. Normal FLC ratio     8/03/18 - MRI C-spine WO - No focal lesions observed  8/17/18 - MRI L-Spine W/WO - No focal lesions observed  9/24/18 - MRI brain W/Wo- " Normal study  10/24/2018 -  Normal immunoglobulins, LDH. Normal SPEP and MIRNA. No M spike seen. Normal FLC ratio     She c/o chronic back pain, denies any new complaints.      She has PMHx of L hip replacements in  and , B/L carpal tunnel s/p surgery, R rotator cuff tear and trigger finger. She is a non smoker and drinks alcohol socially     She has family history of skin cancer and lung in grand mother     Follow up 2020  - C/o chronic back pain, neck/shoulder pain, knee pain, all over. Complains of more fatigue likely secondary to pain all over  - Currently on cymbalta for fibromyalgia  - Her recent myeloma labs including SPEP, Immunoglobulins, free LT chains, MIRNA are normal  - 2019- No FDG PET/CT findings to suggest recurrent or metastatic disease  - Mammogram and PAP smear are normal- 2019    Subjective:    Initial History: Ms. Blair is a 54 y.o. female who returns for follow up.     Past Medical History:   Past Medical History:   Diagnosis Date    Cancer     Plasmacytoma     Rotator cuff disorder     Spinal stenosis        Past Surgical HIstory:   Past Surgical History:   Procedure Laterality Date     SECTION      corpal tunnel      JOINT REPLACEMENT      lubar sugery      ROTATOR CUFF REPAIR      TONSILLECTOMY      TOTAL HIP ARTHROPLASTY         Family History:   Family History   Problem Relation Age of Onset    Heart disease Mother     Heart disease Father        Social History:  reports that she has never smoked. She has never used smokeless tobacco. She reports that she does not use drugs.    Allergies:  Review of patient's allergies indicates:  No Known Allergies    Medications:  Current Outpatient Medications   Medication Sig Dispense Refill    acetaminophen (TYLENOL) 650 MG TbSR Take 650 mg by mouth every 8 (eight) hours.      conj estrogens-bazedoxifene (DUAVEE) 0.45-20 mg Tab Take 1 tablet by mouth once daily.       DULoxetine (CYMBALTA) 60 MG capsule  "Take 60 mg by mouth once daily.       ketorolac (TORADOL) 10 mg tablet       ondansetron (ZOFRAN-ODT) 8 MG TbDL Take 1 tablet (8 mg total) by mouth every 8 (eight) hours as needed (Nausea). 24 tablet 0    pantoprazole (PROTONIX) 40 MG tablet       tiZANidine (ZANAFLEX) 4 MG tablet        No current facility-administered medications for this visit.        Review of Systems   Constitutional: Positive for fatigue. Negative for appetite change, chills, fever and unexpected weight change.   HENT: Negative for mouth sores, nosebleeds, sore throat and trouble swallowing.    Respiratory: Negative for cough and shortness of breath.    Cardiovascular: Negative for chest pain and leg swelling.   Gastrointestinal: Positive for constipation. Negative for abdominal pain, blood in stool, diarrhea, nausea and vomiting.   Genitourinary: Negative for dyspareunia, dysuria, frequency, hematuria and urgency.   Musculoskeletal: Positive for back pain and neck pain. Negative for arthralgias, gait problem and joint swelling.   Skin: Negative for pallor and rash.   Neurological: Negative for seizures, syncope and headaches.   Hematological: Negative for adenopathy. Does not bruise/bleed easily.   Psychiatric/Behavioral: Negative for agitation and behavioral problems.       ECOG Performance Status: 1   Objective:      Vitals:   Vitals:    01/07/20 1326   BP: 126/76   Pulse: 69   Resp: 18   Temp: 98.6 °F (37 °C)   SpO2: 96%   Weight: 84 kg (185 lb 3 oz)   Height: 5' 2" (1.575 m)     BMI: Body mass index is 33.87 kg/m².    Physical Exam   Constitutional: She is oriented to person, place, and time. She appears well-developed and well-nourished. No distress.   Eyes: Pupils are equal, round, and reactive to light. EOM are normal. No scleral icterus.   Neck: Normal range of motion. Neck supple.   Cardiovascular: Normal rate, regular rhythm and normal heart sounds.   Pulmonary/Chest: Effort normal and breath sounds normal. No respiratory " distress.   Abdominal: Soft. Bowel sounds are normal. She exhibits no mass. There is no tenderness.   Musculoskeletal: Normal range of motion. She exhibits no edema, tenderness or deformity.   Lymphadenopathy:     She has no cervical adenopathy.   Neurological: She is alert and oriented to person, place, and time. No cranial nerve deficit.   Skin: Skin is warm and dry. Capillary refill takes less than 2 seconds. No pallor.   Psychiatric: She has a normal mood and affect. Her behavior is normal.       Laboratory Data:  Lab Visit on 01/07/2020   Component Date Value Ref Range Status    WBC 01/07/2020 4.47  3.90 - 12.70 K/uL Final    RBC 01/07/2020 4.10  4.00 - 5.40 M/uL Final    Hemoglobin 01/07/2020 12.3  12.0 - 16.0 g/dL Final    Hematocrit 01/07/2020 39.0  37.0 - 48.5 % Final    Mean Corpuscular Volume 01/07/2020 95  82 - 98 fL Final    Mean Corpuscular Hemoglobin 01/07/2020 30.0  27.0 - 31.0 pg Final    Mean Corpuscular Hemoglobin Conc 01/07/2020 31.5* 32.0 - 36.0 g/dL Final    RDW 01/07/2020 13.3  11.5 - 14.5 % Final    Platelets 01/07/2020 244  150 - 350 K/uL Final    MPV 01/07/2020 10.2  9.2 - 12.9 fL Final    Immature Granulocytes 01/07/2020 0.2  0.0 - 0.5 % Final    Gran # (ANC) 01/07/2020 2.8  1.8 - 7.7 K/uL Final    Immature Grans (Abs) 01/07/2020 0.01  0.00 - 0.04 K/uL Final    Comment: Mild elevation in immature granulocytes is non specific and   can be seen in a variety of conditions including stress response,   acute inflammation, trauma and pregnancy. Correlation with other   laboratory and clinical findings is essential.      Lymph # 01/07/2020 1.1  1.0 - 4.8 K/uL Final    Mono # 01/07/2020 0.4  0.3 - 1.0 K/uL Final    Eos # 01/07/2020 0.1  0.0 - 0.5 K/uL Final    Baso # 01/07/2020 0.02  0.00 - 0.20 K/uL Final    nRBC 01/07/2020 0  0 /100 WBC Final    Gran% 01/07/2020 63.7  38.0 - 73.0 % Final    Lymph% 01/07/2020 23.7  18.0 - 48.0 % Final    Mono% 01/07/2020 9.8  4.0 - 15.0 %  Final    Eosinophil% 01/07/2020 2.2  0.0 - 8.0 % Final    Basophil% 01/07/2020 0.4  0.0 - 1.9 % Final    Differential Method 01/07/2020 Automated   Final    Sodium 01/07/2020 143  136 - 145 mmol/L Final    Potassium 01/07/2020 4.0  3.5 - 5.1 mmol/L Final    Chloride 01/07/2020 111* 95 - 110 mmol/L Final    CO2 01/07/2020 25  23 - 29 mmol/L Final    Glucose 01/07/2020 100  70 - 110 mg/dL Final    BUN, Bld 01/07/2020 10  6 - 20 mg/dL Final    Creatinine 01/07/2020 0.8  0.5 - 1.4 mg/dL Final    Calcium 01/07/2020 9.1  8.7 - 10.5 mg/dL Final    Total Protein 01/07/2020 6.8  6.0 - 8.4 g/dL Final    Albumin 01/07/2020 3.6  3.5 - 5.2 g/dL Final    Total Bilirubin 01/07/2020 0.2  0.1 - 1.0 mg/dL Final    Comment: For infants and newborns, interpretation of results should be based  on gestational age, weight and in agreement with clinical  observations.  Premature Infant recommended reference ranges:  Up to 24 hours.............<8.0 mg/dL  Up to 48 hours............<12.0 mg/dL  3-5 days..................<15.0 mg/dL  6-29 days.................<15.0 mg/dL      Alkaline Phosphatase 01/07/2020 74  55 - 135 U/L Final    AST 01/07/2020 17  10 - 40 U/L Final    ALT 01/07/2020 19  10 - 44 U/L Final    Anion Gap 01/07/2020 7* 8 - 16 mmol/L Final    eGFR if African American 01/07/2020 >60.0  >60 mL/min/1.73 m^2 Final    eGFR if non African American 01/07/2020 >60.0  >60 mL/min/1.73 m^2 Final    Comment: Calculation used to obtain the estimated glomerular filtration  rate (eGFR) is the CKD-EPI equation.       IgG - Serum 01/07/2020 839  650 - 1600 mg/dL Final    IgG Cord Blood Reference Range: 650-1600 mg/dL.    IgA 01/07/2020 68  40 - 350 mg/dL Final    IgA Cord Blood Reference Range: <5 mg/dL.    IgM 01/07/2020 100  50 - 300 mg/dL Final    IgM Cord Blood Reference Range: <25 mg/dL.    Protein, Serum 01/07/2020 6.6  6.0 - 8.4 g/dL Final    Comment: Serum protein electrophoresis and immunofixation results should  be   interpreted in clinical context in that some therapeutic agents can   result   in false positive results (example, daratumumab). Correlation with   the   patient s therapeutic regimen is required.      LD 01/07/2020 182  110 - 260 U/L Final    Results are increased in hemolyzed samples.    Beta-2 Microglobulin 01/07/2020 1.7  0.0 - 2.5 ug/mL Final         Imaging:       PET/CT 12/20/2019  1.  No definite evidence of active osseous disease.  In this patient with L3 plasmacytoma status post radiation therapy, there is mild nonspecific uptake about the L3 vertebroplasty cement favored to represent postoperative inflammation status post removal of lumbar fusion hardware.  Attention on follow-up recommended.    2.  No evidence of extramedullary disease.  Mild soft tissue thickening overlying the posterior paraspinous musculature at the L2-L4 level status post removal of hardware.  Mild tracer uptake within this region compatible with soft tissue infectious/noninfectious inflammation.    Assessment:       1. Solitary plasmacytoma in remission    2. Back pain, unspecified back location, unspecified back pain laterality, unspecified chronicity    3. History of radiation exposure to spine    4. Bilateral carpal tunnel syndrome    5. Cervical myelopathy         1. Solitary Plasmacytoma of vertebrae s/p definite radiation treatments to L3 to L5 in May 2018. Her recent PET/CT done on 12/20/2019 did not suggest any recurrent or metastatic disease  Her Myeloma labs have been negative so far. Previous imaging has been negative for any lytic lesions  No signs of local recurrent/systemic myeloma/other plasmacytoma  Today's SPEP/FLC/MIRNA all pending. Her CBC, CMP, LDH, Beta 2 microglobin, Immunoglobulins are normal     2. Body pain all over: c/o chronic pain in the back, shoulders, hands and legs. She was diagnosed with fibromyalgia and currently on Cymbalta with moderate improvement. She tried gabapentin and Lyrica in the past  with little help  3. Bilateral Carpal tunnel syndrome s/p surgery       Plan:     1. Continue close serial monitoring every 3 months  2. Counseled and educated on symptoms she would need to seek attention and call our clinic, patient agreeable  3. Recommend to follow up with PCP for further evaluation of her diffuse body/bone pain with thyroid studies, dexa scan for osteoporosis, etc.    Ms. Blair presented with her daughter today and are agreeable with the plan     Will follow up in 3 months with labs- CBC, CMP, myeloma labs, LDH, uric acid    Plan of care discussed with Dr. Rodrick Dunlap MD PGY-5  Hematology and Oncology  Pager:[phone]

## 2020-01-08 LAB
ALBUMIN SERPL ELPH-MCNC: 4.11 G/DL (ref 3.35–5.55)
ALPHA1 GLOB SERPL ELPH-MCNC: 0.35 G/DL (ref 0.17–0.41)
ALPHA2 GLOB SERPL ELPH-MCNC: 0.72 G/DL (ref 0.43–0.99)
B-GLOBULIN SERPL ELPH-MCNC: 0.66 G/DL (ref 0.5–1.1)
GAMMA GLOB SERPL ELPH-MCNC: 0.76 G/DL (ref 0.67–1.58)
INTERPRETATION SERPL IFE-IMP: NORMAL
KAPPA LC SER QL IA: 0.8 MG/DL (ref 0.33–1.94)
KAPPA LC/LAMBDA SER IA: 0.68 (ref 0.26–1.65)
LAMBDA LC SER QL IA: 1.17 MG/DL (ref 0.57–2.63)
PATHOLOGIST INTERPRETATION IFE: NORMAL
PATHOLOGIST INTERPRETATION SPE: NORMAL
PROT SERPL-MCNC: 6.6 G/DL (ref 6–8.4)
URATE SERPL-MCNC: 4.3 MG/DL (ref 2.4–5.7)

## 2020-02-12 ENCOUNTER — TELEPHONE (OUTPATIENT)
Dept: INTERNAL MEDICINE | Facility: CLINIC | Age: 55
End: 2020-02-12

## 2020-02-12 ENCOUNTER — PATIENT MESSAGE (OUTPATIENT)
Dept: INTERNAL MEDICINE | Facility: CLINIC | Age: 55
End: 2020-02-12

## 2020-03-03 ENCOUNTER — TELEPHONE (OUTPATIENT)
Dept: INTERNAL MEDICINE | Facility: CLINIC | Age: 55
End: 2020-03-03

## 2020-04-03 ENCOUNTER — TELEPHONE (OUTPATIENT)
Dept: HEMATOLOGY/ONCOLOGY | Facility: CLINIC | Age: 55
End: 2020-04-03

## 2020-04-21 DIAGNOSIS — Z01.84 ANTIBODY RESPONSE EXAMINATION: ICD-10-CM

## 2020-05-21 DIAGNOSIS — Z01.84 ANTIBODY RESPONSE EXAMINATION: ICD-10-CM

## 2020-06-09 ENCOUNTER — LAB VISIT (OUTPATIENT)
Dept: LAB | Facility: HOSPITAL | Age: 55
End: 2020-06-09
Attending: STUDENT IN AN ORGANIZED HEALTH CARE EDUCATION/TRAINING PROGRAM
Payer: COMMERCIAL

## 2020-06-09 ENCOUNTER — OFFICE VISIT (OUTPATIENT)
Dept: HEMATOLOGY/ONCOLOGY | Facility: CLINIC | Age: 55
End: 2020-06-09
Payer: COMMERCIAL

## 2020-06-09 DIAGNOSIS — M54.9 BACK PAIN, UNSPECIFIED BACK LOCATION, UNSPECIFIED BACK PAIN LATERALITY, UNSPECIFIED CHRONICITY: ICD-10-CM

## 2020-06-09 DIAGNOSIS — C90.31 SOLITARY PLASMACYTOMA IN REMISSION: Primary | ICD-10-CM

## 2020-06-09 DIAGNOSIS — G56.03 BILATERAL CARPAL TUNNEL SYNDROME: ICD-10-CM

## 2020-06-09 DIAGNOSIS — C90.31 SOLITARY PLASMACYTOMA IN REMISSION: ICD-10-CM

## 2020-06-09 DIAGNOSIS — G95.9 CERVICAL MYELOPATHY: ICD-10-CM

## 2020-06-09 DIAGNOSIS — Z92.3 HISTORY OF RADIATION EXPOSURE TO SPINE: ICD-10-CM

## 2020-06-09 LAB
ALBUMIN SERPL BCP-MCNC: 3.7 G/DL (ref 3.5–5.2)
ALP SERPL-CCNC: 78 U/L (ref 55–135)
ALT SERPL W/O P-5'-P-CCNC: 16 U/L (ref 10–44)
ANION GAP SERPL CALC-SCNC: 6 MMOL/L (ref 8–16)
AST SERPL-CCNC: 15 U/L (ref 10–40)
BASOPHILS # BLD AUTO: 0.03 K/UL (ref 0–0.2)
BASOPHILS NFR BLD: 0.6 % (ref 0–1.9)
BILIRUB SERPL-MCNC: 0.2 MG/DL (ref 0.1–1)
BUN SERPL-MCNC: 12 MG/DL (ref 6–20)
CALCIUM SERPL-MCNC: 9.1 MG/DL (ref 8.7–10.5)
CHLORIDE SERPL-SCNC: 108 MMOL/L (ref 95–110)
CO2 SERPL-SCNC: 26 MMOL/L (ref 23–29)
CREAT SERPL-MCNC: 0.9 MG/DL (ref 0.5–1.4)
DIFFERENTIAL METHOD: ABNORMAL
EOSINOPHIL # BLD AUTO: 0.1 K/UL (ref 0–0.5)
EOSINOPHIL NFR BLD: 1.7 % (ref 0–8)
ERYTHROCYTE [DISTWIDTH] IN BLOOD BY AUTOMATED COUNT: 12.9 % (ref 11.5–14.5)
EST. GFR  (AFRICAN AMERICAN): >60 ML/MIN/1.73 M^2
EST. GFR  (NON AFRICAN AMERICAN): >60 ML/MIN/1.73 M^2
GLUCOSE SERPL-MCNC: 82 MG/DL (ref 70–110)
HCT VFR BLD AUTO: 40.6 % (ref 37–48.5)
HGB BLD-MCNC: 12.9 G/DL (ref 12–16)
IGA SERPL-MCNC: 73 MG/DL (ref 40–350)
IGG SERPL-MCNC: 834 MG/DL (ref 650–1600)
IGM SERPL-MCNC: 108 MG/DL (ref 50–300)
IMM GRANULOCYTES # BLD AUTO: 0.01 K/UL (ref 0–0.04)
IMM GRANULOCYTES NFR BLD AUTO: 0.2 % (ref 0–0.5)
LDH SERPL L TO P-CCNC: 177 U/L (ref 110–260)
LYMPHOCYTES # BLD AUTO: 1.4 K/UL (ref 1–4.8)
LYMPHOCYTES NFR BLD: 29.6 % (ref 18–48)
MCH RBC QN AUTO: 30.2 PG (ref 27–31)
MCHC RBC AUTO-ENTMCNC: 31.8 G/DL (ref 32–36)
MCV RBC AUTO: 95 FL (ref 82–98)
MONOCYTES # BLD AUTO: 0.5 K/UL (ref 0.3–1)
MONOCYTES NFR BLD: 10.6 % (ref 4–15)
NEUTROPHILS # BLD AUTO: 2.7 K/UL (ref 1.8–7.7)
NEUTROPHILS NFR BLD: 57.3 % (ref 38–73)
NRBC BLD-RTO: 0 /100 WBC
PLATELET # BLD AUTO: 231 K/UL (ref 150–350)
PMV BLD AUTO: 10.4 FL (ref 9.2–12.9)
POTASSIUM SERPL-SCNC: 4 MMOL/L (ref 3.5–5.1)
PROT SERPL-MCNC: 7.1 G/DL (ref 6–8.4)
RBC # BLD AUTO: 4.27 M/UL (ref 4–5.4)
SODIUM SERPL-SCNC: 140 MMOL/L (ref 136–145)
URATE SERPL-MCNC: 4.2 MG/DL (ref 2.4–5.7)
WBC # BLD AUTO: 4.63 K/UL (ref 3.9–12.7)

## 2020-06-09 PROCEDURE — 80053 COMPREHEN METABOLIC PANEL: CPT

## 2020-06-09 PROCEDURE — 86334 PATHOLOGIST INTERPRETATION IFE: ICD-10-PCS | Mod: 26,,, | Performed by: PATHOLOGY

## 2020-06-09 PROCEDURE — 84165 PROTEIN E-PHORESIS SERUM: CPT | Mod: 26,,, | Performed by: PATHOLOGY

## 2020-06-09 PROCEDURE — 85025 COMPLETE CBC W/AUTO DIFF WBC: CPT

## 2020-06-09 PROCEDURE — 82784 ASSAY IGA/IGD/IGG/IGM EACH: CPT | Mod: 59

## 2020-06-09 PROCEDURE — 86334 IMMUNOFIX E-PHORESIS SERUM: CPT

## 2020-06-09 PROCEDURE — 84165 PROTEIN E-PHORESIS SERUM: CPT

## 2020-06-09 PROCEDURE — 83520 IMMUNOASSAY QUANT NOS NONAB: CPT | Mod: 59

## 2020-06-09 PROCEDURE — 99214 PR OFFICE/OUTPT VISIT, EST, LEVL IV, 30-39 MIN: ICD-10-PCS | Mod: 95,,, | Performed by: STUDENT IN AN ORGANIZED HEALTH CARE EDUCATION/TRAINING PROGRAM

## 2020-06-09 PROCEDURE — 86334 IMMUNOFIX E-PHORESIS SERUM: CPT | Mod: 26,,, | Performed by: PATHOLOGY

## 2020-06-09 PROCEDURE — 84165 PATHOLOGIST INTERPRETATION SPE: ICD-10-PCS | Mod: 26,,, | Performed by: PATHOLOGY

## 2020-06-09 PROCEDURE — 36415 COLL VENOUS BLD VENIPUNCTURE: CPT

## 2020-06-09 PROCEDURE — 83615 LACTATE (LD) (LDH) ENZYME: CPT

## 2020-06-09 PROCEDURE — 84550 ASSAY OF BLOOD/URIC ACID: CPT

## 2020-06-09 PROCEDURE — 99214 OFFICE O/P EST MOD 30 MIN: CPT | Mod: 95,,, | Performed by: STUDENT IN AN ORGANIZED HEALTH CARE EDUCATION/TRAINING PROGRAM

## 2020-06-09 NOTE — PROGRESS NOTES
The patient location is: Home  The chief complaint leading to consultation is: Solitary plasmacytoma in remission    Visit type: audiovisual    Face to Face time with patient: 25 mins. 30 minutes of total time spent on the encounter, which includes face to face time and non-face to face time preparing to see the patient (eg, review of tests), Obtaining and/or reviewing separately obtained history, Documenting clinical information in the electronic or other health record, Independently interpreting results (not separately reported) and communicating results to the patient/family/caregiver, or Care coordination (not separately reported).     Each patient to whom he or she provides medical services by telemedicine is:  (1) informed of the relationship between the physician and patient and the respective role of any other health care provider with respect to management of the patient; and (2) notified that he or she may decline to receive medical services by telemedicine and may withdraw from such care at any time.    PATIENT: Mary Blair  MRN: [id number]  DATE: 6/8/2020      Diagnosis:   1. Solitary plasmacytoma in remission    2. Back pain, unspecified back location, unspecified back pain laterality, unspecified chronicity    3. History of radiation exposure to spine    4. Bilateral carpal tunnel syndrome    5. Cervical myelopathy        Chief Complaint: No chief complaint on file.    Mary Blair is a 53M with PMHx of plasmacytoma of the lumbar spine previously treated at [facility] with plasmacytoma presented to our hematology clinic to establish care.     She initially presented with back pain in early February of 2018 and underwent bone scan on 2/27/2018 which revealed Increased uptake within L3 body corresponding to lytic lesion seen on 2/16/18 CT and underwent back surgery on 2/28/2018. A bone marrow biopsy done on 3/8/2018 was negative for any malignancy. However the biopsy of L3 vertebral body lesion  "revealed lambda restricted plasma cell neoplasm and skeletal survey done on 3/21/2018 revealed "Periprosthetic lucency associated with the left proximal femur potentially reflecting osteolysis rather than malignancy". She underwent ratiation threapy to L3, L4 and L5 of spine from March to May 8, 2018 for a total of 8 weeks due to suspicion for invasion of adjacent vertebrae.      4/11/2018 xray of L-spine revealed no suspicious osseous lesions and intact posterior spinal fusion L2-L4.  4/13/2018 - MIRNA + UPEP- Normal immunofixation pattern  4/26/2018 - CT abdo/pelvis- NO suspicious osseous lesions identified  4/26/2018 - CT Thoracic Spine W/WO - No suspicious osseous lesions identified.    5/23/2018 - B2 microglobulin:  1.8 mg/L (ref 0.6-2.4). CBC, CMP unremarkable   - Immunoglobulins: Normal   - LDH: 178 ()   - SPEP- Total protein normal.  Apparent normal immunofixation pattern. Normal FLC ratio     8/03/18 - MRI C-spine WO - No focal lesions observed  8/17/18 - MRI L-Spine W/WO - No focal lesions observed  9/24/18 - MRI brain W/Wo- Normal study  10/24/2018 -  Normal immunoglobulins, LDH. Normal SPEP and MIRNA. No M spike seen. Normal FLC ratio     She c/o chronic back pain, denies any new complaints.      She has PMHx of L hip replacements in 1986 and 2010, B/L carpal tunnel s/p surgery, R rotator cuff tear and trigger finger. She is a non smoker and drinks alcohol socially     She has family history of skin cancer and lung in grand mother     Follow up 6/8/2020  - C/o chronic back pain much worse than before, neck/shoulder pain, elbow, back pain, ankles all over. Seeing a spine surgeon Dr. Vieyra scheduled for 6/16/2020. Mobic   - c/o night sweats, weight loss of 5 lbs in 6 months due to decreased appetite  - Currently on cymbalta for fibromyalgia and Tizanidine for back spasms  - Her myeloma labs SPEP, Immunoglobulins, free LT chains, MIRNA are pending  - 12/20/2019- No FDG PET/CT findings to suggest recurrent " or metastatic disease  - Mammogram and PAP smear are normal- 2019      Subjective:    Initial History: Ms. Blair is a 55 y.o. female who returns for follow up.     Past Medical History:   Past Medical History:   Diagnosis Date    Cancer     Plasmacytoma     Rotator cuff disorder     Spinal stenosis        Past Surgical HIstory:   Past Surgical History:   Procedure Laterality Date     SECTION      corpal tunnel      JOINT REPLACEMENT      lubar sugery      ROTATOR CUFF REPAIR      TONSILLECTOMY      TOTAL HIP ARTHROPLASTY         Family History:   Family History   Problem Relation Age of Onset    Heart disease Mother     Heart disease Father        Social History:  reports that she has never smoked. She has never used smokeless tobacco. She reports that she does not use drugs.    Allergies:  Review of patient's allergies indicates:  No Known Allergies    Medications:  Current Outpatient Medications   Medication Sig Dispense Refill    acetaminophen (TYLENOL) 650 MG TbSR Take 650 mg by mouth every 8 (eight) hours.      conj estrogens-bazedoxifene (DUAVEE) 0.45-20 mg Tab Take 1 tablet by mouth once daily.       DULoxetine (CYMBALTA) 60 MG capsule Take 60 mg by mouth once daily.       ketorolac (TORADOL) 10 mg tablet       ondansetron (ZOFRAN-ODT) 8 MG TbDL Take 1 tablet (8 mg total) by mouth every 8 (eight) hours as needed (Nausea). 24 tablet 0    pantoprazole (PROTONIX) 40 MG tablet       tiZANidine (ZANAFLEX) 4 MG tablet        No current facility-administered medications for this visit.        Review of Systems   Constitutional: Positive for diaphoresis. Negative for appetite change, chills and fever.   HENT: Negative for nosebleeds and trouble swallowing.    Respiratory: Negative for cough and shortness of breath.    Cardiovascular: Negative for chest pain.   Gastrointestinal: Negative for abdominal pain, blood in stool, diarrhea, nausea and vomiting.   Genitourinary: Negative for  hematuria.   Musculoskeletal: Positive for back pain and neck pain.   Skin: Negative for rash.   Neurological: Negative for seizures, syncope and headaches.   Hematological: Negative for adenopathy.   Psychiatric/Behavioral: Negative for agitation and behavioral problems. The patient is nervous/anxious.        ECOG Performance Status: 1   Objective:      Vitals: There were no vitals filed for this visit.  BMI: There is no height or weight on file to calculate BMI.    Physical Exam  Deferred due to Virtual visit    Laboratory Data:  No visits with results within 1 Week(s) from this visit.   Latest known visit with results is:   Lab Visit on 01/07/2020   Component Date Value Ref Range Status    WBC 01/07/2020 4.47  3.90 - 12.70 K/uL Final    RBC 01/07/2020 4.10  4.00 - 5.40 M/uL Final    Hemoglobin 01/07/2020 12.3  12.0 - 16.0 g/dL Final    Hematocrit 01/07/2020 39.0  37.0 - 48.5 % Final    Mean Corpuscular Volume 01/07/2020 95  82 - 98 fL Final    Mean Corpuscular Hemoglobin 01/07/2020 30.0  27.0 - 31.0 pg Final    Mean Corpuscular Hemoglobin Conc 01/07/2020 31.5* 32.0 - 36.0 g/dL Final    RDW 01/07/2020 13.3  11.5 - 14.5 % Final    Platelets 01/07/2020 244  150 - 350 K/uL Final    MPV 01/07/2020 10.2  9.2 - 12.9 fL Final    Immature Granulocytes 01/07/2020 0.2  0.0 - 0.5 % Final    Gran # (ANC) 01/07/2020 2.8  1.8 - 7.7 K/uL Final    Immature Grans (Abs) 01/07/2020 0.01  0.00 - 0.04 K/uL Final    Comment: Mild elevation in immature granulocytes is non specific and   can be seen in a variety of conditions including stress response,   acute inflammation, trauma and pregnancy. Correlation with other   laboratory and clinical findings is essential.      Lymph # 01/07/2020 1.1  1.0 - 4.8 K/uL Final    Mono # 01/07/2020 0.4  0.3 - 1.0 K/uL Final    Eos # 01/07/2020 0.1  0.0 - 0.5 K/uL Final    Baso # 01/07/2020 0.02  0.00 - 0.20 K/uL Final    nRBC 01/07/2020 0  0 /100 WBC Final    Gran% 01/07/2020 63.7   38.0 - 73.0 % Final    Lymph% 01/07/2020 23.7  18.0 - 48.0 % Final    Mono% 01/07/2020 9.8  4.0 - 15.0 % Final    Eosinophil% 01/07/2020 2.2  0.0 - 8.0 % Final    Basophil% 01/07/2020 0.4  0.0 - 1.9 % Final    Differential Method 01/07/2020 Automated   Final    Sodium 01/07/2020 143  136 - 145 mmol/L Final    Potassium 01/07/2020 4.0  3.5 - 5.1 mmol/L Final    Chloride 01/07/2020 111* 95 - 110 mmol/L Final    CO2 01/07/2020 25  23 - 29 mmol/L Final    Glucose 01/07/2020 100  70 - 110 mg/dL Final    BUN, Bld 01/07/2020 10  6 - 20 mg/dL Final    Creatinine 01/07/2020 0.8  0.5 - 1.4 mg/dL Final    Calcium 01/07/2020 9.1  8.7 - 10.5 mg/dL Final    Total Protein 01/07/2020 6.8  6.0 - 8.4 g/dL Final    Albumin 01/07/2020 3.6  3.5 - 5.2 g/dL Final    Total Bilirubin 01/07/2020 0.2  0.1 - 1.0 mg/dL Final    Comment: For infants and newborns, interpretation of results should be based  on gestational age, weight and in agreement with clinical  observations.  Premature Infant recommended reference ranges:  Up to 24 hours.............<8.0 mg/dL  Up to 48 hours............<12.0 mg/dL  3-5 days..................<15.0 mg/dL  6-29 days.................<15.0 mg/dL      Alkaline Phosphatase 01/07/2020 74  55 - 135 U/L Final    AST 01/07/2020 17  10 - 40 U/L Final    ALT 01/07/2020 19  10 - 44 U/L Final    Anion Gap 01/07/2020 7* 8 - 16 mmol/L Final    eGFR if African American 01/07/2020 >60.0  >60 mL/min/1.73 m^2 Final    eGFR if non African American 01/07/2020 >60.0  >60 mL/min/1.73 m^2 Final    Comment: Calculation used to obtain the estimated glomerular filtration  rate (eGFR) is the CKD-EPI equation.       Kappa Free Light Chains 01/07/2020 0.80  0.33 - 1.94 mg/dL Final    Lambda Free Light Chains 01/07/2020 1.17  0.57 - 2.63 mg/dL Final    Kappa/Lambda FLC Ratio 01/07/2020 0.68  0.26 - 1.65 Final    IgG - Serum 01/07/2020 839  650 - 1600 mg/dL Final    IgG Cord Blood Reference Range: 650-1600 mg/dL.     IgA 01/07/2020 68  40 - 350 mg/dL Final    IgA Cord Blood Reference Range: <5 mg/dL.    IgM 01/07/2020 100  50 - 300 mg/dL Final    IgM Cord Blood Reference Range: <25 mg/dL.    Immunofix Interp. 01/07/2020 SEE COMMENT   Final    Comment: Serum protein electrophoresis and immunofixation results should be   interpreted in clinical context in that some therapeutic agents can   result   in false positive results (example, daratumumab). Correlation with   the   patient s therapeutic regimen is required.  See pathologist's interpretation.      Protein, Serum 01/07/2020 6.6  6.0 - 8.4 g/dL Final    Comment: Serum protein electrophoresis and immunofixation results should be   interpreted in clinical context in that some therapeutic agents can   result   in false positive results (example, daratumumab). Correlation with   the   patient s therapeutic regimen is required.      Albumin grams/dl 01/07/2020 4.11  3.35 - 5.55 g/dL Final    Alpha-1 grams/dl 01/07/2020 0.35  0.17 - 0.41 g/dL Final    Alpha-2 grams/dl 01/07/2020 0.72  0.43 - 0.99 g/dL Final    Beta grams/dl 01/07/2020 0.66  0.50 - 1.10 g/dL Final    Gamma grams/dl 01/07/2020 0.76  0.67 - 1.58 g/dL Final    LD 01/07/2020 182  110 - 260 U/L Final    Results are increased in hemolyzed samples.    Uric Acid 01/07/2020 4.3  2.4 - 5.7 mg/dL Final    Beta-2 Microglobulin 01/07/2020 1.7  0.0 - 2.5 ug/mL Final    Pathologist Interpretation MIRNA 01/07/2020 REVIEWED   Final    Comment: Electronically reviewed and signed by:  Janine Villegas MD  Signed on 01/08/20 at 14:46  No monoclonal peaks identified.      Pathologist Interpretation SPE 01/07/2020 REVIEWED   Final    Comment: Electronically reviewed and signed by:  Janine Villegas MD  Signed on 01/08/20 at 14:46  Normal total protein, normal pattern.           Imaging:       PET/CT 12/20/2019  1.  No definite evidence of active osseous disease.  In this patient with L3 plasmacytoma status post radiation  therapy, there is mild nonspecific uptake about the L3 vertebroplasty cement favored to represent postoperative inflammation status post removal of lumbar fusion hardware.  Attention on follow-up recommended.    2.  No evidence of extramedullary disease.  Mild soft tissue thickening overlying the posterior paraspinous musculature at the L2-L4 level status post removal of hardware.  Mild tracer uptake within this region compatible with soft tissue infectious/noninfectious inflammation.    Assessment:       1. Solitary plasmacytoma in remission    2. Back pain, unspecified back location, unspecified back pain laterality, unspecified chronicity    3. History of radiation exposure to spine    4. Bilateral carpal tunnel syndrome    5. Cervical myelopathy      1. Solitary Plasmacytoma of vertebrae s/p definite radiation treatments to L3 to L5 in May 2018. Her recent PET/CT done on 12/20/2019 did not suggest any recurrent or metastatic disease  Her Myeloma labs have been negative so far. Previous imaging has been negative for any lytic lesions  No signs of local recurrent/systemic myeloma/other plasmacytoma  Today's SPEP/FLC/MIRNA all pending. Her CBC, CMP, LDH, Immunoglobulins are normal      2. Body pain all over: c/o chronic pain in the back, shoulders, hands and legs. She was diagnosed with fibromyalgia and currently on Cymbalta with moderate improvement. She tried gabapentin and Lyrica in the past with little help  3. Bilateral Carpal tunnel syndrome s/p surgery       Plan:     1. Stable Myeloma labs from today. Scheduled for MRI spine on 6/16/2020  2. Based on her results of MRI spine we will discuss further plan of care.     If results are normal, then we will follow up in 3 months with labs- CBC, CMP, myeloma labs, LDH, uric acid     Plan of care discussed with Dr. Dominic Dunlap MD PGY-5  Hematology and Oncology  Pager:[phone]

## 2020-06-10 LAB
ALBUMIN SERPL ELPH-MCNC: 4.08 G/DL (ref 3.35–5.55)
ALPHA1 GLOB SERPL ELPH-MCNC: 0.57 G/DL (ref 0.17–0.41)
ALPHA2 GLOB SERPL ELPH-MCNC: 0.78 G/DL (ref 0.43–0.99)
B-GLOBULIN SERPL ELPH-MCNC: 0.7 G/DL (ref 0.5–1.1)
GAMMA GLOB SERPL ELPH-MCNC: 0.78 G/DL (ref 0.67–1.58)
INTERPRETATION SERPL IFE-IMP: NORMAL
KAPPA LC SER QL IA: 0.82 MG/DL (ref 0.33–1.94)
KAPPA LC/LAMBDA SER IA: 0.66 (ref 0.26–1.65)
LAMBDA LC SER QL IA: 1.24 MG/DL (ref 0.57–2.63)
PROT SERPL-MCNC: 6.9 G/DL (ref 6–8.4)

## 2020-06-11 LAB
PATHOLOGIST INTERPRETATION IFE: NORMAL
PATHOLOGIST INTERPRETATION SPE: NORMAL

## 2020-06-20 DIAGNOSIS — Z01.84 ANTIBODY RESPONSE EXAMINATION: ICD-10-CM

## 2020-07-20 DIAGNOSIS — Z01.84 ANTIBODY RESPONSE EXAMINATION: ICD-10-CM

## 2020-08-19 DIAGNOSIS — Z01.84 ANTIBODY RESPONSE EXAMINATION: ICD-10-CM

## 2020-09-02 ENCOUNTER — TELEPHONE (OUTPATIENT)
Dept: HEMATOLOGY/ONCOLOGY | Facility: CLINIC | Age: 55
End: 2020-09-02

## 2020-09-02 NOTE — TELEPHONE ENCOUNTER
----- Message from Aneta Spencer sent at 9/2/2020  8:33 AM CDT -----  Patient called to reschedule an appointment specifically with Dr Dunlap  And wishes to speak with a nurse regarding this matter.          can be reached at [phone]    Thanks  KB

## 2020-09-15 ENCOUNTER — OFFICE VISIT (OUTPATIENT)
Dept: HEMATOLOGY/ONCOLOGY | Facility: CLINIC | Age: 55
End: 2020-09-15
Payer: COMMERCIAL

## 2020-09-15 ENCOUNTER — LAB VISIT (OUTPATIENT)
Dept: LAB | Facility: HOSPITAL | Age: 55
End: 2020-09-15
Attending: STUDENT IN AN ORGANIZED HEALTH CARE EDUCATION/TRAINING PROGRAM
Payer: COMMERCIAL

## 2020-09-15 VITALS
HEIGHT: 62 IN | SYSTOLIC BLOOD PRESSURE: 131 MMHG | RESPIRATION RATE: 18 BRPM | OXYGEN SATURATION: 97 % | HEART RATE: 71 BPM | TEMPERATURE: 99 F | BODY MASS INDEX: 34.4 KG/M2 | WEIGHT: 186.94 LBS | DIASTOLIC BLOOD PRESSURE: 63 MMHG

## 2020-09-15 DIAGNOSIS — C90.31 SOLITARY PLASMACYTOMA IN REMISSION: ICD-10-CM

## 2020-09-15 DIAGNOSIS — Z92.3 HISTORY OF RADIATION EXPOSURE TO SPINE: ICD-10-CM

## 2020-09-15 DIAGNOSIS — G95.9 CERVICAL MYELOPATHY: ICD-10-CM

## 2020-09-15 DIAGNOSIS — C90.31 SOLITARY PLASMACYTOMA IN REMISSION: Primary | ICD-10-CM

## 2020-09-15 DIAGNOSIS — G56.03 BILATERAL CARPAL TUNNEL SYNDROME: ICD-10-CM

## 2020-09-15 DIAGNOSIS — M54.9 BACK PAIN, UNSPECIFIED BACK LOCATION, UNSPECIFIED BACK PAIN LATERALITY, UNSPECIFIED CHRONICITY: ICD-10-CM

## 2020-09-15 LAB
ALBUMIN SERPL BCP-MCNC: 3.9 G/DL (ref 3.5–5.2)
ALP SERPL-CCNC: 79 U/L (ref 55–135)
ALT SERPL W/O P-5'-P-CCNC: 18 U/L (ref 10–44)
ANION GAP SERPL CALC-SCNC: 8 MMOL/L (ref 8–16)
AST SERPL-CCNC: 20 U/L (ref 10–40)
BASOPHILS # BLD AUTO: 0.04 K/UL (ref 0–0.2)
BASOPHILS NFR BLD: 0.8 % (ref 0–1.9)
BILIRUB SERPL-MCNC: 0.2 MG/DL (ref 0.1–1)
BUN SERPL-MCNC: 10 MG/DL (ref 6–20)
CALCIUM SERPL-MCNC: 8.8 MG/DL (ref 8.7–10.5)
CHLORIDE SERPL-SCNC: 106 MMOL/L (ref 95–110)
CO2 SERPL-SCNC: 27 MMOL/L (ref 23–29)
CREAT SERPL-MCNC: 0.9 MG/DL (ref 0.5–1.4)
DIFFERENTIAL METHOD: ABNORMAL
EOSINOPHIL # BLD AUTO: 0.1 K/UL (ref 0–0.5)
EOSINOPHIL NFR BLD: 1.8 % (ref 0–8)
ERYTHROCYTE [DISTWIDTH] IN BLOOD BY AUTOMATED COUNT: 13.2 % (ref 11.5–14.5)
EST. GFR  (AFRICAN AMERICAN): >60 ML/MIN/1.73 M^2
EST. GFR  (NON AFRICAN AMERICAN): >60 ML/MIN/1.73 M^2
GLUCOSE SERPL-MCNC: 89 MG/DL (ref 70–110)
HCT VFR BLD AUTO: 41 % (ref 37–48.5)
HGB BLD-MCNC: 12.6 G/DL (ref 12–16)
IGA SERPL-MCNC: 82 MG/DL (ref 40–350)
IGG SERPL-MCNC: 842 MG/DL (ref 650–1600)
IGM SERPL-MCNC: 105 MG/DL (ref 50–300)
IMM GRANULOCYTES # BLD AUTO: 0.01 K/UL (ref 0–0.04)
IMM GRANULOCYTES NFR BLD AUTO: 0.2 % (ref 0–0.5)
LDH SERPL L TO P-CCNC: 178 U/L (ref 110–260)
LYMPHOCYTES # BLD AUTO: 1.5 K/UL (ref 1–4.8)
LYMPHOCYTES NFR BLD: 30.6 % (ref 18–48)
MCH RBC QN AUTO: 30 PG (ref 27–31)
MCHC RBC AUTO-ENTMCNC: 30.7 G/DL (ref 32–36)
MCV RBC AUTO: 98 FL (ref 82–98)
MONOCYTES # BLD AUTO: 0.5 K/UL (ref 0.3–1)
MONOCYTES NFR BLD: 10.9 % (ref 4–15)
NEUTROPHILS # BLD AUTO: 2.8 K/UL (ref 1.8–7.7)
NEUTROPHILS NFR BLD: 55.7 % (ref 38–73)
NRBC BLD-RTO: 0 /100 WBC
PLATELET # BLD AUTO: 258 K/UL (ref 150–350)
PMV BLD AUTO: 10.1 FL (ref 9.2–12.9)
POTASSIUM SERPL-SCNC: 4.4 MMOL/L (ref 3.5–5.1)
PROT SERPL-MCNC: 7.2 G/DL (ref 6–8.4)
RBC # BLD AUTO: 4.2 M/UL (ref 4–5.4)
SODIUM SERPL-SCNC: 141 MMOL/L (ref 136–145)
WBC # BLD AUTO: 4.94 K/UL (ref 3.9–12.7)

## 2020-09-15 PROCEDURE — 99215 OFFICE O/P EST HI 40 MIN: CPT | Mod: S$GLB,,, | Performed by: STUDENT IN AN ORGANIZED HEALTH CARE EDUCATION/TRAINING PROGRAM

## 2020-09-15 PROCEDURE — 99215 PR OFFICE/OUTPT VISIT, EST, LEVL V, 40-54 MIN: ICD-10-PCS | Mod: S$GLB,,, | Performed by: STUDENT IN AN ORGANIZED HEALTH CARE EDUCATION/TRAINING PROGRAM

## 2020-09-15 PROCEDURE — 86334 IMMUNOFIX E-PHORESIS SERUM: CPT

## 2020-09-15 PROCEDURE — 85025 COMPLETE CBC W/AUTO DIFF WBC: CPT

## 2020-09-15 PROCEDURE — 3008F BODY MASS INDEX DOCD: CPT | Mod: CPTII,S$GLB,, | Performed by: STUDENT IN AN ORGANIZED HEALTH CARE EDUCATION/TRAINING PROGRAM

## 2020-09-15 PROCEDURE — 82784 ASSAY IGA/IGD/IGG/IGM EACH: CPT | Mod: 59

## 2020-09-15 PROCEDURE — 99999 PR PBB SHADOW E&M-EST. PATIENT-LVL IV: ICD-10-PCS | Mod: PBBFAC,,, | Performed by: STUDENT IN AN ORGANIZED HEALTH CARE EDUCATION/TRAINING PROGRAM

## 2020-09-15 PROCEDURE — 83615 LACTATE (LD) (LDH) ENZYME: CPT

## 2020-09-15 PROCEDURE — 86334 PATHOLOGIST INTERPRETATION IFE: ICD-10-PCS | Mod: 26,,, | Performed by: PATHOLOGY

## 2020-09-15 PROCEDURE — 84165 PATHOLOGIST INTERPRETATION SPE: ICD-10-PCS | Mod: 26,,, | Performed by: PATHOLOGY

## 2020-09-15 PROCEDURE — 36415 COLL VENOUS BLD VENIPUNCTURE: CPT

## 2020-09-15 PROCEDURE — 84165 PROTEIN E-PHORESIS SERUM: CPT

## 2020-09-15 PROCEDURE — 84165 PROTEIN E-PHORESIS SERUM: CPT | Mod: 26,,, | Performed by: PATHOLOGY

## 2020-09-15 PROCEDURE — 99999 PR PBB SHADOW E&M-EST. PATIENT-LVL IV: CPT | Mod: PBBFAC,,, | Performed by: STUDENT IN AN ORGANIZED HEALTH CARE EDUCATION/TRAINING PROGRAM

## 2020-09-15 PROCEDURE — 3008F PR BODY MASS INDEX (BMI) DOCUMENTED: ICD-10-PCS | Mod: CPTII,S$GLB,, | Performed by: STUDENT IN AN ORGANIZED HEALTH CARE EDUCATION/TRAINING PROGRAM

## 2020-09-15 PROCEDURE — 86334 IMMUNOFIX E-PHORESIS SERUM: CPT | Mod: 26,,, | Performed by: PATHOLOGY

## 2020-09-15 PROCEDURE — 80053 COMPREHEN METABOLIC PANEL: CPT

## 2020-09-15 PROCEDURE — 83520 IMMUNOASSAY QUANT NOS NONAB: CPT

## 2020-09-15 NOTE — PROGRESS NOTES
"  PATIENT: Mary Blair  MRN: [id number]  DATE: 9/15/2020      Diagnosis:   1. Solitary plasmacytoma in remission    2. Back pain, unspecified back location, unspecified back pain laterality, unspecified chronicity    3. History of radiation exposure to spine    4. Bilateral carpal tunnel syndrome    5. Cervical myelopathy        Chief Complaint: Solitary plasmacytoma in remission    Mary Blair is a 53M with PMHx of plasmacytoma of the lumbar spine previously treated at [facility] with plasmacytoma presented to our hematology clinic as a follow up    Follow up 9/15/2020  - C/o chronic back pain much worse than before, neck/shoulder pain, elbow, back pain, ankles all over. Seeing a spine surgeon Dr. Vieyra and had MRI in 6/2020 which revealed mild disc prolapse in lumbar disc  - c/o feeling cold mostly evening, but never had temperature >100.4. No weight loss, vomiting, headaches, diarrhea or bleeding issues  - Currently on cymbalta for fibromyalgia and Tizanidine for back spasms  - Her myeloma labs SPEP, Immunoglobulins, free LT chains, MIRNA are pending  - 12/20/2019- No FDG PET/CT findings to suggest recurrent or metastatic disease    Hematology History   She initially presented with back pain in early February of 2018 and underwent bone scan on 2/27/2018 which revealed Increased uptake within L3 body corresponding to lytic lesion seen on 2/16/18 CT and underwent back surgery on 2/28/2018. A bone marrow biopsy done on 3/8/2018 was negative for any malignancy. However the biopsy of L3 vertebral body lesion revealed lambda restricted plasma cell neoplasm and skeletal survey done on 3/21/2018 revealed "Periprosthetic lucency associated with the left proximal femur potentially reflecting osteolysis rather than malignancy". She underwent ratiation threapy to L3, L4 and L5 of spine from March to May 8, 2018 for a total of 8 weeks due to suspicion for invasion of adjacent vertebrae.      4/11/2018 xray of L-spine " revealed no suspicious osseous lesions and intact posterior spinal fusion L2-L4.  2018 - MIRNA + UPEP- Normal immunofixation pattern  2018 - CT abdo/pelvis- NO suspicious osseous lesions identified  2018 - CT Thoracic Spine W/WO - No suspicious osseous lesions identified.    2018 - B2 microglobulin:  1.8 mg/L (ref 0.6-2.4). CBC, CMP unremarkable   - Immunoglobulins: Normal   - LDH: 178 ()    - SPEP- Total protein normal.  Apparent normal immunofixation pattern. Normal FLC ratio     18 - MRI C-spine WO - No focal lesions observed  18 - MRI L-Spine W/WO - No focal lesions observed  18 - MRI brain W/Wo- Normal study  10/24/2018 -  Normal immunoglobulins, LDH. Normal SPEP and MIRNA. No M spike seen. Normal FLC ratio     She c/o chronic back pain, denies any new complaints.      She has PMHx of L hip replacements in  and , B/L carpal tunnel s/p surgery, R rotator cuff tear and trigger finger. She is a non smoker and drinks alcohol socially     She has family history of skin cancer and lung in grand mother      Subjective:    Initial History: Ms. Blair is a 55 y.o. female who returns for follow up.     Past Medical History:   Past Medical History:   Diagnosis Date    Cancer     Plasmacytoma     Rotator cuff disorder     Spinal stenosis        Past Surgical HIstory:   Past Surgical History:   Procedure Laterality Date     SECTION      corpal tunnel      JOINT REPLACEMENT      lubar sugery      ROTATOR CUFF REPAIR      TONSILLECTOMY      TOTAL HIP ARTHROPLASTY         Family History:   Family History   Problem Relation Age of Onset    Heart disease Mother     Heart disease Father        Social History:  reports that she has never smoked. She has never used smokeless tobacco. She reports that she does not use drugs.    Allergies:  Review of patient's allergies indicates:  No Known Allergies    Medications:  Current Outpatient Medications   Medication Sig  "Dispense Refill    acetaminophen (TYLENOL) 650 MG TbSR Take 650 mg by mouth every 8 (eight) hours.      conj estrogens-bazedoxifene (DUAVEE) 0.45-20 mg Tab Take 1 tablet by mouth once daily.       DULoxetine (CYMBALTA) 60 MG capsule Take 60 mg by mouth once daily.       ketorolac (TORADOL) 10 mg tablet       ondansetron (ZOFRAN-ODT) 8 MG TbDL Take 1 tablet (8 mg total) by mouth every 8 (eight) hours as needed (Nausea). 24 tablet 0    pantoprazole (PROTONIX) 40 MG tablet       tiZANidine (ZANAFLEX) 4 MG tablet        No current facility-administered medications for this visit.        Review of Systems   Constitutional: Negative for appetite change, chills, diaphoresis and fever.   HENT: Negative for nosebleeds and trouble swallowing.    Respiratory: Negative for cough and shortness of breath.    Cardiovascular: Negative for chest pain.   Gastrointestinal: Negative for abdominal pain, blood in stool, diarrhea, nausea and vomiting.   Genitourinary: Negative for hematuria.   Musculoskeletal: Positive for back pain and neck pain.   Skin: Negative for rash.   Neurological: Negative for seizures, syncope and headaches.   Hematological: Negative for adenopathy.   Psychiatric/Behavioral: Negative for agitation and behavioral problems. The patient is nervous/anxious.        ECOG Performance Status: 1   Objective:      Vitals:   Vitals:    09/15/20 1532   BP: 131/63   BP Location: Left arm   Patient Position: Sitting   BP Method: Large (Automatic)   Pulse: 71   Resp: 18   Temp: 98.5 °F (36.9 °C)   TempSrc: Oral   SpO2: 97%   Weight: 84.8 kg (186 lb 15.2 oz)   Height: 5' 2" (1.575 m)     BMI: Body mass index is 34.19 kg/m².    Physical Exam  Constitutional:       Appearance: Normal appearance.   HENT:      Head: Normocephalic and atraumatic.      Nose: Nose normal.      Mouth/Throat:      Mouth: Mucous membranes are moist.   Eyes:      Extraocular Movements: Extraocular movements intact.      Pupils: Pupils are equal, " round, and reactive to light.   Neck:      Musculoskeletal: Normal range of motion and neck supple.   Cardiovascular:      Rate and Rhythm: Normal rate and regular rhythm.   Pulmonary:      Effort: Pulmonary effort is normal.      Breath sounds: Normal breath sounds.   Abdominal:      General: Abdomen is flat. Bowel sounds are normal.      Palpations: Abdomen is soft.   Musculoskeletal: Normal range of motion.         General: No swelling.   Skin:     General: Skin is warm and dry.      Capillary Refill: Capillary refill takes less than 2 seconds.   Neurological:      General: No focal deficit present.      Mental Status: She is alert and oriented to person, place, and time.   Psychiatric:         Mood and Affect: Mood normal.           Laboratory Data:  Lab Visit on 09/15/2020   Component Date Value Ref Range Status    WBC 09/15/2020 4.94  3.90 - 12.70 K/uL Final    RBC 09/15/2020 4.20  4.00 - 5.40 M/uL Final    Hemoglobin 09/15/2020 12.6  12.0 - 16.0 g/dL Final    Hematocrit 09/15/2020 41.0  37.0 - 48.5 % Final    Mean Corpuscular Volume 09/15/2020 98  82 - 98 fL Final    Mean Corpuscular Hemoglobin 09/15/2020 30.0  27.0 - 31.0 pg Final    Mean Corpuscular Hemoglobin Conc 09/15/2020 30.7* 32.0 - 36.0 g/dL Final    RDW 09/15/2020 13.2  11.5 - 14.5 % Final    Platelets 09/15/2020 258  150 - 350 K/uL Final    MPV 09/15/2020 10.1  9.2 - 12.9 fL Final    Immature Granulocytes 09/15/2020 0.2  0.0 - 0.5 % Final    Gran # (ANC) 09/15/2020 2.8  1.8 - 7.7 K/uL Final    Immature Grans (Abs) 09/15/2020 0.01  0.00 - 0.04 K/uL Final    Comment: Mild elevation in immature granulocytes is non specific and   can be seen in a variety of conditions including stress response,   acute inflammation, trauma and pregnancy. Correlation with other   laboratory and clinical findings is essential.      Lymph # 09/15/2020 1.5  1.0 - 4.8 K/uL Final    Mono # 09/15/2020 0.5  0.3 - 1.0 K/uL Final    Eos # 09/15/2020 0.1  0.0 -  0.5 K/uL Final    Baso # 09/15/2020 0.04  0.00 - 0.20 K/uL Final    nRBC 09/15/2020 0  0 /100 WBC Final    Gran% 09/15/2020 55.7  38.0 - 73.0 % Final    Lymph% 09/15/2020 30.6  18.0 - 48.0 % Final    Mono% 09/15/2020 10.9  4.0 - 15.0 % Final    Eosinophil% 09/15/2020 1.8  0.0 - 8.0 % Final    Basophil% 09/15/2020 0.8  0.0 - 1.9 % Final    Differential Method 09/15/2020 Automated   Final    Sodium 09/15/2020 141  136 - 145 mmol/L Final    Potassium 09/15/2020 4.4  3.5 - 5.1 mmol/L Final    Chloride 09/15/2020 106  95 - 110 mmol/L Final    CO2 09/15/2020 27  23 - 29 mmol/L Final    Glucose 09/15/2020 89  70 - 110 mg/dL Final    BUN, Bld 09/15/2020 10  6 - 20 mg/dL Final    Creatinine 09/15/2020 0.9  0.5 - 1.4 mg/dL Final    Calcium 09/15/2020 8.8  8.7 - 10.5 mg/dL Final    Total Protein 09/15/2020 7.2  6.0 - 8.4 g/dL Final    Albumin 09/15/2020 3.9  3.5 - 5.2 g/dL Final    Total Bilirubin 09/15/2020 0.2  0.1 - 1.0 mg/dL Final    Comment: For infants and newborns, interpretation of results should be based  on gestational age, weight and in agreement with clinical  observations.  Premature Infant recommended reference ranges:  Up to 24 hours.............<8.0 mg/dL  Up to 48 hours............<12.0 mg/dL  3-5 days..................<15.0 mg/dL  6-29 days.................<15.0 mg/dL      Alkaline Phosphatase 09/15/2020 79  55 - 135 U/L Final    AST 09/15/2020 20  10 - 40 U/L Final    ALT 09/15/2020 18  10 - 44 U/L Final    Anion Gap 09/15/2020 8  8 - 16 mmol/L Final    eGFR if African American 09/15/2020 >60.0  >60 mL/min/1.73 m^2 Final    eGFR if non African American 09/15/2020 >60.0  >60 mL/min/1.73 m^2 Final    Comment: Calculation used to obtain the estimated glomerular filtration  rate (eGFR) is the CKD-EPI equation.       IgG - Serum 09/15/2020 842  650 - 1600 mg/dL Final    IgG Cord Blood Reference Range: 650-1600 mg/dL.    IgA 09/15/2020 82  40 - 350 mg/dL Final    IgA Cord Blood Reference  Range: <5 mg/dL.    IgM 09/15/2020 105  50 - 300 mg/dL Final    IgM Cord Blood Reference Range: <25 mg/dL.    Protein, Serum 09/15/2020 6.8  6.0 - 8.4 g/dL Final    Comment: Serum protein electrophoresis and immunofixation results should be   interpreted in clinical context in that some therapeutic agents can   result   in false positive results (example, daratumumab). Correlation with   the   patient s therapeutic regimen is required.      LD 09/15/2020 178  110 - 260 U/L Final    Results are increased in hemolyzed samples.         Imaging:       PET/CT 12/20/2019  1.  No definite evidence of active osseous disease.  In this patient with L3 plasmacytoma status post radiation therapy, there is mild nonspecific uptake about the L3 vertebroplasty cement favored to represent postoperative inflammation status post removal of lumbar fusion hardware.  Attention on follow-up recommended.    2.  No evidence of extramedullary disease.  Mild soft tissue thickening overlying the posterior paraspinous musculature at the L2-L4 level status post removal of hardware.  Mild tracer uptake within this region compatible with soft tissue infectious/noninfectious inflammation.    Assessment:       1. Solitary plasmacytoma in remission    2. Back pain, unspecified back location, unspecified back pain laterality, unspecified chronicity    3. History of radiation exposure to spine    4. Bilateral carpal tunnel syndrome    5. Cervical myelopathy      1. Solitary Plasmacytoma of vertebrae s/p definite radiation treatments to L3 to L5 in May 2018. Her recent PET/CT done on 12/20/2019 did not suggest any recurrent or metastatic disease  Her Myeloma labs have been negative so far. Previous imaging has been negative for any lytic lesions  No signs of local recurrent/systemic myeloma/other plasmacytoma  Today's SPEP/FLC/MIRNA all pending. Her CBC, CMP, LDH, Immunoglobulins are normal       2. Body pain all over: c/o chronic pain in the back,  shoulders, hands and legs. She was diagnosed with fibromyalgia and currently on Cymbalta with moderate improvement. She tried gabapentin and Lyrica in the past with little help  3. Bilateral Carpal tunnel syndrome s/p surgery       Plan:     1. Her CBC, CMP, LDH, Immunoglobulins are normal. Myeloma labs are pending  2. Will schedule for PET CT in the next week    Will follow up in 3 months with labs- CBC, CMP, myeloma labs, LDH, uric acid and appoinment     Plan of care discussed with Dr. Dominic Dunlap MD PGY-6  Hematology and Oncology  Pager:[phone]

## 2020-09-15 NOTE — Clinical Note
Please schedule PET CT in 1-2 weeks  Please schedule blood work in 3 months- CBC, CMP, myeloma labs, LDH, uric acid and a follow up appt

## 2020-09-16 LAB
ALBUMIN SERPL ELPH-MCNC: 4.2 G/DL (ref 3.35–5.55)
ALPHA1 GLOB SERPL ELPH-MCNC: 0.36 G/DL (ref 0.17–0.41)
ALPHA2 GLOB SERPL ELPH-MCNC: 0.75 G/DL (ref 0.43–0.99)
B-GLOBULIN SERPL ELPH-MCNC: 0.72 G/DL (ref 0.5–1.1)
GAMMA GLOB SERPL ELPH-MCNC: 0.76 G/DL (ref 0.67–1.58)
INTERPRETATION SERPL IFE-IMP: NORMAL
KAPPA LC SER QL IA: 0.86 MG/DL (ref 0.33–1.94)
KAPPA LC/LAMBDA SER IA: 0.76 (ref 0.26–1.65)
LAMBDA LC SER QL IA: 1.13 MG/DL (ref 0.57–2.63)
PATHOLOGIST INTERPRETATION IFE: NORMAL
PATHOLOGIST INTERPRETATION SPE: NORMAL
PROT SERPL-MCNC: 6.8 G/DL (ref 6–8.4)

## 2020-09-18 DIAGNOSIS — Z01.84 ANTIBODY RESPONSE EXAMINATION: ICD-10-CM

## 2020-09-23 ENCOUNTER — HOSPITAL ENCOUNTER (OUTPATIENT)
Dept: RADIOLOGY | Facility: HOSPITAL | Age: 55
Discharge: HOME | End: 2020-09-23
Attending: STUDENT IN AN ORGANIZED HEALTH CARE EDUCATION/TRAINING PROGRAM
Payer: COMMERCIAL

## 2020-09-23 DIAGNOSIS — C90.31 SOLITARY PLASMACYTOMA IN REMISSION: ICD-10-CM

## 2020-09-23 LAB — POCT GLUCOSE: 92 MG/DL (ref 70–110)

## 2020-09-23 PROCEDURE — 78816 PET IMAGE W/CT FULL BODY: CPT | Mod: 26,PS,, | Performed by: RADIOLOGY

## 2020-09-23 PROCEDURE — 78816 PET IMAGE W/CT FULL BODY: CPT | Mod: TC

## 2020-09-23 PROCEDURE — 78816 NM PET CT WHOLE BODY: ICD-10-PCS | Mod: 26,PS,, | Performed by: RADIOLOGY

## 2020-10-18 DIAGNOSIS — Z01.84 ANTIBODY RESPONSE EXAMINATION: ICD-10-CM

## 2020-11-17 DIAGNOSIS — Z01.84 ANTIBODY RESPONSE EXAMINATION: ICD-10-CM

## 2020-12-28 ENCOUNTER — CLINICAL SUPPORT (OUTPATIENT)
Dept: URGENT CARE | Facility: CLINIC | Age: 55
End: 2020-12-28
Payer: COMMERCIAL

## 2020-12-28 DIAGNOSIS — R11.10 VOMITING, INTRACTABILITY OF VOMITING NOT SPECIFIED, PRESENCE OF NAUSEA NOT SPECIFIED, UNSPECIFIED VOMITING TYPE: ICD-10-CM

## 2020-12-28 DIAGNOSIS — R51.9 NONINTRACTABLE HEADACHE, UNSPECIFIED CHRONICITY PATTERN, UNSPECIFIED HEADACHE TYPE: ICD-10-CM

## 2020-12-28 DIAGNOSIS — R52 BODY ACHES: ICD-10-CM

## 2020-12-28 DIAGNOSIS — J02.9 SORE THROAT: ICD-10-CM

## 2020-12-28 DIAGNOSIS — R09.81 NASAL CONGESTION: ICD-10-CM

## 2020-12-28 DIAGNOSIS — R50.9 FEVER, UNSPECIFIED FEVER CAUSE: ICD-10-CM

## 2020-12-28 DIAGNOSIS — R50.9 FEVER, UNSPECIFIED FEVER CAUSE: Primary | ICD-10-CM

## 2020-12-28 LAB
CTP QC/QA: YES
SARS-COV-2 RDRP RESP QL NAA+PROBE: NEGATIVE

## 2020-12-28 PROCEDURE — U0002 COVID-19 LAB TEST NON-CDC: HCPCS | Mod: QW,S$GLB,, | Performed by: INTERNAL MEDICINE

## 2020-12-28 PROCEDURE — U0002: ICD-10-PCS | Mod: QW,S$GLB,, | Performed by: INTERNAL MEDICINE

## 2020-12-29 ENCOUNTER — TELEPHONE (OUTPATIENT)
Dept: PRIMARY CARE CLINIC | Facility: OTHER | Age: 55
End: 2020-12-29

## 2020-12-29 ENCOUNTER — TELEPHONE (OUTPATIENT)
Dept: PRIMARY CARE CLINIC | Facility: CLINIC | Age: 55
End: 2020-12-29

## 2020-12-29 NOTE — TELEPHONE ENCOUNTER
Discussed negative COVID test result - patient still having symptoms and is on her way to  Appointment now.

## 2021-02-18 ENCOUNTER — IMMUNIZATION (OUTPATIENT)
Dept: OBSTETRICS AND GYNECOLOGY | Facility: CLINIC | Age: 56
End: 2021-02-18
Payer: COMMERCIAL

## 2021-02-18 DIAGNOSIS — Z23 NEED FOR VACCINATION: Primary | ICD-10-CM

## 2021-02-18 PROCEDURE — 91300 COVID-19, MRNA, LNP-S, PF, 30 MCG/0.3 ML DOSE VACCINE: CPT | Mod: PBBFAC | Performed by: FAMILY MEDICINE

## 2021-03-05 ENCOUNTER — OFFICE VISIT (OUTPATIENT)
Dept: OPTOMETRY | Facility: CLINIC | Age: 56
End: 2021-03-05
Payer: COMMERCIAL

## 2021-03-05 DIAGNOSIS — H25.13 NUCLEAR SCLEROSIS OF BOTH EYES: ICD-10-CM

## 2021-03-05 DIAGNOSIS — H52.4 PRESBYOPIA: Primary | ICD-10-CM

## 2021-03-05 PROCEDURE — 99999 PR PBB SHADOW E&M-EST. PATIENT-LVL II: ICD-10-PCS | Mod: PBBFAC,,, | Performed by: OPTOMETRIST

## 2021-03-05 PROCEDURE — 1126F AMNT PAIN NOTED NONE PRSNT: CPT | Mod: S$GLB,,, | Performed by: OPTOMETRIST

## 2021-03-05 PROCEDURE — 92004 COMPRE OPH EXAM NEW PT 1/>: CPT | Mod: S$GLB,,, | Performed by: OPTOMETRIST

## 2021-03-05 PROCEDURE — 1126F PR PAIN SEVERITY QUANTIFIED, NO PAIN PRESENT: ICD-10-PCS | Mod: S$GLB,,, | Performed by: OPTOMETRIST

## 2021-03-05 PROCEDURE — 99999 PR PBB SHADOW E&M-EST. PATIENT-LVL II: CPT | Mod: PBBFAC,,, | Performed by: OPTOMETRIST

## 2021-03-05 PROCEDURE — 92004 PR EYE EXAM, NEW PATIENT,COMPREHESV: ICD-10-PCS | Mod: S$GLB,,, | Performed by: OPTOMETRIST

## 2021-03-11 ENCOUNTER — IMMUNIZATION (OUTPATIENT)
Dept: OBSTETRICS AND GYNECOLOGY | Facility: CLINIC | Age: 56
End: 2021-03-11
Payer: COMMERCIAL

## 2021-03-11 DIAGNOSIS — Z23 NEED FOR VACCINATION: Primary | ICD-10-CM

## 2021-03-11 PROCEDURE — 0002A COVID-19, MRNA, LNP-S, PF, 30 MCG/0.3 ML DOSE VACCINE: CPT | Mod: CV19,S$GLB,, | Performed by: FAMILY MEDICINE

## 2021-03-11 PROCEDURE — 91300 COVID-19, MRNA, LNP-S, PF, 30 MCG/0.3 ML DOSE VACCINE: ICD-10-PCS | Mod: S$GLB,,, | Performed by: FAMILY MEDICINE

## 2021-03-11 PROCEDURE — 91300 COVID-19, MRNA, LNP-S, PF, 30 MCG/0.3 ML DOSE VACCINE: CPT | Mod: S$GLB,,, | Performed by: FAMILY MEDICINE

## 2021-03-11 PROCEDURE — 0002A COVID-19, MRNA, LNP-S, PF, 30 MCG/0.3 ML DOSE VACCINE: ICD-10-PCS | Mod: CV19,S$GLB,, | Performed by: FAMILY MEDICINE

## 2021-12-02 ENCOUNTER — PATIENT MESSAGE (OUTPATIENT)
Dept: RESEARCH | Facility: HOSPITAL | Age: 56
End: 2021-12-02
Payer: COMMERCIAL

## 2021-12-23 ENCOUNTER — PATIENT MESSAGE (OUTPATIENT)
Dept: RESEARCH | Facility: HOSPITAL | Age: 56
End: 2021-12-23
Payer: COMMERCIAL

## 2022-01-11 ENCOUNTER — DOCUMENTATION ONLY (OUTPATIENT)
Dept: ADMINISTRATIVE | Facility: HOSPITAL | Age: 57
End: 2022-01-11
Payer: COMMERCIAL

## 2022-01-11 LAB
CTP QC/QA: YES
SARS-COV-2 AG RESP QL IA.RAPID: NEGATIVE

## 2022-01-23 ENCOUNTER — DOCUMENTATION ONLY (OUTPATIENT)
Dept: ADMINISTRATIVE | Facility: HOSPITAL | Age: 57
End: 2022-01-23
Payer: COMMERCIAL

## 2022-01-23 LAB
CTP QC/QA: YES
SARS-COV-2 AG RESP QL IA.RAPID: NEGATIVE

## 2022-01-23 NOTE — PROGRESS NOTES
This test utilizes isothermal nucleic acid amplification   technology to detect the SARS-CoV-2 RdRp nucleic acid segment.   The analytical sensitivity (limit of detection) is 125 genome   equivalents/mL.   A POSITIVE result implies infection with the SARS-CoV-2 virus;   the patient is presumed to be contagious.     A NEGATIVE result means that SARS-CoV-2 nucleic acids are not   present above the limit of detection. A NEGATIVE result should be   treated as presumptive. It does not rule out the possibility of   COVID-19 and should not be the sole basis for treatment decisions.   If COVID-19 is strongly suspected based on clinical and exposure   history, re-testing using an alternate molecular assay should be   considered.   This test is only for use under the Food and Drug   Administration s Emergency Use Authorization (EUA).   Commercial kits are provided by [employer].   Performance characteristics of the EUA have been independently   verified by [facility] Department of   Pathology and Laboratory Medicine.   _________________________________________________________________   The authorized Fact Sheet for Healthcare Providers and the authorized Fact   Sheet for Patients of the ID NOW COVID-19 are available on the FDA   website:     https://www.fda.gov/media/097358/download  https://www.fda.gov/media/376697/download

## 2022-03-31 ENCOUNTER — TELEPHONE (OUTPATIENT)
Dept: HEMATOLOGY/ONCOLOGY | Facility: CLINIC | Age: 57
End: 2022-03-31
Payer: COMMERCIAL

## 2022-03-31 ENCOUNTER — OFFICE VISIT (OUTPATIENT)
Dept: URGENT CARE | Facility: CLINIC | Age: 57
End: 2022-03-31
Payer: COMMERCIAL

## 2022-03-31 VITALS
DIASTOLIC BLOOD PRESSURE: 65 MMHG | WEIGHT: 180 LBS | TEMPERATURE: 98 F | BODY MASS INDEX: 33.13 KG/M2 | HEART RATE: 76 BPM | HEIGHT: 62 IN | OXYGEN SATURATION: 97 % | RESPIRATION RATE: 18 BRPM | SYSTOLIC BLOOD PRESSURE: 107 MMHG

## 2022-03-31 DIAGNOSIS — J02.9 SORE THROAT: Primary | ICD-10-CM

## 2022-03-31 DIAGNOSIS — C90.31 SOLITARY PLASMACYTOMA IN REMISSION: Primary | ICD-10-CM

## 2022-03-31 DIAGNOSIS — R50.9 FEVER, UNSPECIFIED FEVER CAUSE: ICD-10-CM

## 2022-03-31 DIAGNOSIS — J06.9 VIRAL URI: Primary | ICD-10-CM

## 2022-03-31 LAB
CTP QC/QA: YES
CTP QC/QA: YES
POC MOLECULAR INFLUENZA A AGN: NEGATIVE
POC MOLECULAR INFLUENZA B AGN: NEGATIVE
SARS-COV-2 AG RESP QL IA.RAPID: NEGATIVE

## 2022-03-31 PROCEDURE — 3008F PR BODY MASS INDEX (BMI) DOCUMENTED: ICD-10-PCS | Mod: CPTII,S$GLB,, | Performed by: PHYSICIAN ASSISTANT

## 2022-03-31 PROCEDURE — 1160F PR REVIEW ALL MEDS BY PRESCRIBER/CLIN PHARMACIST DOCUMENTED: ICD-10-PCS | Mod: CPTII,S$GLB,, | Performed by: PHYSICIAN ASSISTANT

## 2022-03-31 PROCEDURE — 3008F BODY MASS INDEX DOCD: CPT | Mod: CPTII,S$GLB,, | Performed by: PHYSICIAN ASSISTANT

## 2022-03-31 PROCEDURE — 99203 PR OFFICE/OUTPT VISIT, NEW, LEVL III, 30-44 MIN: ICD-10-PCS | Mod: S$GLB,,, | Performed by: PHYSICIAN ASSISTANT

## 2022-03-31 PROCEDURE — 87502 INFLUENZA DNA AMP PROBE: CPT | Mod: QW,S$GLB,, | Performed by: PHYSICIAN ASSISTANT

## 2022-03-31 PROCEDURE — 1159F MED LIST DOCD IN RCRD: CPT | Mod: CPTII,S$GLB,, | Performed by: PHYSICIAN ASSISTANT

## 2022-03-31 PROCEDURE — 3078F DIAST BP <80 MM HG: CPT | Mod: CPTII,S$GLB,, | Performed by: PHYSICIAN ASSISTANT

## 2022-03-31 PROCEDURE — 3074F SYST BP LT 130 MM HG: CPT | Mod: CPTII,S$GLB,, | Performed by: PHYSICIAN ASSISTANT

## 2022-03-31 PROCEDURE — 87502 POCT INFLUENZA A/B MOLECULAR: ICD-10-PCS | Mod: QW,S$GLB,, | Performed by: PHYSICIAN ASSISTANT

## 2022-03-31 PROCEDURE — 1159F PR MEDICATION LIST DOCUMENTED IN MEDICAL RECORD: ICD-10-PCS | Mod: CPTII,S$GLB,, | Performed by: PHYSICIAN ASSISTANT

## 2022-03-31 PROCEDURE — 99203 OFFICE O/P NEW LOW 30 MIN: CPT | Mod: S$GLB,,, | Performed by: PHYSICIAN ASSISTANT

## 2022-03-31 PROCEDURE — 1160F RVW MEDS BY RX/DR IN RCRD: CPT | Mod: CPTII,S$GLB,, | Performed by: PHYSICIAN ASSISTANT

## 2022-03-31 PROCEDURE — 3078F PR MOST RECENT DIASTOLIC BLOOD PRESSURE < 80 MM HG: ICD-10-PCS | Mod: CPTII,S$GLB,, | Performed by: PHYSICIAN ASSISTANT

## 2022-03-31 PROCEDURE — 3074F PR MOST RECENT SYSTOLIC BLOOD PRESSURE < 130 MM HG: ICD-10-PCS | Mod: CPTII,S$GLB,, | Performed by: PHYSICIAN ASSISTANT

## 2022-03-31 RX ORDER — PANTOPRAZOLE SODIUM 40 MG/1
40 TABLET, DELAYED RELEASE ORAL EVERY MORNING
COMMUNITY
Start: 2022-02-10 | End: 2022-10-03

## 2022-03-31 NOTE — PROGRESS NOTES
"Subjective:       Patient ID: Mary Blair is a 56 y.o. female.    Vitals:  height is 5' 2" (1.575 m) and weight is 81.6 kg (180 lb). Her temporal temperature is 97.5 °F (36.4 °C). Her blood pressure is 107/65 and her pulse is 76. Her respiration is 18 and oxygen saturation is 97%.     Chief Complaint: Cough    Pt was told to get covid and flu test because she was running fever last night.  Patient states she has some mild congestion, but states she suffers with seasonal allergies.  Patient also states that is not uncommon for her to run a fever in the evenings or at night.  She is currently in treatment for multiple myeloma and needs testing before she can go for her oncology. appointment today.  She took a covid test at [facility] today, which was negative.    Cough  This is a new problem. Episode onset: 2 days ago. The problem has been waxing and waning. The cough is non-productive. Associated symptoms include a fever (102.0 highest temp) and a sore throat. Pertinent negatives include no chest pain, myalgias or rash. Associated symptoms comments: Worse at night. The symptoms are aggravated by lying down. She has tried nothing for the symptoms. Her past medical history is significant for environmental allergies. There is no history of asthma, bronchitis or pneumonia.       Constitution: Positive for fever (102.0 highest temp).   HENT: Positive for congestion and sore throat. Negative for sinus pain and sinus pressure.    Cardiovascular: Negative for chest pain.   Respiratory: Positive for cough. Negative for sputum production.    Musculoskeletal: Negative for muscle ache.   Skin: Negative for rash.   Allergic/Immunologic: Positive for environmental allergies.   Neurological: Negative for dizziness and light-headedness.       Objective:      Physical Exam   Constitutional: She is oriented to person, place, and time. She appears well-developed. No distress.   HENT:   Head: Normocephalic and " atraumatic.   Ears:   Right Ear: Hearing, tympanic membrane, external ear and ear canal normal.   Left Ear: Hearing, tympanic membrane, external ear and ear canal normal.   Nose: Nose normal. Right sinus exhibits no maxillary sinus tenderness and no frontal sinus tenderness. Left sinus exhibits no maxillary sinus tenderness and no frontal sinus tenderness.   Mouth/Throat: Uvula is midline and oropharynx is clear and moist.   Eyes: Conjunctivae are normal.   Cardiovascular: Normal rate, regular rhythm and normal heart sounds.   No murmur heard.Exam reveals no gallop and no friction rub.   Pulmonary/Chest: Effort normal and breath sounds normal. No respiratory distress. She has no wheezes.   Musculoskeletal: Normal range of motion.         General: No tenderness. Normal range of motion.   Neurological: She is alert and oriented to person, place, and time.   Skin: Skin is warm, dry and not diaphoretic.   Psychiatric: Her behavior is normal. Judgment and thought content normal.   Nursing note and vitals reviewed.        Results for orders placed or performed in visit on 03/31/22   POCT Influenza A/B MOLECULAR   Result Value Ref Range    POC Molecular Influenza A Ag Negative Negative, Not Reported    POC Molecular Influenza B Ag Negative Negative, Not Reported     Acceptable Yes        Assessment:       1. Viral URI          Plan:         Viral URI  -     POCT Influenza A/B MOLECULAR                 Patient Instructions   - Rest.  - Drink plenty of fluids.  - Take Tylenol and/or Ibuprofen as directed as needed for fever/pain.  Do not take more than the recommended dose.  - follow up with your PCP within the next 1-2 weeks as needed.   - Take over-the-counter claritin, zyrtec, allegra, or xyzal as directed.  You should NOT use a decongestant form (D) of this medication if you have a history of hypertension or heart disease.   - Use Ocean Spray Nasal Saline 1-3 puffs each nostril every 2-3 hours then blow  out onto tissue. This is to irrigate the nasal passage way to clear the sinus openings. Use until sinus problem resolved.   - You must understand that you have received an Urgent Care treatment only and that you may be released before all of your medical problems are known or treated.   - You, the patient, will arrange for follow up care as instructed.   - If your condition worsens or fails to improve we recommend that you receive another evaluation at the ER immediately or contact your PCP to discuss your concerns.   - You can call [phone] or [phone] to help schedule an appointment with the appropriate provider.

## 2022-03-31 NOTE — TELEPHONE ENCOUNTER
"----- Message from Mary Busch sent at 3/31/2022  8:02 AM CDT -----  Regarding: Appt  Contact: Mary  Reschedule Existing Appointment      Appt Date: 03/31/22    Type of appt : Follow up    Physician: Stephanie    Reason for rescheduling? Pt not feeling well.    Caller: Mary    Contact Preference: [phone]         Additional Information:  "Thank you for all that you do for our patients'"      "

## 2022-03-31 NOTE — PATIENT INSTRUCTIONS
- Rest.  - Drink plenty of fluids.  - Take Tylenol and/or Ibuprofen as directed as needed for fever/pain.  Do not take more than the recommended dose.  - follow up with your PCP within the next 1-2 weeks as needed.   - Take over-the-counter claritin, zyrtec, allegra, or xyzal as directed.  You should NOT use a decongestant form (D) of this medication if you have a history of hypertension or heart disease.   - Use Ocean Spray Nasal Saline 1-3 puffs each nostril every 2-3 hours then blow out onto tissue. This is to irrigate the nasal passage way to clear the sinus openings. Use until sinus problem resolved.   - You must understand that you have received an Urgent Care treatment only and that you may be released before all of your medical problems are known or treated.   - You, the patient, will arrange for follow up care as instructed.   - If your condition worsens or fails to improve we recommend that you receive another evaluation at the ER immediately or contact your PCP to discuss your concerns.   - You can call [phone] or [phone] to help schedule an appointment with the appropriate provider.

## 2022-03-31 NOTE — TELEPHONE ENCOUNTER
Spoke to  who stated that she tested negative for both flu and Covid on 3/31/22. She stated that she would like an earlier appointment than 4/8/22. She verbalized agreement to new appointment time, date, and location on 4/8/22 with  and labs.  was advised of test results and scheduled appointment through secure chat.

## 2022-03-31 NOTE — TELEPHONE ENCOUNTER
Spoke with Ms.Johnnie regarding her appointment scheduled on 3/31/22 with . She stated that she has a fever of 102F, sore throat, sneezing, congestion, fatigue, and woke up with sweats. She was advised to do a home test for COVID and if that was negative to get swabbed for the flu at her local urgent care. She was given the clinic callback number to advise us of results. Appointment will be rescheduled at that time.

## 2022-04-06 ENCOUNTER — OFFICE VISIT (OUTPATIENT)
Dept: HEMATOLOGY/ONCOLOGY | Facility: CLINIC | Age: 57
End: 2022-04-06
Payer: COMMERCIAL

## 2022-04-06 ENCOUNTER — LAB VISIT (OUTPATIENT)
Dept: LAB | Facility: HOSPITAL | Age: 57
End: 2022-04-06
Attending: INTERNAL MEDICINE
Payer: COMMERCIAL

## 2022-04-06 VITALS
HEIGHT: 62 IN | SYSTOLIC BLOOD PRESSURE: 115 MMHG | RESPIRATION RATE: 18 BRPM | HEART RATE: 72 BPM | DIASTOLIC BLOOD PRESSURE: 58 MMHG | BODY MASS INDEX: 35.88 KG/M2 | TEMPERATURE: 99 F | WEIGHT: 195 LBS | OXYGEN SATURATION: 97 %

## 2022-04-06 DIAGNOSIS — C90.31 SOLITARY PLASMACYTOMA IN REMISSION: Primary | ICD-10-CM

## 2022-04-06 DIAGNOSIS — C90.31 SOLITARY PLASMACYTOMA IN REMISSION: ICD-10-CM

## 2022-04-06 LAB
ALBUMIN SERPL BCP-MCNC: 3.8 G/DL (ref 3.5–5.2)
ALP SERPL-CCNC: 85 U/L (ref 55–135)
ALT SERPL W/O P-5'-P-CCNC: 23 U/L (ref 10–44)
ANION GAP SERPL CALC-SCNC: 9 MMOL/L (ref 8–16)
AST SERPL-CCNC: 19 U/L (ref 10–40)
BASOPHILS # BLD AUTO: 0.05 K/UL (ref 0–0.2)
BASOPHILS NFR BLD: 0.9 % (ref 0–1.9)
BILIRUB SERPL-MCNC: 0.3 MG/DL (ref 0.1–1)
BUN SERPL-MCNC: 12 MG/DL (ref 6–20)
CALCIUM SERPL-MCNC: 9.1 MG/DL (ref 8.7–10.5)
CHLORIDE SERPL-SCNC: 105 MMOL/L (ref 95–110)
CO2 SERPL-SCNC: 26 MMOL/L (ref 23–29)
CREAT SERPL-MCNC: 0.9 MG/DL (ref 0.5–1.4)
DIFFERENTIAL METHOD: NORMAL
EOSINOPHIL # BLD AUTO: 0.1 K/UL (ref 0–0.5)
EOSINOPHIL NFR BLD: 2.2 % (ref 0–8)
ERYTHROCYTE [DISTWIDTH] IN BLOOD BY AUTOMATED COUNT: 13.2 % (ref 11.5–14.5)
EST. GFR  (AFRICAN AMERICAN): >60 ML/MIN/1.73 M^2
EST. GFR  (NON AFRICAN AMERICAN): >60 ML/MIN/1.73 M^2
GLUCOSE SERPL-MCNC: 79 MG/DL (ref 70–110)
HCT VFR BLD AUTO: 40.2 % (ref 37–48.5)
HGB BLD-MCNC: 13 G/DL (ref 12–16)
IGA SERPL-MCNC: 75 MG/DL (ref 40–350)
IGG SERPL-MCNC: 832 MG/DL (ref 650–1600)
IGM SERPL-MCNC: 119 MG/DL (ref 50–300)
IMM GRANULOCYTES # BLD AUTO: 0.01 K/UL (ref 0–0.04)
IMM GRANULOCYTES NFR BLD AUTO: 0.2 % (ref 0–0.5)
LDH SERPL L TO P-CCNC: 195 U/L (ref 110–260)
LYMPHOCYTES # BLD AUTO: 1.8 K/UL (ref 1–4.8)
LYMPHOCYTES NFR BLD: 33.5 % (ref 18–48)
MCH RBC QN AUTO: 30.4 PG (ref 27–31)
MCHC RBC AUTO-ENTMCNC: 32.3 G/DL (ref 32–36)
MCV RBC AUTO: 94 FL (ref 82–98)
MONOCYTES # BLD AUTO: 0.6 K/UL (ref 0.3–1)
MONOCYTES NFR BLD: 10.4 % (ref 4–15)
NEUTROPHILS # BLD AUTO: 2.8 K/UL (ref 1.8–7.7)
NEUTROPHILS NFR BLD: 52.8 % (ref 38–73)
NRBC BLD-RTO: 0 /100 WBC
PLATELET # BLD AUTO: 237 K/UL (ref 150–450)
PMV BLD AUTO: 9.6 FL (ref 9.2–12.9)
POTASSIUM SERPL-SCNC: 4.3 MMOL/L (ref 3.5–5.1)
PROT SERPL-MCNC: 6.7 G/DL (ref 6–8.4)
RBC # BLD AUTO: 4.28 M/UL (ref 4–5.4)
SODIUM SERPL-SCNC: 140 MMOL/L (ref 136–145)
URATE SERPL-MCNC: 4.8 MG/DL (ref 2.4–5.7)
WBC # BLD AUTO: 5.37 K/UL (ref 3.9–12.7)

## 2022-04-06 PROCEDURE — 86334 IMMUNOFIX E-PHORESIS SERUM: CPT | Performed by: INTERNAL MEDICINE

## 2022-04-06 PROCEDURE — 3078F PR MOST RECENT DIASTOLIC BLOOD PRESSURE < 80 MM HG: ICD-10-PCS | Mod: CPTII,S$GLB,, | Performed by: NURSE PRACTITIONER

## 2022-04-06 PROCEDURE — 1160F RVW MEDS BY RX/DR IN RCRD: CPT | Mod: CPTII,S$GLB,, | Performed by: NURSE PRACTITIONER

## 2022-04-06 PROCEDURE — 83615 LACTATE (LD) (LDH) ENZYME: CPT | Performed by: INTERNAL MEDICINE

## 2022-04-06 PROCEDURE — 3078F DIAST BP <80 MM HG: CPT | Mod: CPTII,S$GLB,, | Performed by: NURSE PRACTITIONER

## 2022-04-06 PROCEDURE — 3008F PR BODY MASS INDEX (BMI) DOCUMENTED: ICD-10-PCS | Mod: CPTII,S$GLB,, | Performed by: NURSE PRACTITIONER

## 2022-04-06 PROCEDURE — 1159F PR MEDICATION LIST DOCUMENTED IN MEDICAL RECORD: ICD-10-PCS | Mod: CPTII,S$GLB,, | Performed by: NURSE PRACTITIONER

## 2022-04-06 PROCEDURE — 86334 IMMUNOFIX E-PHORESIS SERUM: CPT | Mod: 26,,, | Performed by: PATHOLOGY

## 2022-04-06 PROCEDURE — 84165 PROTEIN E-PHORESIS SERUM: CPT | Performed by: INTERNAL MEDICINE

## 2022-04-06 PROCEDURE — 1159F MED LIST DOCD IN RCRD: CPT | Mod: CPTII,S$GLB,, | Performed by: NURSE PRACTITIONER

## 2022-04-06 PROCEDURE — 99214 PR OFFICE/OUTPT VISIT, EST, LEVL IV, 30-39 MIN: ICD-10-PCS | Mod: S$GLB,,, | Performed by: NURSE PRACTITIONER

## 2022-04-06 PROCEDURE — 99214 OFFICE O/P EST MOD 30 MIN: CPT | Mod: S$GLB,,, | Performed by: NURSE PRACTITIONER

## 2022-04-06 PROCEDURE — 82784 ASSAY IGA/IGD/IGG/IGM EACH: CPT | Performed by: INTERNAL MEDICINE

## 2022-04-06 PROCEDURE — 84165 PROTEIN E-PHORESIS SERUM: CPT | Mod: 26,,, | Performed by: PATHOLOGY

## 2022-04-06 PROCEDURE — 3074F SYST BP LT 130 MM HG: CPT | Mod: CPTII,S$GLB,, | Performed by: NURSE PRACTITIONER

## 2022-04-06 PROCEDURE — 84550 ASSAY OF BLOOD/URIC ACID: CPT | Performed by: INTERNAL MEDICINE

## 2022-04-06 PROCEDURE — 1160F PR REVIEW ALL MEDS BY PRESCRIBER/CLIN PHARMACIST DOCUMENTED: ICD-10-PCS | Mod: CPTII,S$GLB,, | Performed by: NURSE PRACTITIONER

## 2022-04-06 PROCEDURE — 80053 COMPREHEN METABOLIC PANEL: CPT | Performed by: INTERNAL MEDICINE

## 2022-04-06 PROCEDURE — 83520 IMMUNOASSAY QUANT NOS NONAB: CPT | Mod: 59 | Performed by: INTERNAL MEDICINE

## 2022-04-06 PROCEDURE — 86334 PATHOLOGIST INTERPRETATION IFE: ICD-10-PCS | Mod: 26,,, | Performed by: PATHOLOGY

## 2022-04-06 PROCEDURE — 3008F BODY MASS INDEX DOCD: CPT | Mod: CPTII,S$GLB,, | Performed by: NURSE PRACTITIONER

## 2022-04-06 PROCEDURE — 3074F PR MOST RECENT SYSTOLIC BLOOD PRESSURE < 130 MM HG: ICD-10-PCS | Mod: CPTII,S$GLB,, | Performed by: NURSE PRACTITIONER

## 2022-04-06 PROCEDURE — 36415 COLL VENOUS BLD VENIPUNCTURE: CPT | Performed by: INTERNAL MEDICINE

## 2022-04-06 PROCEDURE — 84165 PATHOLOGIST INTERPRETATION SPE: ICD-10-PCS | Mod: 26,,, | Performed by: PATHOLOGY

## 2022-04-06 PROCEDURE — 85025 COMPLETE CBC W/AUTO DIFF WBC: CPT | Performed by: INTERNAL MEDICINE

## 2022-04-06 NOTE — PROGRESS NOTES
"  PATIENT: Mary Blair  MRN: [id number]  DATE: 4/6/2022      Diagnosis:   1. Solitary plasmacytoma in remission        Chief Complaint: Follow-up (Plasmacytoma )    Mary Blair is a 53M with PMHx of plasmacytoma of the lumbar spine previously treated at [facility] with plasmacytoma presented to our hematology clinic as a follow up    Follow up 9/15/2020  - C/o chronic back pain much worse than before, neck/shoulder pain, elbow, back pain, ankles all over. Seeing a spine surgeon Dr. Vieyra and had MRI in 6/2020 which revealed mild disc prolapse in lumbar disc  - c/o feeling cold mostly evening, but never had temperature >100.4. No weight loss, vomiting, headaches, diarrhea or bleeding issues  - Currently on cymbalta for fibromyalgia and Tizanidine for back spasms  - Her myeloma labs SPEP, Immunoglobulins, free LT chains, MIRNA are pending  - 12/20/2019- No FDG PET/CT findings to suggest recurrent or metastatic disease    Hematology History   She initially presented with back pain in early February of 2018 and underwent bone scan on 2/27/2018 which revealed Increased uptake within L3 body corresponding to lytic lesion seen on 2/16/18 CT and underwent back surgery on 2/28/2018. A bone marrow biopsy done on 3/8/2018 was negative for any malignancy. However the biopsy of L3 vertebral body lesion revealed lambda restricted plasma cell neoplasm and skeletal survey done on 3/21/2018 revealed "Periprosthetic lucency associated with the left proximal femur potentially reflecting osteolysis rather than malignancy". She underwent ratiation threapy to L3, L4 and L5 of spine from March to May 8, 2018 for a total of 8 weeks due to suspicion for invasion of adjacent vertebrae.      4/11/2018 xray of L-spine revealed no suspicious osseous lesions and intact posterior spinal fusion L2-L4.  4/13/2018 - MIRNA + UPEP- Normal immunofixation pattern  4/26/2018 - CT abdo/pelvis- NO suspicious osseous lesions identified  4/26/2018 - CT " Thoracic Spine W/WO - No suspicious osseous lesions identified.    2018 - B2 microglobulin:  1.8 mg/L (ref 0.6-2.4). CBC, CMP unremarkable   - Immunoglobulins: Normal   - LDH: 178 ()    - SPEP- Total protein normal.  Apparent normal immunofixation pattern. Normal FLC ratio     18 - MRI C-spine WO - No focal lesions observed  18 - MRI L-Spine W/WO - No focal lesions observed  18 - MRI brain W/Wo- Normal study  10/24/2018 -  Normal immunoglobulins, LDH. Normal SPEP and MIRNA. No M spike seen. Normal FLC ratio     She c/o chronic back pain, denies any new complaints.      She has PMHx of L hip replacements in  and , B/L carpal tunnel s/p surgery, R rotator cuff tear and trigger finger. She is a non smoker and drinks alcohol socially     She has family history of skin cancer and lung in grand mother      Subjective:    Initial History: Ms. Blair is a 56 y.o. female who returns for follow up.  Missed visits previously. Reported multiple chronic issues including joint pain, fatigue.    Past Medical History:   Past Medical History:   Diagnosis Date    Cancer     Plasmacytoma     Rotator cuff disorder     Spinal stenosis        Past Surgical HIstory:   Past Surgical History:   Procedure Laterality Date     SECTION      corpal tunnel      JOINT REPLACEMENT      marquisear mojgan      ROTATOR CUFF REPAIR      TONSILLECTOMY      TOTAL HIP ARTHROPLASTY         Family History:   Family History   Problem Relation Age of Onset    Heart disease Mother     Heart disease Father     No Known Problems Sister     No Known Problems Brother     No Known Problems Maternal Aunt     No Known Problems Maternal Uncle     No Known Problems Paternal Aunt     No Known Problems Paternal Uncle     Cataracts Maternal Grandmother     No Known Problems Maternal Grandfather     No Known Problems Paternal Grandmother     No Known Problems Paternal Grandfather     Amblyopia Neg Hx     Blindness  "Neg Hx     Cancer Neg Hx     Diabetes Neg Hx     Glaucoma Neg Hx     Hypertension Neg Hx     Macular degeneration Neg Hx     Retinal detachment Neg Hx     Strabismus Neg Hx     Stroke Neg Hx     Thyroid disease Neg Hx        Social History:  reports that she has never smoked. She has never used smokeless tobacco. She reports current alcohol use. She reports that she does not use drugs.    Allergies:  Review of patient's allergies indicates:  No Known Allergies    Medications:  Current Outpatient Medications   Medication Sig Dispense Refill    DULoxetine (CYMBALTA) 60 MG capsule Take 60 mg by mouth once daily.       pantoprazole (PROTONIX) 40 MG tablet Take 40 mg by mouth every morning.      conj estrogens-bazedoxifene (DUAVEE) 0.45-20 mg Tab Take 1 tablet by mouth once daily.        No current facility-administered medications for this visit.       Review of Systems   Constitutional: Negative for appetite change, chills, diaphoresis and fever.   HENT: Negative for nosebleeds and trouble swallowing.    Respiratory: Negative for cough and shortness of breath.    Cardiovascular: Negative for chest pain.   Gastrointestinal: Negative for abdominal pain, blood in stool, diarrhea, nausea and vomiting.   Genitourinary: Negative for hematuria.   Musculoskeletal: Positive for back pain and neck pain.   Skin: Negative for rash.   Neurological: Negative for seizures, syncope and headaches.   Hematological: Negative for adenopathy.   Psychiatric/Behavioral: Negative for agitation and behavioral problems. The patient is nervous/anxious.        ECOG Performance Status: 1   Objective:      Vitals:   Vitals:    04/06/22 0851   BP: (!) 115/58   BP Location: Left arm   Patient Position: Sitting   BP Method: Large (Automatic)   Pulse: 72   Resp: 18   Temp: 98.5 °F (36.9 °C)   TempSrc: Oral   SpO2: 97%   Weight: 88.5 kg (195 lb)   Height: 5' 2" (1.575 m)     BMI: Body mass index is 35.67 kg/m².    Physical " Exam  Constitutional:       Appearance: Normal appearance.   HENT:      Head: Normocephalic and atraumatic.      Nose: Nose normal.      Mouth/Throat:      Mouth: Mucous membranes are moist.   Eyes:      Extraocular Movements: Extraocular movements intact.      Pupils: Pupils are equal, round, and reactive to light.   Cardiovascular:      Rate and Rhythm: Normal rate and regular rhythm.   Pulmonary:      Effort: Pulmonary effort is normal.      Breath sounds: Normal breath sounds.   Abdominal:      General: Abdomen is flat. Bowel sounds are normal.      Palpations: Abdomen is soft.   Musculoskeletal:         General: No swelling. Normal range of motion.      Cervical back: Normal range of motion and neck supple.   Skin:     General: Skin is warm and dry.      Capillary Refill: Capillary refill takes less than 2 seconds.   Neurological:      General: No focal deficit present.      Mental Status: She is alert and oriented to person, place, and time.   Psychiatric:         Mood and Affect: Mood normal.           Laboratory Data:  Lab Visit on 04/06/2022   Component Date Value Ref Range Status    WBC 04/06/2022 5.37  3.90 - 12.70 K/uL Final    RBC 04/06/2022 4.28  4.00 - 5.40 M/uL Final    Hemoglobin 04/06/2022 13.0  12.0 - 16.0 g/dL Final    Hematocrit 04/06/2022 40.2  37.0 - 48.5 % Final    MCV 04/06/2022 94  82 - 98 fL Final    MCH 04/06/2022 30.4  27.0 - 31.0 pg Final    MCHC 04/06/2022 32.3  32.0 - 36.0 g/dL Final    RDW 04/06/2022 13.2  11.5 - 14.5 % Final    Platelets 04/06/2022 237  150 - 450 K/uL Final    MPV 04/06/2022 9.6  9.2 - 12.9 fL Final    Immature Granulocytes 04/06/2022 0.2  0.0 - 0.5 % Final    Gran # (ANC) 04/06/2022 2.8  1.8 - 7.7 K/uL Final    Immature Grans (Abs) 04/06/2022 0.01  0.00 - 0.04 K/uL Final    Comment: Mild elevation in immature granulocytes is non specific and   can be seen in a variety of conditions including stress response,   acute inflammation, trauma and pregnancy.  Correlation with other   laboratory and clinical findings is essential.      Lymph # 04/06/2022 1.8  1.0 - 4.8 K/uL Final    Mono # 04/06/2022 0.6  0.3 - 1.0 K/uL Final    Eos # 04/06/2022 0.1  0.0 - 0.5 K/uL Final    Baso # 04/06/2022 0.05  0.00 - 0.20 K/uL Final    nRBC 04/06/2022 0  0 /100 WBC Final    Gran % 04/06/2022 52.8  38.0 - 73.0 % Final    Lymph % 04/06/2022 33.5  18.0 - 48.0 % Final    Mono % 04/06/2022 10.4  4.0 - 15.0 % Final    Eosinophil % 04/06/2022 2.2  0.0 - 8.0 % Final    Basophil % 04/06/2022 0.9  0.0 - 1.9 % Final    Differential Method 04/06/2022 Automated   Final    Sodium 04/06/2022 140  136 - 145 mmol/L Final    Potassium 04/06/2022 4.3  3.5 - 5.1 mmol/L Final    Chloride 04/06/2022 105  95 - 110 mmol/L Final    CO2 04/06/2022 26  23 - 29 mmol/L Final    Glucose 04/06/2022 79  70 - 110 mg/dL Final    BUN 04/06/2022 12  6 - 20 mg/dL Final    Creatinine 04/06/2022 0.9  0.5 - 1.4 mg/dL Final    Calcium 04/06/2022 9.1  8.7 - 10.5 mg/dL Final    Total Protein 04/06/2022 6.7  6.0 - 8.4 g/dL Final    Albumin 04/06/2022 3.8  3.5 - 5.2 g/dL Final    Total Bilirubin 04/06/2022 0.3  0.1 - 1.0 mg/dL Final    Comment: For infants and newborns, interpretation of results should be based  on gestational age, weight and in agreement with clinical  observations.    Premature Infant recommended reference ranges:  Up to 24 hours.............<8.0 mg/dL  Up to 48 hours............<12.0 mg/dL  3-5 days..................<15.0 mg/dL  6-29 days.................<15.0 mg/dL      Alkaline Phosphatase 04/06/2022 85  55 - 135 U/L Final    AST 04/06/2022 19  10 - 40 U/L Final    ALT 04/06/2022 23  10 - 44 U/L Final    Anion Gap 04/06/2022 9  8 - 16 mmol/L Final    eGFR if African American 04/06/2022 >60.0  >60 mL/min/1.73 m^2 Final    eGFR if non African American 04/06/2022 >60.0  >60 mL/min/1.73 m^2 Final    Comment: Calculation used to obtain the estimated glomerular filtration  rate (eGFR)  is the CKD-EPI equation.       LD 04/06/2022 195  110 - 260 U/L Final    Results are increased in hemolyzed samples.    Uric Acid 04/06/2022 4.8  2.4 - 5.7 mg/dL Final    IgG 04/06/2022 832  650 - 1600 mg/dL Final    IgG Cord Blood Reference Range: 650-1600 mg/dL.    IgA 04/06/2022 75  40 - 350 mg/dL Final    IgA Cord Blood Reference Range: <5 mg/dL.    IgM 04/06/2022 119  50 - 300 mg/dL Final    IgM Cord Blood Reference Range: <25 mg/dL.    Protein, Serum 04/06/2022 6.6  6.0 - 8.4 g/dL Final    Comment: Serum protein electrophoresis and immunofixation results should be   interpreted in clinical context in that some therapeutic agents can   result   in false positive results (example, daratumumab). Correlation with   the   patient s therapeutic regimen is required.     Office Visit on 03/31/2022   Component Date Value Ref Range Status    POC Molecular Influenza A Ag 03/31/2022 Negative  Negative, Not Reported Final    POC Molecular Influenza B Ag 03/31/2022 Negative  Negative, Not Reported Final     Acceptable 03/31/2022 Yes   Final   Orders Only on 03/31/2022   Component Date Value Ref Range Status    SARS Coronavirus 2 Antigen 03/31/2022 Negative  Negative Final     Acceptable 03/31/2022 Yes   Final         Imaging:       PET/CT 12/20/2019  1.  No definite evidence of active osseous disease.  In this patient with L3 plasmacytoma status post radiation therapy, there is mild nonspecific uptake about the L3 vertebroplasty cement favored to represent postoperative inflammation status post removal of lumbar fusion hardware.  Attention on follow-up recommended.    2.  No evidence of extramedullary disease.  Mild soft tissue thickening overlying the posterior paraspinous musculature at the L2-L4 level status post removal of hardware.  Mild tracer uptake within this region compatible with soft tissue infectious/noninfectious inflammation.    Assessment:       1. Solitary  plasmacytoma in remission      1. Solitary Plasmacytoma of vertebrae s/p definite radiation treatments to L3 to L5 in May 2018. Her recent PET/CT done on 12/20/2019 did not suggest any recurrent or metastatic disease  Her Myeloma labs have been negative so far. Previous imaging has been negative for any lytic lesions  No signs of local recurrent/systemic myeloma/other plasmacytoma  Today's SPEP/FLC/MIRNA all pending. Her CBC, CMP, LDH, Immunoglobulins are normal       2. Body pain all over: c/o chronic pain in the back, shoulders, hands and legs. She was diagnosed with fibromyalgia and currently on Cymbalta with moderate improvement. She tried gabapentin and Lyrica in the past with little help  3. Bilateral Carpal tunnel syndrome s/p surgery       Plan:     1. Her CBC, CMP, LDH, Immunoglobulins are normal. Myeloma labs are pending  2. Will schedule for PET CT in the next week  3. Rheumatology referral for joint pain - per patient not controlled with supportive measures  4. Right kidney lesion - F/u with nephrologist on 04/08/22  5. GI issues- Upper endoscopy/colonoscopy done on 03/22. Removed multiple benign polyp      BMT Chart Routing      Follow up with physician 6 months. /AMANDA   Follow up with AMANDA    Labs    Lab interval:  Rheumatology referal in place. MRI orders in   Imaging MRI    CBC, CMP, myeloma labs, LDH, uric acid and appoinment   Pharmacy appointment    Other referrals Additional referrals needed            Carol Ann Allen NP  Hematology/Oncology/BMT

## 2022-04-07 LAB
ALBUMIN SERPL ELPH-MCNC: 4.11 G/DL (ref 3.35–5.55)
ALPHA1 GLOB SERPL ELPH-MCNC: 0.34 G/DL (ref 0.17–0.41)
ALPHA2 GLOB SERPL ELPH-MCNC: 0.69 G/DL (ref 0.43–0.99)
B-GLOBULIN SERPL ELPH-MCNC: 0.71 G/DL (ref 0.5–1.1)
GAMMA GLOB SERPL ELPH-MCNC: 0.75 G/DL (ref 0.67–1.58)
INTERPRETATION SERPL IFE-IMP: NORMAL
KAPPA LC SER QL IA: 1.28 MG/DL (ref 0.33–1.94)
KAPPA LC/LAMBDA SER IA: 0.58 (ref 0.26–1.65)
LAMBDA LC SER QL IA: 2.21 MG/DL (ref 0.57–2.63)
PATHOLOGIST INTERPRETATION IFE: NORMAL
PATHOLOGIST INTERPRETATION SPE: NORMAL
PROT SERPL-MCNC: 6.6 G/DL (ref 6–8.4)

## 2022-05-12 ENCOUNTER — PATIENT MESSAGE (OUTPATIENT)
Dept: HEMATOLOGY/ONCOLOGY | Facility: CLINIC | Age: 57
End: 2022-05-12
Payer: COMMERCIAL

## 2022-05-12 ENCOUNTER — OFFICE VISIT (OUTPATIENT)
Dept: URGENT CARE | Facility: CLINIC | Age: 57
End: 2022-05-12
Payer: COMMERCIAL

## 2022-05-12 VITALS
OXYGEN SATURATION: 98 % | HEART RATE: 81 BPM | BODY MASS INDEX: 34.41 KG/M2 | RESPIRATION RATE: 16 BRPM | SYSTOLIC BLOOD PRESSURE: 119 MMHG | TEMPERATURE: 99 F | HEIGHT: 62 IN | DIASTOLIC BLOOD PRESSURE: 59 MMHG | WEIGHT: 187 LBS

## 2022-05-12 DIAGNOSIS — U07.1 COVID-19: ICD-10-CM

## 2022-05-12 DIAGNOSIS — U07.1 COVID-19 VIRUS INFECTION: Primary | ICD-10-CM

## 2022-05-12 DIAGNOSIS — U07.1 COVID-19: Primary | ICD-10-CM

## 2022-05-12 LAB
CTP QC/QA: YES
SARS-COV-2 RDRP RESP QL NAA+PROBE: POSITIVE

## 2022-05-12 PROCEDURE — 99213 PR OFFICE/OUTPT VISIT, EST, LEVL III, 20-29 MIN: ICD-10-PCS | Mod: S$GLB,,,

## 2022-05-12 PROCEDURE — 3078F PR MOST RECENT DIASTOLIC BLOOD PRESSURE < 80 MM HG: ICD-10-PCS | Mod: CPTII,S$GLB,,

## 2022-05-12 PROCEDURE — 3074F SYST BP LT 130 MM HG: CPT | Mod: CPTII,S$GLB,,

## 2022-05-12 PROCEDURE — U0002: ICD-10-PCS | Mod: QW,S$GLB,,

## 2022-05-12 PROCEDURE — 3008F PR BODY MASS INDEX (BMI) DOCUMENTED: ICD-10-PCS | Mod: CPTII,S$GLB,,

## 2022-05-12 PROCEDURE — 3078F DIAST BP <80 MM HG: CPT | Mod: CPTII,S$GLB,,

## 2022-05-12 PROCEDURE — 1159F PR MEDICATION LIST DOCUMENTED IN MEDICAL RECORD: ICD-10-PCS | Mod: CPTII,S$GLB,,

## 2022-05-12 PROCEDURE — 1159F MED LIST DOCD IN RCRD: CPT | Mod: CPTII,S$GLB,,

## 2022-05-12 PROCEDURE — 1160F PR REVIEW ALL MEDS BY PRESCRIBER/CLIN PHARMACIST DOCUMENTED: ICD-10-PCS | Mod: CPTII,S$GLB,,

## 2022-05-12 PROCEDURE — 3074F PR MOST RECENT SYSTOLIC BLOOD PRESSURE < 130 MM HG: ICD-10-PCS | Mod: CPTII,S$GLB,,

## 2022-05-12 PROCEDURE — 3008F BODY MASS INDEX DOCD: CPT | Mod: CPTII,S$GLB,,

## 2022-05-12 PROCEDURE — U0002 COVID-19 LAB TEST NON-CDC: HCPCS | Mod: QW,S$GLB,,

## 2022-05-12 PROCEDURE — 1160F RVW MEDS BY RX/DR IN RCRD: CPT | Mod: CPTII,S$GLB,,

## 2022-05-12 PROCEDURE — 99213 OFFICE O/P EST LOW 20 MIN: CPT | Mod: S$GLB,,,

## 2022-05-12 RX ORDER — NIRMATRELVIR AND RITONAVIR 300-100 MG
3 KIT ORAL 2 TIMES DAILY
Qty: 30 TABLET | Refills: 0 | Status: SHIPPED | OUTPATIENT
Start: 2022-05-12 | End: 2022-05-12 | Stop reason: SDUPTHER

## 2022-05-12 RX ORDER — ESTRADIOL 0.1 MG/G
1 CREAM VAGINAL NIGHTLY
COMMUNITY
Start: 2022-04-29 | End: 2023-08-03

## 2022-05-12 RX ORDER — NIRMATRELVIR AND RITONAVIR 300-100 MG
3 KIT ORAL 2 TIMES DAILY
Qty: 30 TABLET | Refills: 0 | Status: SHIPPED | OUTPATIENT
Start: 2022-05-12 | End: 2022-05-26

## 2022-05-12 RX ORDER — NYSTATIN AND TRIAMCINOLONE ACETONIDE 100000; 1 [USP'U]/G; MG/G
CREAM TOPICAL
COMMUNITY
Start: 2022-04-15 | End: 2023-08-03

## 2022-05-12 RX ORDER — SOLIFENACIN SUCCINATE 10 MG/1
10 TABLET, FILM COATED ORAL
COMMUNITY
Start: 2022-04-29 | End: 2023-08-03

## 2022-05-12 NOTE — PROGRESS NOTES
"Subjective:       Patient ID: Mary Blair is a 56 y.o. female.    Vitals:  height is 5' 2" (1.575 m) and weight is 84.8 kg (187 lb). Her temperature is 99 °F (37.2 °C). Her blood pressure is 119/59 (abnormal) and her pulse is 81. Her respiration is 16 and oxygen saturation is 98%.     Chief Complaint: Sinus Problem    Patient is a 56-year-old female with a history solitary plasmacytoma in remission who presents with fever, chills, fatigue, nasal congestion, body aches, coughing, sneezing, headaches that started yesterday.  She is exposed to a COVID positive family member.  She took a home COVID test was positive.  COVID vaccinated without the booster.  Denies any chest pain, shortness of breath, nausea, vomiting, abdominal pain.    Sinus Problem  This is a new problem. The current episode started yesterday. The problem has been gradually worsening since onset. Maximum temperature: 99.0. The fever has been present for less than 1 day. Her pain is at a severity of 8/10. The pain is severe. Associated symptoms include chills, congestion, coughing, headaches, sinus pressure, sneezing and a sore throat. Pertinent negatives include no diaphoresis or ear pain. Past treatments include acetaminophen. The treatment provided mild relief.       Constitution: Positive for appetite change, chills, fatigue and fever. Negative for activity change and sweating.   HENT: Positive for congestion, sinus pressure and sore throat. Negative for ear pain, postnasal drip and sinus pain.    Respiratory: Positive for cough and sputum production.    Allergic/Immunologic: Positive for sneezing.   Neurological: Positive for headaches.       Objective:      Physical Exam   Constitutional:  Non-toxic appearance. She does not appear ill. No distress. normal  HENT:   Head: Normocephalic and atraumatic.   Ears:   Right Ear: Tympanic membrane, external ear and ear canal normal.   Left Ear: Tympanic membrane, external ear and ear canal normal. "   Nose: Nose normal.   Mouth/Throat: Mucous membranes are moist. Posterior oropharyngeal erythema present. Oropharynx is clear.   Eyes: Conjunctivae are normal. Pupils are equal, round, and reactive to light. Right eye exhibits no discharge. Left eye exhibits no discharge. Extraocular movement intact   Neck: Neck supple.   Cardiovascular: Normal rate, regular rhythm, normal heart sounds and normal pulses.   Pulmonary/Chest: Effort normal and breath sounds normal. No respiratory distress. She has no wheezes. She has no rhonchi. She has no rales. She exhibits no tenderness.   Abdominal: Normal appearance. She exhibits no distension. Soft. There is no abdominal tenderness.   Musculoskeletal: Normal range of motion.         General: Normal range of motion.   Neurological: no focal deficit. She is alert.   Skin: Skin is warm, dry and not diaphoretic. Capillary refill takes less than 2 seconds.         Results for orders placed or performed in visit on 05/12/22   POCT COVID-19 Rapid Screening   Result Value Ref Range    POC Rapid COVID Positive (A) Negative     Acceptable Yes        Assessment:       1. COVID-19 virus infection          Plan:         COVID-19 virus infection  -     POCT COVID-19 Rapid Screening           Medical Decision Making:   Initial Assessment:   COVID risk score 1 (calculated by Epic). Does not meet criteria for infusion. Paxlovid was prescribed by her hematologist. Discussed isolation and quarantine.  Discussed supportive care and over-the-counter medications.  Clinic return to ED precautions given.  Follow-up with PCP.  Patient verbalizes understanding and agrees with plan.       Patient Instructions   You have tested positive for COVID-19 today.    ISOLATION  If you tested positive and do not have symptoms, you must isolate for 5 days starting on the day of the positive test.  If you tested positive and have symptoms, you must isolate for 5 days starting on the day of the first  symptoms,  not the day of the positive test.   This is the most important part, both the CDC and the [facility] emphasize that you do not test out of isolation.   After 5 days, if your symptoms have improved and you have not had fever on day 5, you can return to the community on day 6- NO TESTING REQUIRED!    In fact, we do not retest if you were positive in the last 90 days.  After your 5 days of isolation are completed, the CDC recommends strict mask use for the first 5 days that you come out of isolation.    - Rest.    - Drink plenty of fluids.     - You can take over-the-counter claritin, zyrtec, allegra, or xyzal as directed. These are antihistamines that can help with runny nose, nasal congestion, sneezing, and helps to dry up post-nasal drip, which usually causes sore throat and cough.              - If you do NOT have high blood pressure, you may use a decongestant form (D)  of this medication (ie. Claritin- D, zyrtec-D, allegra-D) or if you do not take the D form, you can take sudafed (pseudoephedrine) over the counter, which is a decongestant. Do NOT take two decongestant (D) medications at the same time (such as mucinex-D and claritin-D or plain sudafed and claritin D)    - If you DO have Hypertension, anxiety, or palpitations, it is safe to take Coricidin HBP for relief of sinus symptoms.     - You can use Flonase (fluticasone) nasal spray as directed for sinus congestion and postnasal drip. This is a steroid nasal spray that works locally over time to decrease the inflammation in your nose/sinuses and help with allergic symptoms. This is not an quick- relief spray like afrin, but it works well if used daily.  Discontinue if you develop nose bleed  - use nasal saline prior to Flonase.  - Use Ocean Spray Nasal Saline 1-3 puffs each nostril every 2-3 hours then blow out onto tissue. This is to irrigate the nasal passage way to clear the sinus openings. Use until sinus problem resolved.     - you can take plain  Mucinex (guaifenesin) 1200 mg twice a day to help loosen mucous.      -warm salt water gargles can help with sore throat     - warm tea with honey can help with cough. Honey is a natural cough suppressant.     - Dextromethorphan (DM) is a cough suppressant over the counter (ie. mucinex DM, robitussin, delsym; dayquil/nyquil has DM as well.)        - Follow up with your PCP or specialty clinic as directed in the next 1-2 weeks if not improved or as needed.  You can call [phone] to schedule an appointment with the appropriate provider.       - Go to the ER if you develop new or worsening symptoms.      - You must understand that you have received an Urgent Care treatment only and that you may be released before all of your medical problems are known or treated.   - You, the patient, will arrange for follow up care as instructed.   - If your condition worsens or fails to improve we recommend that you receive another evaluation at the ER immediately or contact your PCP to discuss your concerns or return here.

## 2022-05-12 NOTE — PROGRESS NOTES
Patient with Covid positive. Order paxlovid to pharmacy.    Carol Ann Allen NP  Hematology/Oncology/BMT

## 2022-05-12 NOTE — PATIENT INSTRUCTIONS
You have tested positive for COVID-19 today.    ISOLATION  If you tested positive and do not have symptoms, you must isolate for 5 days starting on the day of the positive test.  If you tested positive and have symptoms, you must isolate for 5 days starting on the day of the first symptoms,  not the day of the positive test.   This is the most important part, both the CDC and the [facility] emphasize that you do not test out of isolation.   After 5 days, if your symptoms have improved and you have not had fever on day 5, you can return to the community on day 6- NO TESTING REQUIRED!    In fact, we do not retest if you were positive in the last 90 days.  After your 5 days of isolation are completed, the CDC recommends strict mask use for the first 5 days that you come out of isolation.    - Rest.    - Drink plenty of fluids.     - You can take over-the-counter claritin, zyrtec, allegra, or xyzal as directed. These are antihistamines that can help with runny nose, nasal congestion, sneezing, and helps to dry up post-nasal drip, which usually causes sore throat and cough.              - If you do NOT have high blood pressure, you may use a decongestant form (D)  of this medication (ie. Claritin- D, zyrtec-D, allegra-D) or if you do not take the D form, you can take sudafed (pseudoephedrine) over the counter, which is a decongestant. Do NOT take two decongestant (D) medications at the same time (such as mucinex-D and claritin-D or plain sudafed and claritin D)    - If you DO have Hypertension, anxiety, or palpitations, it is safe to take Coricidin HBP for relief of sinus symptoms.     - You can use Flonase (fluticasone) nasal spray as directed for sinus congestion and postnasal drip. This is a steroid nasal spray that works locally over time to decrease the inflammation in your nose/sinuses and help with allergic symptoms. This is not an quick- relief spray like afrin, but it works well if used daily.  Discontinue if you  develop nose bleed  - use nasal saline prior to Flonase.  - Use Ocean Spray Nasal Saline 1-3 puffs each nostril every 2-3 hours then blow out onto tissue. This is to irrigate the nasal passage way to clear the sinus openings. Use until sinus problem resolved.     - you can take plain Mucinex (guaifenesin) 1200 mg twice a day to help loosen mucous.      -warm salt water gargles can help with sore throat     - warm tea with honey can help with cough. Honey is a natural cough suppressant.     - Dextromethorphan (DM) is a cough suppressant over the counter (ie. mucinex DM, robitussin, delsym; dayquil/nyquil has DM as well.)        - Follow up with your PCP or specialty clinic as directed in the next 1-2 weeks if not improved or as needed.  You can call [phone] to schedule an appointment with the appropriate provider.       - Go to the ER if you develop new or worsening symptoms.      - You must understand that you have received an Urgent Care treatment only and that you may be released before all of your medical problems are known or treated.   - You, the patient, will arrange for follow up care as instructed.   - If your condition worsens or fails to improve we recommend that you receive another evaluation at the ER immediately or contact your PCP to discuss your concerns or return here.

## 2022-05-26 ENCOUNTER — OFFICE VISIT (OUTPATIENT)
Dept: URGENT CARE | Facility: CLINIC | Age: 57
End: 2022-05-26
Payer: COMMERCIAL

## 2022-05-26 VITALS
TEMPERATURE: 99 F | HEART RATE: 78 BPM | SYSTOLIC BLOOD PRESSURE: 101 MMHG | OXYGEN SATURATION: 95 % | HEIGHT: 62 IN | BODY MASS INDEX: 34.41 KG/M2 | RESPIRATION RATE: 18 BRPM | WEIGHT: 187 LBS | DIASTOLIC BLOOD PRESSURE: 72 MMHG

## 2022-05-26 DIAGNOSIS — R53.83 FATIGUE, UNSPECIFIED TYPE: ICD-10-CM

## 2022-05-26 DIAGNOSIS — Z86.16 HISTORY OF COVID-19: ICD-10-CM

## 2022-05-26 DIAGNOSIS — R05.8 POST-VIRAL COUGH SYNDROME: Primary | ICD-10-CM

## 2022-05-26 DIAGNOSIS — R05.9 COUGH: ICD-10-CM

## 2022-05-26 PROCEDURE — 1160F PR REVIEW ALL MEDS BY PRESCRIBER/CLIN PHARMACIST DOCUMENTED: ICD-10-PCS | Mod: CPTII,S$GLB,, | Performed by: FAMILY MEDICINE

## 2022-05-26 PROCEDURE — 1159F MED LIST DOCD IN RCRD: CPT | Mod: CPTII,S$GLB,, | Performed by: FAMILY MEDICINE

## 2022-05-26 PROCEDURE — 1160F RVW MEDS BY RX/DR IN RCRD: CPT | Mod: CPTII,S$GLB,, | Performed by: FAMILY MEDICINE

## 2022-05-26 PROCEDURE — 3078F PR MOST RECENT DIASTOLIC BLOOD PRESSURE < 80 MM HG: ICD-10-PCS | Mod: CPTII,S$GLB,, | Performed by: FAMILY MEDICINE

## 2022-05-26 PROCEDURE — 1159F PR MEDICATION LIST DOCUMENTED IN MEDICAL RECORD: ICD-10-PCS | Mod: CPTII,S$GLB,, | Performed by: FAMILY MEDICINE

## 2022-05-26 PROCEDURE — 99213 PR OFFICE/OUTPT VISIT, EST, LEVL III, 20-29 MIN: ICD-10-PCS | Mod: S$GLB,,, | Performed by: FAMILY MEDICINE

## 2022-05-26 PROCEDURE — 3008F PR BODY MASS INDEX (BMI) DOCUMENTED: ICD-10-PCS | Mod: CPTII,S$GLB,, | Performed by: FAMILY MEDICINE

## 2022-05-26 PROCEDURE — 3074F PR MOST RECENT SYSTOLIC BLOOD PRESSURE < 130 MM HG: ICD-10-PCS | Mod: CPTII,S$GLB,, | Performed by: FAMILY MEDICINE

## 2022-05-26 PROCEDURE — 3078F DIAST BP <80 MM HG: CPT | Mod: CPTII,S$GLB,, | Performed by: FAMILY MEDICINE

## 2022-05-26 PROCEDURE — 3074F SYST BP LT 130 MM HG: CPT | Mod: CPTII,S$GLB,, | Performed by: FAMILY MEDICINE

## 2022-05-26 PROCEDURE — 3008F BODY MASS INDEX DOCD: CPT | Mod: CPTII,S$GLB,, | Performed by: FAMILY MEDICINE

## 2022-05-26 PROCEDURE — 71046 XR CHEST PA AND LATERAL: ICD-10-PCS | Mod: S$GLB,,, | Performed by: RADIOLOGY

## 2022-05-26 PROCEDURE — 99213 OFFICE O/P EST LOW 20 MIN: CPT | Mod: S$GLB,,, | Performed by: FAMILY MEDICINE

## 2022-05-26 PROCEDURE — 71046 X-RAY EXAM CHEST 2 VIEWS: CPT | Mod: S$GLB,,, | Performed by: RADIOLOGY

## 2022-05-26 RX ORDER — PROMETHAZINE HYDROCHLORIDE AND DEXTROMETHORPHAN HYDROBROMIDE 6.25; 15 MG/5ML; MG/5ML
5 SYRUP ORAL EVERY 6 HOURS PRN
Qty: 118 ML | Refills: 0 | Status: SHIPPED | OUTPATIENT
Start: 2022-05-26 | End: 2022-05-26

## 2022-05-26 RX ORDER — BENZONATATE 100 MG/1
100 CAPSULE ORAL 3 TIMES DAILY PRN
Qty: 30 CAPSULE | Refills: 0 | Status: SHIPPED | OUTPATIENT
Start: 2022-05-26 | End: 2022-06-05

## 2022-05-26 RX ORDER — BENZONATATE 100 MG/1
100 CAPSULE ORAL 3 TIMES DAILY PRN
Qty: 30 CAPSULE | Refills: 0 | Status: SHIPPED | OUTPATIENT
Start: 2022-05-26 | End: 2022-05-26

## 2022-05-26 RX ORDER — PROMETHAZINE HYDROCHLORIDE AND DEXTROMETHORPHAN HYDROBROMIDE 6.25; 15 MG/5ML; MG/5ML
5 SYRUP ORAL EVERY 6 HOURS PRN
Qty: 118 ML | Refills: 0 | Status: SHIPPED | OUTPATIENT
Start: 2022-05-26 | End: 2022-10-03

## 2022-05-26 NOTE — PROGRESS NOTES
"Subjective:       Patient ID: Mary Blair is a 56 y.o. female.    Vitals:  height is 5' 2" (1.575 m) and weight is 84.8 kg (187 lb). Her temperature is 98.5 °F (36.9 °C). Her blood pressure is 101/72 and her pulse is 78. Her respiration is 18 and oxygen saturation is 95%.     Chief Complaint: Emesis (Twice )    Pt states she has been having fever for two days . Pt home temp was 100.3 . Pt also states she vomited twice for one day . 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 pcp is Dr. Nguyen. She was sent paxlovid that was sent to her by her oncologist.  Hx of multiple myeloma, oncologist is Dr. Brown. She was scheduled for MRI today but had to cancel bc she would not be able to lay still without coughing.     Emesis   This is a new problem. The current episode started today. The problem occurs 2 to 4 times per day. The problem has been unchanged. The maximum temperature recorded prior to her arrival was 101 - 101.9 F. Associated symptoms include chills, coughing, headaches and sweats. Pertinent negatives include no abdominal pain, chest pain, diarrhea or fever. She has tried nothing for the symptoms.       Constitution: Positive for activity change, chills and fatigue. Negative for appetite change, sweating, fever, unexpected weight change, generalized weakness and international travel in last 60 days.   Neck: Negative for neck pain, neck stiffness, neck swelling and degenerative disc disease.   Cardiovascular: Negative for chest pain, leg swelling, palpitations, sob on exertion and passing out.   Respiratory: Positive for cough. Negative for chest tightness, sputum production, bloody " sputum, COPD, shortness of breath, stridor, wheezing and asthma.    Gastrointestinal: Positive for vomiting. Negative for abdominal pain, abdominal bloating, history of abdominal surgery, nausea, constipation, diarrhea, bright red blood in stool, dark colored stools, rectal bleeding, rectal pain, hemorrhoids, heartburn and bowel incontinence.   Allergic/Immunologic: Negative for asthma.   Neurological: Positive for headaches.       Objective:      Physical Exam   Constitutional: She is oriented to person, place, and time. She appears well-developed.  Non-toxic appearance. She does not appear ill. No distress.   HENT:   Head: Normocephalic and atraumatic.   Ears:   Right Ear: External ear normal.   Left Ear: External ear normal.   Nose: Nose normal.   Mouth/Throat: Oropharynx is clear and moist.   Eyes: Conjunctivae, EOM and lids are normal. Pupils are equal, round, and reactive to light.   Neck: Trachea normal and phonation normal. Neck supple.   Cardiovascular: S1 normal and S2 normal.   Pulmonary/Chest: Effort normal and breath sounds normal.   Abdominal: Bowel sounds are normal. Soft. There is no abdominal tenderness.   Musculoskeletal: Normal range of motion.         General: Normal range of motion.   Neurological: She is alert and oriented to person, place, and time.   Skin: Skin is warm, dry, intact and not diaphoretic.   Psychiatric: Her speech is normal and behavior is normal. Judgment and thought content normal.   Nursing note and vitals reviewed.        Assessment:       1. Post-viral cough syndrome    2. Cough    3. History of COVID-19    4. Fatigue, unspecified type        XR CHEST PA AND LATERAL    Result Date: 5/26/2022  EXAMINATION: XR CHEST PA AND LATERAL CLINICAL HISTORY: Cough, unspecified TECHNIQUE: PA and lateral views of the chest were performed. COMPARISON: 08/30/2019. FINDINGS: The trachea is unremarkable.  The cardiomediastinal silhouette is within normal limits.  The hemidiaphragms are  unremarkable.  There are no pleural effusions.  There is no evidence of a pneumothorax.  There is no evidence of pneumomediastinum.  No airspace opacity is present.  The osseous structures are unremarkable.     No acute process. Electronically signed by: Lito Faith MD Date:    05/26/2022 Time:    16:15    Plan:       Referred her albuterol and Flovent for the possible post viral cough syndrome she declined, would like Tessalon Perles and cough syrup, we also advised on Delsym.  I advised her to follow-up with her PCP to get further labs further evaluation of fatigue after her COVID diagnosis.    Discussed results/diagnosis/plan with patient in clinic. Strict precautions given to patient to monitor for worsening signs and symptoms. Advised to follow up with PCP or specialist.    Explained side effects of medications prescribed with patient and informed him/her to discontinue use if he/she has any side effects and to inform UC or PCP if this occurs. All questions answered. Strict ED verses clinic return precautions stressed and given in depth. Advised if symptoms worsens of fail to improve he/she should go to the Emergency Room. Discharge and follow-up instructions given verbally/printed with the patient who expressed understanding and willingness to comply with my recommendations. Patient voiced understanding and in agreement with current treatment plan. Patient exits the exam room in no acute distress. Conversant and engaged during discharge discussion, verbalized understanding.      Post-viral cough syndrome  -     Discontinue: benzonatate (TESSALON) 100 MG capsule; Take 1 capsule (100 mg total) by mouth 3 (three) times daily as needed for Cough.  Dispense: 30 capsule; Refill: 0  -     Discontinue: promethazine-dextromethorphan (PROMETHAZINE-DM) 6.25-15 mg/5 mL Syrp; Take 5 mLs by mouth every 6 (six) hours as needed (cough).  Dispense: 118 mL; Refill: 0  -     benzonatate (TESSALON) 100 MG capsule; Take 1 capsule  (100 mg total) by mouth 3 (three) times daily as needed for Cough.  Dispense: 30 capsule; Refill: 0  -     promethazine-dextromethorphan (PROMETHAZINE-DM) 6.25-15 mg/5 mL Syrp; Take 5 mLs by mouth every 6 (six) hours as needed (cough).  Dispense: 118 mL; Refill: 0    Cough  -     XR CHEST PA AND LATERAL; Future; Expected date: 05/26/2022  -     Discontinue: benzonatate (TESSALON) 100 MG capsule; Take 1 capsule (100 mg total) by mouth 3 (three) times daily as needed for Cough.  Dispense: 30 capsule; Refill: 0  -     Discontinue: promethazine-dextromethorphan (PROMETHAZINE-DM) 6.25-15 mg/5 mL Syrp; Take 5 mLs by mouth every 6 (six) hours as needed (cough).  Dispense: 118 mL; Refill: 0  -     benzonatate (TESSALON) 100 MG capsule; Take 1 capsule (100 mg total) by mouth 3 (three) times daily as needed for Cough.  Dispense: 30 capsule; Refill: 0  -     promethazine-dextromethorphan (PROMETHAZINE-DM) 6.25-15 mg/5 mL Syrp; Take 5 mLs by mouth every 6 (six) hours as needed (cough).  Dispense: 118 mL; Refill: 0    History of COVID-19    Fatigue, unspecified type           Medical Decision Making:   History:   Old Medical Records: I decided to obtain old medical records.  Old Records Summarized: records from clinic visits.    Additional MDM:     Heart Failure Score:   COPD = No    Patient Instructions   General Discharge Instructions   PLEASE READ YOUR DISCHARGE INSTRUCTIONS ENTIRELY AS IT CONTAINS IMPORTANT INFORMATION.  If you were prescribed a narcotic or controlled medication, do not drive or operate heavy equipment or machinery while taking these medications.  If you were prescribed antibiotics, please take them to completion.  You must understand that you've received an Urgent Care treatment only and that you may be released before all your medical problems are known or treated. You, the patient, will arrange for follow up care as instructed.    OVER THE COUNTER RECOMMENDATIONS/SUGGESTIONS.    Make sure to stay well  hydrated.    Use Nasal Saline to mechanically move any post nasal drip from your eustachian tube or from the back of your throat.    Use warm salt water gargles to ease your throat pain. Warm salt water gargles as needed for sore throat- 1/2 tsp salt to 1 cup warm water, gargle as desired.    Use an antihistamine such as Claritin, Zyrtec or Allegra to dry you out.    Use pseudoephedrine (behind the counter) to decongest. Pseudoephedrine 30 mg up to 240 mg /day. It can raise your blood pressure and give you palpitations.    Use mucinex (guaifenesin) to break up mucous up to 2400mg/day to loosen any mucous.    The mucinex DM pill has a cough suppressant that can be sedating. It can be used at night to stop the tickle at the back of your throat.    You can use Mucinex D (it has guaifenesin and a high dose of pseudoephedrine) in the mornings to help decongest.    Use Afrin in each nare for no longer than 3 days, as it is addictive. It can also dry out your mucous membranes and cause elevated blood pressure. This is especially useful if you are flying.    Use Flonase 1-2 sprays/nostril per day. It is a local acting steroid nasal spray, if you develop a bloody nose, stop using the medication immediately.    Sometimes Nyquil at night is beneficial to help you get some rest, however it is sedating and it does have an antihistamine, and tylenol.    Honey is a natural cough suppressant that can be used.    Tylenol up to 4,000 mg a day is safe for short periods and can be used for body aches, pain, and fever. However in high doses and prolonged use it can cause liver irritation.    Ibuprofen is a non-steroidal anti-inflammatory that can be used for body aches, pain, and fever.However it can also cause stomach irritation if over used.     Follow up with your PCP or specialty clinic as instructed in the next 2-3 days if not improved or as needed. You can call [phone] to schedule an appointment with appropriate provider.       If you condition worsens, we recommend that you receive another evaluation at the emergency room immediately or contact your primary medical clinic's after hours call service to discuss your concerns.      Please return here or go to the Emergency Department for any concerns or worsening condition.   You can also call [phone] to schedule an appointment with the appropriate provider.    Please return here or go to the Emergency Department for any concerns or worsening of condition.    Thank you for choosing [facility]!    Our goal in the Urgent Care is to always provide outstanding medical care. You may receive a survey by mail or e-mail in the next week regarding your experience today. We would greatly appreciate you completing and returning the survey. Your feedback provides us with a way to recognize our staff who provide very good care, and it helps us learn how to improve when your experience was below our aspiration of excellence.      We appreciate you trusting us with your medical care. We hope you feel better soon. We will be happy to take care of you for all of your future medical needs.    Sincerely,    RAJ Wheeler  Cough   If your condition worsens or fails to improve we recommend that you receive another evaluation at the ER immediately or contact your PCP to discuss your concerns or return here. You must understand that you've received an urgent care treatment only and that you may be released before all your medical problems are known or treated. You the patient will arrange for follouwp care as instructed. .  Rest and fluids are important  Can use honey with abe to soothe your throat  Take prescription cough meds (pills) as prescribed; take prescription cough syrup at night as needed for cough.  Do not take both the prescribed cough pills and syrup at the same time or within 6 hours of each other.  Do not take the cough syrup with any other sedative medication as it  "can can cause drowsiness. Do not operate any heavy machinery, drink or drive while taking the cough syrup.   -  Flonase (fluticasone) is a nasal spray which is available over the counter and may help with your symptoms.   -  If you have hypertension avoid using the "D" which is the decongestant.  Instead you can use Coricidin HBP for cold and cough symptoms.    -  If you just have drainage you can take plain Zyrtec, Claritin or Allegra   -  Tylenol or ibuprofen can also be used as directed for pain unless you have an allergy to them or medical condition such as stomach ulcers, kidney or liver disease or blood thinners etc for which you should not be taking these type of medications.   Please follow up with your primary care doctor or specialist in the next 48-72hrs as needed and if no improvement  If you  smoke, please stop smoking.        "

## 2022-05-26 NOTE — LETTER
/  May 26, 2022      [facility] - [facility]  [address], [address]  [place] [address]  Phone: [phone]  Fax: [phone]       Patient: Mary Blair   YOB: 1965  Date of Visit: 05/26/2022    To Whom It May Concern:    Martha Blair  was at [facility] on 05/26/2022. The patient may return to work/school on 5/ with no restrictions. If you have any questions or concerns, or if I can be of further assistance, please do not hesitate to contact me.    Sincerely,    Venessa Rodriges NP

## 2022-05-26 NOTE — PATIENT INSTRUCTIONS
General Discharge Instructions   PLEASE READ YOUR DISCHARGE INSTRUCTIONS ENTIRELY AS IT CONTAINS IMPORTANT INFORMATION.  If you were prescribed a narcotic or controlled medication, do not drive or operate heavy equipment or machinery while taking these medications.  If you were prescribed antibiotics, please take them to completion.  You must understand that you've received an Urgent Care treatment only and that you may be released before all your medical problems are known or treated. You, the patient, will arrange for follow up care as instructed.    OVER THE COUNTER RECOMMENDATIONS/SUGGESTIONS.    Make sure to stay well hydrated.    Use Nasal Saline to mechanically move any post nasal drip from your eustachian tube or from the back of your throat.    Use warm salt water gargles to ease your throat pain. Warm salt water gargles as needed for sore throat- 1/2 tsp salt to 1 cup warm water, gargle as desired.    Use an antihistamine such as Claritin, Zyrtec or Allegra to dry you out.    Use pseudoephedrine (behind the counter) to decongest. Pseudoephedrine 30 mg up to 240 mg /day. It can raise your blood pressure and give you palpitations.    Use mucinex (guaifenesin) to break up mucous up to 2400mg/day to loosen any mucous.    The mucinex DM pill has a cough suppressant that can be sedating. It can be used at night to stop the tickle at the back of your throat.    You can use Mucinex D (it has guaifenesin and a high dose of pseudoephedrine) in the mornings to help decongest.    Use Afrin in each nare for no longer than 3 days, as it is addictive. It can also dry out your mucous membranes and cause elevated blood pressure. This is especially useful if you are flying.    Use Flonase 1-2 sprays/nostril per day. It is a local acting steroid nasal spray, if you develop a bloody nose, stop using the medication immediately.    Sometimes Nyquil at night is beneficial to help you get some rest, however it is sedating and it  does have an antihistamine, and tylenol.    Honey is a natural cough suppressant that can be used.    Tylenol up to 4,000 mg a day is safe for short periods and can be used for body aches, pain, and fever. However in high doses and prolonged use it can cause liver irritation.    Ibuprofen is a non-steroidal anti-inflammatory that can be used for body aches, pain, and fever.However it can also cause stomach irritation if over used.     Follow up with your PCP or specialty clinic as instructed in the next 2-3 days if not improved or as needed. You can call [phone] to schedule an appointment with appropriate provider.      If you condition worsens, we recommend that you receive another evaluation at the emergency room immediately or contact your primary medical clinic's after hours call service to discuss your concerns.      Please return here or go to the Emergency Department for any concerns or worsening condition.   You can also call [phone] to schedule an appointment with the appropriate provider.    Please return here or go to the Emergency Department for any concerns or worsening of condition.    Thank you for choosing [facility]!    Our goal in the Urgent Care is to always provide outstanding medical care. You may receive a survey by mail or e-mail in the next week regarding your experience today. We would greatly appreciate you completing and returning the survey. Your feedback provides us with a way to recognize our staff who provide very good care, and it helps us learn how to improve when your experience was below our aspiration of excellence.      We appreciate you trusting us with your medical care. We hope you feel better soon. We will be happy to take care of you for all of your future medical needs.    Sincerely,    RAJ Wheeler  Cough   If your condition worsens or fails to improve we recommend that you receive another evaluation at the ER immediately or contact your PCP  "to discuss your concerns or return here. You must understand that you've received an urgent care treatment only and that you may be released before all your medical problems are known or treated. You the patient will arrange for follwp care as instructed. .  Rest and fluids are important  Can use honey with abe to soothe your throat  Take prescription cough meds (pills) as prescribed; take prescription cough syrup at night as needed for cough.  Do not take both the prescribed cough pills and syrup at the same time or within 6 hours of each other.  Do not take the cough syrup with any other sedative medication as it can can cause drowsiness. Do not operate any heavy machinery, drink or drive while taking the cough syrup.   -  Flonase (fluticasone) is a nasal spray which is available over the counter and may help with your symptoms.   -  If you have hypertension avoid using the "D" which is the decongestant.  Instead you can use Coricidin HBP for cold and cough symptoms.    -  If you just have drainage you can take plain Zyrtec, Claritin or Allegra   -  Tylenol or ibuprofen can also be used as directed for pain unless you have an allergy to them or medical condition such as stomach ulcers, kidney or liver disease or blood thinners etc for which you should not be taking these type of medications.   Please follow up with your primary care doctor or specialist in the next 48-72hrs as needed and if no improvement  If you  smoke, please stop smoking.    "

## 2022-06-10 ENCOUNTER — HOSPITAL ENCOUNTER (OUTPATIENT)
Dept: RADIOLOGY | Facility: OTHER | Age: 57
Discharge: HOME | End: 2022-06-10
Attending: NURSE PRACTITIONER
Payer: COMMERCIAL

## 2022-06-10 DIAGNOSIS — C90.31 SOLITARY PLASMACYTOMA IN REMISSION: ICD-10-CM

## 2022-06-10 PROCEDURE — A9585 GADOBUTROL INJECTION: HCPCS | Performed by: NURSE PRACTITIONER

## 2022-06-10 PROCEDURE — 72156 MRI NECK SPINE W/O & W/DYE: CPT | Mod: 26,,, | Performed by: INTERNAL MEDICINE

## 2022-06-10 PROCEDURE — 25500020 PHARM REV CODE 255: Performed by: NURSE PRACTITIONER

## 2022-06-10 PROCEDURE — 72158 MRI SPINE CERVICAL-THORACIC-LUMBAR W W/O CONTRAST (XPD): ICD-10-PCS | Mod: 26,,, | Performed by: INTERNAL MEDICINE

## 2022-06-10 PROCEDURE — 72157 MRI SPINE CERVICAL-THORACIC-LUMBAR W W/O CONTRAST (XPD): ICD-10-PCS | Mod: 26,,, | Performed by: INTERNAL MEDICINE

## 2022-06-10 PROCEDURE — 72156 MRI SPINE CERVICAL-THORACIC-LUMBAR W W/O CONTRAST (XPD): ICD-10-PCS | Mod: 26,,, | Performed by: INTERNAL MEDICINE

## 2022-06-10 PROCEDURE — 72158 MRI LUMBAR SPINE W/O & W/DYE: CPT | Mod: TC

## 2022-06-10 PROCEDURE — 72157 MRI CHEST SPINE W/O & W/DYE: CPT | Mod: TC

## 2022-06-10 PROCEDURE — 72158 MRI LUMBAR SPINE W/O & W/DYE: CPT | Mod: 26,,, | Performed by: INTERNAL MEDICINE

## 2022-06-10 PROCEDURE — 72157 MRI CHEST SPINE W/O & W/DYE: CPT | Mod: 26,,, | Performed by: INTERNAL MEDICINE

## 2022-06-10 RX ORDER — GADOBUTROL 604.72 MG/ML
10 INJECTION INTRAVENOUS
Status: COMPLETED | OUTPATIENT
Start: 2022-06-10 | End: 2022-06-10

## 2022-06-10 RX ADMIN — GADOBUTROL 8.5 ML: 604.72 INJECTION INTRAVENOUS at 03:06

## 2022-06-14 ENCOUNTER — PATIENT MESSAGE (OUTPATIENT)
Dept: HEMATOLOGY/ONCOLOGY | Facility: CLINIC | Age: 57
End: 2022-06-14
Payer: COMMERCIAL

## 2022-06-22 DIAGNOSIS — R93.89 ABNORMAL MRI: ICD-10-CM

## 2022-06-22 DIAGNOSIS — C90.32 SOLITARY PLASMACYTOMA IN RELAPSE: Primary | ICD-10-CM

## 2022-06-22 NOTE — PROGRESS NOTES
Order PET CT for abnormal MRI. Patient will f/u with  following Imaging.    Carol Ann Allen NP  Hematology/Oncology/BMT

## 2022-07-15 ENCOUNTER — OFFICE VISIT (OUTPATIENT)
Dept: RHEUMATOLOGY | Facility: CLINIC | Age: 57
End: 2022-07-15
Payer: COMMERCIAL

## 2022-07-15 ENCOUNTER — PATIENT MESSAGE (OUTPATIENT)
Dept: RHEUMATOLOGY | Facility: CLINIC | Age: 57
End: 2022-07-15

## 2022-07-15 VITALS
DIASTOLIC BLOOD PRESSURE: 76 MMHG | HEART RATE: 77 BPM | BODY MASS INDEX: 33.87 KG/M2 | WEIGHT: 185.19 LBS | OXYGEN SATURATION: 98 % | RESPIRATION RATE: 18 BRPM | SYSTOLIC BLOOD PRESSURE: 124 MMHG

## 2022-07-15 DIAGNOSIS — Z71.89 COUNSELING AND COORDINATION OF CARE: ICD-10-CM

## 2022-07-15 DIAGNOSIS — E66.9 CLASS 1 OBESITY WITH BODY MASS INDEX (BMI) OF 33.0 TO 33.9 IN ADULT, UNSPECIFIED OBESITY TYPE, UNSPECIFIED WHETHER SERIOUS COMORBIDITY PRESENT: ICD-10-CM

## 2022-07-15 DIAGNOSIS — M65.30 TRIGGER FINGER OF LEFT HAND, UNSPECIFIED FINGER: ICD-10-CM

## 2022-07-15 DIAGNOSIS — M79.7 FIBROMYALGIA: Primary | ICD-10-CM

## 2022-07-15 DIAGNOSIS — M15.9 PRIMARY OSTEOARTHRITIS INVOLVING MULTIPLE JOINTS: ICD-10-CM

## 2022-07-15 PROCEDURE — 3078F PR MOST RECENT DIASTOLIC BLOOD PRESSURE < 80 MM HG: ICD-10-PCS | Mod: CPTII,S$GLB,, | Performed by: INTERNAL MEDICINE

## 2022-07-15 PROCEDURE — 99204 PR OFFICE/OUTPT VISIT, NEW, LEVL IV, 45-59 MIN: ICD-10-PCS | Mod: S$GLB,,, | Performed by: INTERNAL MEDICINE

## 2022-07-15 PROCEDURE — 3074F SYST BP LT 130 MM HG: CPT | Mod: CPTII,S$GLB,, | Performed by: INTERNAL MEDICINE

## 2022-07-15 PROCEDURE — 99204 OFFICE O/P NEW MOD 45 MIN: CPT | Mod: S$GLB,,, | Performed by: INTERNAL MEDICINE

## 2022-07-15 PROCEDURE — 1159F PR MEDICATION LIST DOCUMENTED IN MEDICAL RECORD: ICD-10-PCS | Mod: CPTII,S$GLB,, | Performed by: INTERNAL MEDICINE

## 2022-07-15 PROCEDURE — 99999 PR PBB SHADOW E&M-EST. PATIENT-LVL III: ICD-10-PCS | Mod: PBBFAC,,, | Performed by: INTERNAL MEDICINE

## 2022-07-15 PROCEDURE — 99999 PR PBB SHADOW E&M-EST. PATIENT-LVL III: CPT | Mod: PBBFAC,,, | Performed by: INTERNAL MEDICINE

## 2022-07-15 PROCEDURE — 3074F PR MOST RECENT SYSTOLIC BLOOD PRESSURE < 130 MM HG: ICD-10-PCS | Mod: CPTII,S$GLB,, | Performed by: INTERNAL MEDICINE

## 2022-07-15 PROCEDURE — 3008F BODY MASS INDEX DOCD: CPT | Mod: CPTII,S$GLB,, | Performed by: INTERNAL MEDICINE

## 2022-07-15 PROCEDURE — 3078F DIAST BP <80 MM HG: CPT | Mod: CPTII,S$GLB,, | Performed by: INTERNAL MEDICINE

## 2022-07-15 PROCEDURE — 3008F PR BODY MASS INDEX (BMI) DOCUMENTED: ICD-10-PCS | Mod: CPTII,S$GLB,, | Performed by: INTERNAL MEDICINE

## 2022-07-15 PROCEDURE — 1159F MED LIST DOCD IN RCRD: CPT | Mod: CPTII,S$GLB,, | Performed by: INTERNAL MEDICINE

## 2022-07-15 RX ORDER — AMITRIPTYLINE HYDROCHLORIDE 10 MG/1
10 TABLET, FILM COATED ORAL NIGHTLY
Qty: 30 TABLET | Refills: 11 | Status: SHIPPED | OUTPATIENT
Start: 2022-07-15 | End: 2023-07-15

## 2022-07-15 RX ORDER — CYCLOBENZAPRINE HCL 5 MG
5 TABLET ORAL 3 TIMES DAILY PRN
Qty: 60 TABLET | Refills: 3 | Status: SHIPPED | OUTPATIENT
Start: 2022-07-15

## 2022-07-15 NOTE — PROGRESS NOTES
RHEUMATOLOGY OUTPATIENT CLINIC NOTE    7/15/2022    Attending Rheumatologist: Osito Rosales  Primary Care Provider: Aldo Menard MD   Physician Requesting Consultation: [place]  [address] [place] [address]  Chief Complaint/Reason For Consultation:  Disease Management and Pain      Subjective:       HPI  Mary Blair is a 57 y.o. White female with medical history noted below who presents for evaluation of joint pain.     Patient presets for evaluation of joint pain. Patient endorses pain in her hands, feet, neck and low back. Endorses trigger fingers. She notes most of these complaints started around 5 years (same time diagnosed with Plasmacytoma). Morning stiffness lasting a couple of hours. Notes swelling in the hands and neck area. She notes pain is worse after activity, rest/relaxation improves pain. Prior bilateral carpal tunnel surgery, right RTC, left hip replacement x 2. Hx of plantar fascitis. No rash/PsO. Dry skin post radiation. Patient notes being told in the past being told she has Fibromyalgia and has been on Cymbalta since. +Fatigue, poor sleep, brain fog/forgetful, paraesthesias of the extremities, myalgias, trouble emptying her bladder (this started after radiation), anxiety, HA, blurry vision, dizziness, GERD, abdominal cramps when she goes use the bathroom followed with some Nausea, miscarriage (x1, healthy baby follows), fevers, wt gain. No constipation/diarrhea, depression, jaw pain, chest pain, SOB, Raynaud's, blood clots.     Review of Systems   Constitutional: Positive for fatigue. Negative for chills, fever and unexpected weight change.   HENT: Positive for trouble swallowing. Negative for mouth sores.    Eyes: Negative for redness and eye dryness.   Respiratory: Positive for cough and shortness of breath.    Cardiovascular: Negative for chest pain.   Gastrointestinal: Positive for constipation. Negative for abdominal distention,  diarrhea, nausea and vomiting.   Genitourinary: Negative for dysuria, genital sores and vaginal dryness.   Musculoskeletal: Positive for arthralgias, back pain, leg pain, myalgias and neck pain. Negative for gait problem, joint swelling, neck stiffness and joint deformity.   Integumentary:  Positive for rash.   Neurological: Positive for numbness and headaches. Negative for weakness.   Hematological: Negative for adenopathy. Bruises/bleeds easily.   Psychiatric/Behavioral: Positive for confusion, decreased concentration and sleep disturbance. The patient is nervous/anxious.    All other systems reviewed and are negative.       Chronic comorbid conditions affecting medical decision making today:  Past Medical History:   Diagnosis Date    Cancer     Plasmacytoma     Rotator cuff disorder     Spinal stenosis      Past Surgical History:   Procedure Laterality Date     SECTION      corpal tunnel      JOINT REPLACEMENT      danielle ulrich      ROTATOR CUFF REPAIR      TONSILLECTOMY      TOTAL HIP ARTHROPLASTY       Family History   Problem Relation Age of Onset    Heart disease Mother     Heart disease Father     No Known Problems Sister     No Known Problems Brother     No Known Problems Maternal Aunt     No Known Problems Maternal Uncle     No Known Problems Paternal Aunt     No Known Problems Paternal Uncle     Cataracts Maternal Grandmother     No Known Problems Maternal Grandfather     No Known Problems Paternal Grandmother     No Known Problems Paternal Grandfather     Amblyopia Neg Hx     Blindness Neg Hx     Cancer Neg Hx     Diabetes Neg Hx     Glaucoma Neg Hx     Hypertension Neg Hx     Macular degeneration Neg Hx     Retinal detachment Neg Hx     Strabismus Neg Hx     Stroke Neg Hx     Thyroid disease Neg Hx      Social History     Substance and Sexual Activity   Alcohol Use Yes    Comment: occassionally     Social History     Tobacco Use   Smoking Status Never Smoker    Smokeless Tobacco Never Used     Social History     Substance and Sexual Activity   Drug Use No       Current Outpatient Medications:     DULoxetine (CYMBALTA) 60 MG capsule, Take 60 mg by mouth once daily. , Disp: , Rfl:     estradioL (ESTRACE) 0.01 % (0.1 mg/gram) vaginal cream, Place 1 g vaginally nightly., Disp: , Rfl:     nystatin-triamcinolone (MYCOLOG II) cream, Apply topically as needed., Disp: , Rfl:     pantoprazole (PROTONIX) 40 MG tablet, Take 40 mg by mouth every morning., Disp: , Rfl:     solifenacin (VESICARE) 10 MG tablet, Take 10 mg by mouth., Disp: , Rfl:     amitriptyline (ELAVIL) 10 MG tablet, Take 1 tablet (10 mg total) by mouth every evening., Disp: 30 tablet, Rfl: 11    cyclobenzaprine (FLEXERIL) 5 MG tablet, Take 1 tablet (5 mg total) by mouth 3 (three) times daily as needed for Muscle spasms., Disp: 60 tablet, Rfl: 3    promethazine-dextromethorphan (PROMETHAZINE-DM) 6.25-15 mg/5 mL Syrp, Take 5 mLs by mouth every 6 (six) hours as needed (cough). (Patient not taking: Reported on 7/15/2022), Disp: 118 mL, Rfl: 0     Objective:         Vitals:    07/15/22 0932   BP: 124/76   Pulse: 77   Resp: 18     Physical Exam  Can make fist, no synovitis  Bilateral 1st CMC TTP  Left 2-5th A1 pulley nodules  Knee crepitus  Negative ankle/MTP  Tender Points:  No   Yes  []    [x]   Low cervical anterior aspect    []    [x]   Costochondral Junction   []    [x]   Lateral Epicondyle  []    [x]   Suboccipital   []    [x]   Trapezius   []    [x]   Supraspinatus   []    [x]   Gluteal   []    [x]   Greater trochanter  []    [x]   Knee        Reviewed old and all outside pertinent medical records available.    All lab results personally reviewed and interpreted by me.  Lab Results   Component Value Date    WBC 5.37 04/06/2022    HGB 13.0 04/06/2022    HCT 40.2 04/06/2022    MCV 94 04/06/2022    MCH 30.4 04/06/2022    MCHC 32.3 04/06/2022    RDW 13.2 04/06/2022     04/06/2022    MPV 9.6 04/06/2022     PLTEST Appears normal 09/24/2019       Lab Results   Component Value Date     04/06/2022    K 4.3 04/06/2022     04/06/2022    CO2 26 04/06/2022    GLU 79 04/06/2022    BUN 12 04/06/2022    CALCIUM 9.1 04/06/2022    PROT 6.7 04/06/2022    ALBUMIN 3.8 04/06/2022    BILITOT 0.3 04/06/2022    AST 19 04/06/2022    ALKPHOS 85 04/06/2022    ALT 23 04/06/2022       Lab Results   Component Value Date    COLORU Yellow 08/30/2019    APPEARANCEUA Cloudy (A) 08/30/2019    SPECGRAV 1.015 08/30/2019    PHUR 7.0 08/30/2019    PROTEINUA 2+ (A) 08/30/2019    KETONESU 1+ (A) 08/30/2019    LEUKOCYTESUR 3+ (A) 08/30/2019    NITRITE Negative 08/30/2019    UROBILINOGEN Negative 06/30/2017       No results found for: CRP    No results found for: SEDRATE, ERYTHROCYTES    No results found for: VERONICA, RF, SEDRATE    No components found for: 25OHVITDTOT, 48MWREAX7, 07ILRZUZ0, METHODNOTE    Lab Results   Component Value Date    URICACID 4.8 04/06/2022       No components found for: TSPOTTB        Imaging:  All imaging reviewed and independently interpreted by me.         ASSESSMENT / PLAN:     Mary Blair is a 57 y.o. White female with:      1. Fibromyalgia  - patients ongoing complaints consistent with Fibromyalgia diagnosis  - discussed diagnosis and management  - will get labs as below to R/O other causes of pain and systemic disease  - start Elavil, SE discussed  - add Flexeril, SE discussed  - continue Cymbalta  - sleep hygiene and stress management discussed  - reassurance and exercise   - C-Reactive Protein; Future  - VERONICA Screen w/Reflex; Future  - Rheumatoid Factor; Future  - CBC Auto Differential; Future  - Comprehensive Metabolic Panel; Future  - Cyclic Citrullinated Peptide Antibody, IgG; Future  - Sedimentation rate; Future  - Vitamin D; Future  - Uric Acid; Future  - TSH; Future  - Hepatitis C Antibody; Future  - Hepatitis B Surface Antigen; Future  - Hepatitis B Surface Ab, Qualitative; Future  - CK; Future  -  Quantiferon Gold TB; Future  - HIV 1/2 Ag/Ab (4th Gen); Future  - Vitamin B12; Future  - cyclobenzaprine (FLEXERIL) 5 MG tablet; Take 1 tablet (5 mg total) by mouth 3 (three) times daily as needed for Muscle spasms.  Dispense: 60 tablet; Refill: 3  - amitriptyline (ELAVIL) 10 MG tablet; Take 1 tablet (10 mg total) by mouth every evening.  Dispense: 30 tablet; Refill: 11    2. Primary osteoarthritis involving multiple joints  - in addition to FM she has OA which makes pain more difficult to treat  - discussed diagnosis and management  - advised to use Tylenol  - wt loss  - encouraged to follow up with Ortho for shoulder  - reassurance and exercise    3. Trigger finger of left hand, unspecified finger  - discussed diagnosis and management  - Ice area  - trigger finger splint  - can consider CSI in future   - reassurance     3. Other specified counseling  - over 10 minutes spent regarding below topics:  - Immunization counseling done.  - Weight loss counseling done.  - Nutrition and exercise counseling.  - Limitation of alcohol consumption.  - Regular exercise:  Aerobic and resistance.  - Medication counseling provided.    4. Obesity  - would benefit from decreasing at least 10% of body weight.  - recommended goal of losing 1 lb per week.  - consider nutritionist evaluation.      Follow up in about 6 weeks (around 8/26/2022).    Method of contact with patient concerns: [facility]    Disclaimer:  This note is prepared using voice recognition software and as such is likely to have errors and has not been proof read. Please contact me for questions.     Time spent: 45 minutes in face to face discussion concerning diagnosis, prognosis, review of lab and test results, benefits of treatment as well as management of disease, counseling of patient and coordination of care between various health care providers.  Greater than half the time spent was used for coordination of care and counseling of  patient.    Osito Rosales M.D.  Rheumatology Department   [facility]

## 2022-09-05 ENCOUNTER — PATIENT MESSAGE (OUTPATIENT)
Dept: RHEUMATOLOGY | Facility: CLINIC | Age: 57
End: 2022-09-05
Payer: COMMERCIAL

## 2022-09-06 ENCOUNTER — LAB VISIT (OUTPATIENT)
Dept: LAB | Facility: HOSPITAL | Age: 57
End: 2022-09-06
Attending: INTERNAL MEDICINE
Payer: COMMERCIAL

## 2022-09-06 DIAGNOSIS — M79.7 FIBROMYALGIA: ICD-10-CM

## 2022-09-06 LAB
25(OH)D3+25(OH)D2 SERPL-MCNC: 38 NG/ML (ref 30–96)
ALBUMIN SERPL BCP-MCNC: 3.8 G/DL (ref 3.5–5.2)
ALP SERPL-CCNC: 79 U/L (ref 55–135)
ALT SERPL W/O P-5'-P-CCNC: 15 U/L (ref 10–44)
ANION GAP SERPL CALC-SCNC: 16 MMOL/L (ref 8–16)
AST SERPL-CCNC: 16 U/L (ref 10–40)
BASOPHILS # BLD AUTO: 0.05 K/UL (ref 0–0.2)
BASOPHILS NFR BLD: 1 % (ref 0–1.9)
BILIRUB SERPL-MCNC: 0.2 MG/DL (ref 0.1–1)
BUN SERPL-MCNC: 13 MG/DL (ref 6–20)
CALCIUM SERPL-MCNC: 9.6 MG/DL (ref 8.7–10.5)
CCP AB SER IA-ACNC: <0.5 U/ML
CHLORIDE SERPL-SCNC: 103 MMOL/L (ref 95–110)
CK SERPL-CCNC: 56 U/L (ref 20–180)
CO2 SERPL-SCNC: 25 MMOL/L (ref 23–29)
CREAT SERPL-MCNC: 1 MG/DL (ref 0.5–1.4)
CRP SERPL-MCNC: 12.5 MG/L (ref 0–8.2)
DIFFERENTIAL METHOD: NORMAL
EOSINOPHIL # BLD AUTO: 0.1 K/UL (ref 0–0.5)
EOSINOPHIL NFR BLD: 1.8 % (ref 0–8)
ERYTHROCYTE [DISTWIDTH] IN BLOOD BY AUTOMATED COUNT: 12.8 % (ref 11.5–14.5)
ERYTHROCYTE [SEDIMENTATION RATE] IN BLOOD BY PHOTOMETRIC METHOD: 72 MM/HR (ref 0–36)
EST. GFR  (NO RACE VARIABLE): >60 ML/MIN/1.73 M^2
GLUCOSE SERPL-MCNC: 84 MG/DL (ref 70–110)
HBV SURFACE AB SER-ACNC: >1000 MIU/ML
HBV SURFACE AB SER-ACNC: REACTIVE M[IU]/ML
HBV SURFACE AG SERPL QL IA: NORMAL
HCT VFR BLD AUTO: 40.9 % (ref 37–48.5)
HCV AB SERPL QL IA: NORMAL
HGB BLD-MCNC: 13.5 G/DL (ref 12–16)
HIV 1+2 AB+HIV1 P24 AG SERPL QL IA: NORMAL
IMM GRANULOCYTES # BLD AUTO: 0.01 K/UL (ref 0–0.04)
IMM GRANULOCYTES NFR BLD AUTO: 0.2 % (ref 0–0.5)
LYMPHOCYTES # BLD AUTO: 1.7 K/UL (ref 1–4.8)
LYMPHOCYTES NFR BLD: 32.2 % (ref 18–48)
MCH RBC QN AUTO: 31 PG (ref 27–31)
MCHC RBC AUTO-ENTMCNC: 33 G/DL (ref 32–36)
MCV RBC AUTO: 94 FL (ref 82–98)
MONOCYTES # BLD AUTO: 0.4 K/UL (ref 0.3–1)
MONOCYTES NFR BLD: 7.4 % (ref 4–15)
NEUTROPHILS # BLD AUTO: 3 K/UL (ref 1.8–7.7)
NEUTROPHILS NFR BLD: 57.4 % (ref 38–73)
NRBC BLD-RTO: 0 /100 WBC
PLATELET # BLD AUTO: 367 K/UL (ref 150–450)
PMV BLD AUTO: 9.5 FL (ref 9.2–12.9)
POTASSIUM SERPL-SCNC: 3.8 MMOL/L (ref 3.5–5.1)
PROT SERPL-MCNC: 7.4 G/DL (ref 6–8.4)
RBC # BLD AUTO: 4.35 M/UL (ref 4–5.4)
RHEUMATOID FACT SERPL-ACNC: <13 IU/ML (ref 0–15)
SODIUM SERPL-SCNC: 144 MMOL/L (ref 136–145)
TSH SERPL DL<=0.005 MIU/L-ACNC: 1.51 UIU/ML (ref 0.4–4)
URATE SERPL-MCNC: 4.8 MG/DL (ref 2.4–5.7)
VIT B12 SERPL-MCNC: 227 PG/ML (ref 210–950)
WBC # BLD AUTO: 5.13 K/UL (ref 3.9–12.7)

## 2022-09-06 PROCEDURE — 84443 ASSAY THYROID STIM HORMONE: CPT | Performed by: INTERNAL MEDICINE

## 2022-09-06 PROCEDURE — 86038 ANTINUCLEAR ANTIBODIES: CPT | Performed by: INTERNAL MEDICINE

## 2022-09-06 PROCEDURE — 36415 COLL VENOUS BLD VENIPUNCTURE: CPT | Performed by: INTERNAL MEDICINE

## 2022-09-06 PROCEDURE — 82607 VITAMIN B-12: CPT | Performed by: INTERNAL MEDICINE

## 2022-09-06 PROCEDURE — 86706 HEP B SURFACE ANTIBODY: CPT | Mod: 91 | Performed by: INTERNAL MEDICINE

## 2022-09-06 PROCEDURE — 86431 RHEUMATOID FACTOR QUANT: CPT | Performed by: INTERNAL MEDICINE

## 2022-09-06 PROCEDURE — 86803 HEPATITIS C AB TEST: CPT | Performed by: INTERNAL MEDICINE

## 2022-09-06 PROCEDURE — 82550 ASSAY OF CK (CPK): CPT | Performed by: INTERNAL MEDICINE

## 2022-09-06 PROCEDURE — 85652 RBC SED RATE AUTOMATED: CPT | Performed by: INTERNAL MEDICINE

## 2022-09-06 PROCEDURE — 85025 COMPLETE CBC W/AUTO DIFF WBC: CPT | Performed by: INTERNAL MEDICINE

## 2022-09-06 PROCEDURE — 82306 VITAMIN D 25 HYDROXY: CPT | Performed by: INTERNAL MEDICINE

## 2022-09-06 PROCEDURE — 87389 HIV-1 AG W/HIV-1&-2 AB AG IA: CPT | Performed by: INTERNAL MEDICINE

## 2022-09-06 PROCEDURE — 84550 ASSAY OF BLOOD/URIC ACID: CPT | Performed by: INTERNAL MEDICINE

## 2022-09-06 PROCEDURE — 86200 CCP ANTIBODY: CPT | Performed by: INTERNAL MEDICINE

## 2022-09-06 PROCEDURE — 80053 COMPREHEN METABOLIC PANEL: CPT | Performed by: INTERNAL MEDICINE

## 2022-09-06 PROCEDURE — 86140 C-REACTIVE PROTEIN: CPT | Performed by: INTERNAL MEDICINE

## 2022-09-06 PROCEDURE — 87340 HEPATITIS B SURFACE AG IA: CPT | Performed by: INTERNAL MEDICINE

## 2022-09-07 LAB — ANA SER QL IF: NORMAL

## 2022-09-09 ENCOUNTER — OFFICE VISIT (OUTPATIENT)
Dept: RHEUMATOLOGY | Facility: CLINIC | Age: 57
End: 2022-09-09
Payer: COMMERCIAL

## 2022-09-09 VITALS
HEART RATE: 88 BPM | HEIGHT: 62 IN | WEIGHT: 190.06 LBS | BODY MASS INDEX: 34.98 KG/M2 | DIASTOLIC BLOOD PRESSURE: 72 MMHG | SYSTOLIC BLOOD PRESSURE: 107 MMHG | OXYGEN SATURATION: 96 %

## 2022-09-09 DIAGNOSIS — R79.82 CRP ELEVATED: ICD-10-CM

## 2022-09-09 DIAGNOSIS — Z71.89 COUNSELING AND COORDINATION OF CARE: ICD-10-CM

## 2022-09-09 DIAGNOSIS — E66.9 CLASS 1 OBESITY WITH BODY MASS INDEX (BMI) OF 34.0 TO 34.9 IN ADULT, UNSPECIFIED OBESITY TYPE, UNSPECIFIED WHETHER SERIOUS COMORBIDITY PRESENT: ICD-10-CM

## 2022-09-09 DIAGNOSIS — M15.9 PRIMARY OSTEOARTHRITIS INVOLVING MULTIPLE JOINTS: ICD-10-CM

## 2022-09-09 DIAGNOSIS — R70.0 ESR RAISED: ICD-10-CM

## 2022-09-09 DIAGNOSIS — M79.7 FIBROMYALGIA: Primary | ICD-10-CM

## 2022-09-09 PROCEDURE — 3078F DIAST BP <80 MM HG: CPT | Mod: CPTII,S$GLB,, | Performed by: INTERNAL MEDICINE

## 2022-09-09 PROCEDURE — 3008F PR BODY MASS INDEX (BMI) DOCUMENTED: ICD-10-PCS | Mod: CPTII,S$GLB,, | Performed by: INTERNAL MEDICINE

## 2022-09-09 PROCEDURE — 1159F MED LIST DOCD IN RCRD: CPT | Mod: CPTII,S$GLB,, | Performed by: INTERNAL MEDICINE

## 2022-09-09 PROCEDURE — 99214 OFFICE O/P EST MOD 30 MIN: CPT | Mod: S$GLB,,, | Performed by: INTERNAL MEDICINE

## 2022-09-09 PROCEDURE — 99999 PR PBB SHADOW E&M-EST. PATIENT-LVL III: ICD-10-PCS | Mod: PBBFAC,,, | Performed by: INTERNAL MEDICINE

## 2022-09-09 PROCEDURE — 3078F PR MOST RECENT DIASTOLIC BLOOD PRESSURE < 80 MM HG: ICD-10-PCS | Mod: CPTII,S$GLB,, | Performed by: INTERNAL MEDICINE

## 2022-09-09 PROCEDURE — 1159F PR MEDICATION LIST DOCUMENTED IN MEDICAL RECORD: ICD-10-PCS | Mod: CPTII,S$GLB,, | Performed by: INTERNAL MEDICINE

## 2022-09-09 PROCEDURE — 99214 PR OFFICE/OUTPT VISIT, EST, LEVL IV, 30-39 MIN: ICD-10-PCS | Mod: S$GLB,,, | Performed by: INTERNAL MEDICINE

## 2022-09-09 PROCEDURE — 99999 PR PBB SHADOW E&M-EST. PATIENT-LVL III: CPT | Mod: PBBFAC,,, | Performed by: INTERNAL MEDICINE

## 2022-09-09 PROCEDURE — 3008F BODY MASS INDEX DOCD: CPT | Mod: CPTII,S$GLB,, | Performed by: INTERNAL MEDICINE

## 2022-09-09 PROCEDURE — 3074F SYST BP LT 130 MM HG: CPT | Mod: CPTII,S$GLB,, | Performed by: INTERNAL MEDICINE

## 2022-09-09 PROCEDURE — 3074F PR MOST RECENT SYSTOLIC BLOOD PRESSURE < 130 MM HG: ICD-10-PCS | Mod: CPTII,S$GLB,, | Performed by: INTERNAL MEDICINE

## 2022-09-09 NOTE — PROGRESS NOTES
RHEUMATOLOGY OUTPATIENT CLINIC NOTE    9/9/2022    Attending Rheumatologist: Osito Rosales  Primary Care Provider: Aldo Menard MD   Physician Requesting Consultation: No referring provider defined for this encounter.  Chief Complaint/Reason For Consultation:  No chief complaint on file.      Subjective:       HPI  Mary Blair is a 57 y.o. White female with medical history noted below who presents for evaluation of joint pain.   Patient presets for evaluation of joint pain. Patient endorses pain in her hands, feet, neck and low back. Endorses trigger fingers. She notes most of these complaints started around 5 years (same time diagnosed with Plasmacytoma). Morning stiffness lasting a couple of hours. Notes swelling in the hands and neck area. She notes pain is worse after activity, rest/relaxation improves pain. Prior bilateral carpal tunnel surgery, right RTC, left hip replacement x 2. Hx of plantar fascitis. No rash/PsO. Dry skin post radiation. Patient notes being told in the past being told she has Fibromyalgia and has been on Cymbalta since. +Fatigue, poor sleep, brain fog/forgetful, paraesthesias of the extremities, myalgias, trouble emptying her bladder (this started after radiation), anxiety, HA, blurry vision, dizziness, GERD, abdominal cramps when she goes use the bathroom followed with some Nausea, miscarriage (x1, healthy baby follows), fevers, wt gain. No constipation/diarrhea, depression, jaw pain, chest pain, SOB, Raynaud's, blood clots.     Today  Patient here for follow up.   Last visit presented for evaluation of joint pain. Work up obtained, working diagnosis Fibromyalgia. She notes she had a flare up of her lower back last week, following with Spine. Has upcoming PET scan. Rest per ROS. Tolerating meds.     Review of Systems   Constitutional:  Positive for fatigue. Negative for chills, fever and unexpected weight change.   HENT:  Positive for trouble swallowing.  Negative for mouth sores.    Eyes:  Negative for redness and eye dryness.   Respiratory:  Positive for cough and shortness of breath.    Cardiovascular:  Negative for chest pain.   Gastrointestinal:  Positive for constipation. Negative for abdominal distention, diarrhea, nausea and vomiting.   Genitourinary:  Negative for dysuria, genital sores and vaginal dryness.   Musculoskeletal:  Positive for arthralgias, back pain, leg pain, myalgias and neck pain. Negative for gait problem, joint swelling, neck stiffness and joint deformity.   Integumentary:  Positive for rash.   Neurological:  Positive for numbness and headaches. Negative for weakness.   Hematological:  Negative for adenopathy. Bruises/bleeds easily.   Psychiatric/Behavioral:  Positive for confusion, decreased concentration and sleep disturbance. The patient is nervous/anxious.    All other systems reviewed and are negative.     Chronic comorbid conditions affecting medical decision making today:  Past Medical History:   Diagnosis Date    Cancer     Plasmacytoma     Rotator cuff disorder     Spinal stenosis      Past Surgical History:   Procedure Laterality Date     SECTION      corpal tunnel      JOINT REPLACEMENT      danielle ulrich      ROTATOR CUFF REPAIR      TONSILLECTOMY      TOTAL HIP ARTHROPLASTY       Family History   Problem Relation Age of Onset    Heart disease Mother     Heart disease Father     No Known Problems Sister     No Known Problems Brother     No Known Problems Maternal Aunt     No Known Problems Maternal Uncle     No Known Problems Paternal Aunt     No Known Problems Paternal Uncle     Cataracts Maternal Grandmother     No Known Problems Maternal Grandfather     No Known Problems Paternal Grandmother     No Known Problems Paternal Grandfather     Amblyopia Neg Hx     Blindness Neg Hx     Cancer Neg Hx     Diabetes Neg Hx     Glaucoma Neg Hx     Hypertension Neg Hx     Macular degeneration Neg Hx     Retinal detachment Neg Hx      Strabismus Neg Hx     Stroke Neg Hx     Thyroid disease Neg Hx      Social History     Substance and Sexual Activity   Alcohol Use Yes    Comment: occassionally     Social History     Tobacco Use   Smoking Status Never   Smokeless Tobacco Never     Social History     Substance and Sexual Activity   Drug Use No       Current Outpatient Medications:     amitriptyline (ELAVIL) 10 MG tablet, Take 1 tablet (10 mg total) by mouth every evening., Disp: 30 tablet, Rfl: 11    cyclobenzaprine (FLEXERIL) 5 MG tablet, Take 1 tablet (5 mg total) by mouth 3 (three) times daily as needed for Muscle spasms., Disp: 60 tablet, Rfl: 3    DULoxetine (CYMBALTA) 60 MG capsule, Take 60 mg by mouth once daily. , Disp: , Rfl:     estradioL (ESTRACE) 0.01 % (0.1 mg/gram) vaginal cream, Place 1 g vaginally nightly., Disp: , Rfl:     nystatin-triamcinolone (MYCOLOG II) cream, Apply topically as needed., Disp: , Rfl:     pantoprazole (PROTONIX) 40 MG tablet, Take 40 mg by mouth every morning., Disp: , Rfl:     promethazine-dextromethorphan (PROMETHAZINE-DM) 6.25-15 mg/5 mL Syrp, Take 5 mLs by mouth every 6 (six) hours as needed (cough)., Disp: 118 mL, Rfl: 0    solifenacin (VESICARE) 10 MG tablet, Take 10 mg by mouth., Disp: , Rfl:      Objective:         Vitals:    09/09/22 0840   BP: 107/72   Pulse: 88     Physical Exam  Can make fist, no synovitis  Bilateral 1st CMC TTP  Left 2-5th A1 pulley nodules  Knee crepitus  Negative ankle/MTP  Tender Points:  No   Yes  []    [x]   Low cervical anterior aspect    []    [x]   Costochondral Junction   []    [x]   Lateral Epicondyle  []    [x]   Suboccipital   []    [x]   Trapezius   []    [x]   Supraspinatus   []    [x]   Gluteal   []    [x]   Greater trochanter  []    [x]   Knee        Reviewed old and all outside pertinent medical records available.    All lab results personally reviewed and interpreted by me.  Lab Results   Component Value Date    WBC 5.13 09/06/2022    HGB 13.5 09/06/2022    HCT  40.9 09/06/2022    MCV 94 09/06/2022    MCH 31.0 09/06/2022    MCHC 33.0 09/06/2022    RDW 12.8 09/06/2022     09/06/2022    MPV 9.5 09/06/2022    PLTEST Appears normal 09/24/2019       Lab Results   Component Value Date     09/06/2022    K 3.8 09/06/2022     09/06/2022    CO2 25 09/06/2022    GLU 84 09/06/2022    BUN 13 09/06/2022    CALCIUM 9.6 09/06/2022    PROT 7.4 09/06/2022    ALBUMIN 3.8 09/06/2022    BILITOT 0.2 09/06/2022    AST 16 09/06/2022    ALKPHOS 79 09/06/2022    ALT 15 09/06/2022       Lab Results   Component Value Date    COLORU Yellow 08/30/2019    APPEARANCEUA Cloudy (A) 08/30/2019    SPECGRAV 1.015 08/30/2019    PHUR 7.0 08/30/2019    PROTEINUA 2+ (A) 08/30/2019    KETONESU 1+ (A) 08/30/2019    LEUKOCYTESUR 3+ (A) 08/30/2019    NITRITE Negative 08/30/2019    UROBILINOGEN Negative 06/30/2017       Lab Results   Component Value Date    CRP 12.5 (H) 09/06/2022       Lab Results   Component Value Date    SEDRATE 72 (H) 09/06/2022       Lab Results   Component Value Date    RF <13.0 09/06/2022    SEDRATE 72 (H) 09/06/2022       No components found for: 25OHVITDTOT, 85HKMBKR9, 12FEZUDL3, METHODNOTE    Lab Results   Component Value Date    URICACID 4.8 09/06/2022       No components found for: TSPOTTB        Imaging:  All imaging reviewed and independently interpreted by me.         ASSESSMENT / PLAN:     Mary Blair is a 57 y.o. White female with:      1. Fibromyalgia  - improving  - continue Elavil, Flexeril and Cymbalta   - sleep hygiene and stress management reinforced   - reassurance and exercise     2. Primary osteoarthritis involving multiple joints  - in addition to FM she has OA which makes pain more difficult to treat  - stable   - Tylenol PRN   - wt loss  - reassurance and exercise    3. ESR and CRP Elevation   - discussed results  - possibly related to obesity vs acute process of her back   - repeat at later date  - reassurance    4. Other specified  counseling  - over 10 minutes spent regarding below topics:  - Immunization counseling done.  - Weight loss counseling done.  - Nutrition and exercise counseling.  - Limitation of alcohol consumption.  - Regular exercise:  Aerobic and resistance.  - Medication counseling provided.    5. Obesity  - would benefit from decreasing at least 10% of body weight.  - recommended goal of losing 1 lb per week.  - consider nutritionist evaluation.      Follow up in about 6 months (around 3/9/2023).    Method of contact with patient concerns: [facility]    Disclaimer:  This note is prepared using voice recognition software and as such is likely to have errors and has not been proof read. Please contact me for questions.     Time spent: 30 minutes in face to face discussion concerning diagnosis, prognosis, review of lab and test results, benefits of treatment as well as management of disease, counseling of patient and coordination of care between various health care providers.  Greater than half the time spent was used for coordination of care and counseling of patient.    Osito Rosales M.D.  Rheumatology Department   [facility]

## 2022-09-19 ENCOUNTER — TELEPHONE (OUTPATIENT)
Dept: HEMATOLOGY/ONCOLOGY | Facility: CLINIC | Age: 57
End: 2022-09-19
Payer: COMMERCIAL

## 2022-09-23 ENCOUNTER — HOSPITAL ENCOUNTER (OUTPATIENT)
Dept: RADIOLOGY | Facility: HOSPITAL | Age: 57
Discharge: HOME | End: 2022-09-23
Attending: NURSE PRACTITIONER
Payer: COMMERCIAL

## 2022-09-23 DIAGNOSIS — C90.32 SOLITARY PLASMACYTOMA IN RELAPSE: ICD-10-CM

## 2022-09-23 DIAGNOSIS — R93.89 ABNORMAL MRI: ICD-10-CM

## 2022-09-23 LAB — POCT GLUCOSE: 98 MG/DL (ref 70–110)

## 2022-09-23 PROCEDURE — 78816 NM PET CT WHOLE BODY: ICD-10-PCS | Mod: 26,PS,, | Performed by: STUDENT IN AN ORGANIZED HEALTH CARE EDUCATION/TRAINING PROGRAM

## 2022-09-23 PROCEDURE — 78816 PET IMAGE W/CT FULL BODY: CPT | Mod: TC

## 2022-09-23 PROCEDURE — 78816 PET IMAGE W/CT FULL BODY: CPT | Mod: 26,PS,, | Performed by: STUDENT IN AN ORGANIZED HEALTH CARE EDUCATION/TRAINING PROGRAM

## 2022-10-02 NOTE — PROGRESS NOTES
"  PATIENT: Mary Blair  MRN: [id number]  DATE: 10/3/2022      Diagnosis:   1. 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 Solitary plasmacytoma in remission          Chief Complaint: Results (Solitary plasmacytoma)    Mary Blair is a 53M with PMHx of plasmacytoma of the lumbar spine previously treated at [facility] with plasmacytoma presented to our hematology clinic as a follow up      Follow up 9/15/2020  - C/o chronic back pain much worse than before, neck/shoulder pain, elbow, back pain, ankles all over. Seeing a spine surgeon Dr. Vieyra and had MRI in 6/2020 which revealed mild disc prolapse in lumbar disc  - c/o feeling cold mostly evening, but never had temperature >100.4. No weight loss, vomiting, headaches, diarrhea or bleeding issues  - Currently on cymbalta for fibromyalgia and Tizanidine for back spasms  - Her myeloma labs SPEP, Immunoglobulins, free LT chains, MIRNA are pending  - 12/20/2019- No FDG PET/CT findings to suggest recurrent or metastatic disease    Hematology History   She initially presented with back pain in early February of 2018 and underwent bone scan on 2/27/2018 which revealed Increased uptake within L3 body corresponding to lytic lesion seen on 2/16/18 CT and underwent back surgery on 2/28/2018. A bone marrow biopsy done on 3/8/2018 was negative for any malignancy. However the biopsy of L3 vertebral body lesion revealed lambda restricted plasma cell neoplasm and skeletal survey done on 3/21/2018 revealed "Periprosthetic lucency associated with the left proximal femur potentially reflecting osteolysis rather than malignancy". She underwent ratiation threapy to L3, L4 and L5 of spine from March to May 8, 2018 for a total of 8 weeks due to suspicion for invasion of adjacent vertebrae.      4/11/2018 xray of L-spine revealed no suspicious osseous lesions and intact posterior spinal fusion L2-L4.  4/13/2018 - MIRNA + UPEP- Normal immunofixation pattern  4/26/2018 - CT abdo/pelvis- NO " suspicious osseous lesions identified  2018 - CT Thoracic Spine W/WO - No suspicious osseous lesions identified.    2018 - B2 microglobulin:  1.8 mg/L (ref 0.6-2.4). CBC, CMP unremarkable   - Immunoglobulins: Normal   - LDH: 178 ()    - SPEP- Total protein normal.  Apparent normal immunofixation pattern. Normal FLC ratio     18 - MRI C-spine WO - No focal lesions observed  18 - MRI L-Spine W/WO - No focal lesions observed  18 - MRI brain W/Wo- Normal study  10/24/2018 -  Normal immunoglobulins, LDH. Normal SPEP and MIRNA. No M spike seen. Normal FLC ratio     She c/o chronic back pain, denies any new complaints.      She has PMHx of L hip replacements in  and , B/L carpal tunnel s/p surgery, R rotator cuff tear and trigger finger. She is a non smoker and drinks alcohol socially     She has family history of skin cancer and lung in grand mother      Subjective:    Initial History: Ms. Blair is a 57 y.o. female who returns for follow up.  Missed visits previously. Reported multiple chronic issues including joint pain, fatigue.    Past Medical History:   Past Medical History:   Diagnosis Date    Cancer     Plasmacytoma     Rotator cuff disorder     Spinal stenosis        Past Surgical HIstory:   Past Surgical History:   Procedure Laterality Date     SECTION      corpal tunnel      JOINT REPLACEMENT      marquisear sugery      ROTATOR CUFF REPAIR      TONSILLECTOMY      TOTAL HIP ARTHROPLASTY         Family History:   Family History   Problem Relation Age of Onset    Heart disease Mother     Heart disease Father     No Known Problems Sister     No Known Problems Brother     No Known Problems Maternal Aunt     No Known Problems Maternal Uncle     No Known Problems Paternal Aunt     No Known Problems Paternal Uncle     Cataracts Maternal Grandmother     No Known Problems Maternal Grandfather     No Known Problems Paternal Grandmother     No Known Problems Paternal Grandfather      Amblyopia Neg Hx     Blindness Neg Hx     Cancer Neg Hx     Diabetes Neg Hx     Glaucoma Neg Hx     Hypertension Neg Hx     Macular degeneration Neg Hx     Retinal detachment Neg Hx     Strabismus Neg Hx     Stroke Neg Hx     Thyroid disease Neg Hx        Social History:  reports that she has never smoked. She has never used smokeless tobacco. She reports current alcohol use. She reports that she does not use drugs.    Allergies:  Review of patient's allergies indicates:  No Known Allergies    Medications:  Current Outpatient Medications   Medication Sig Dispense Refill    amitriptyline (ELAVIL) 10 MG tablet Take 1 tablet (10 mg total) by mouth every evening. 30 tablet 11    celecoxib (CELEBREX) 200 MG capsule Take 200 mg by mouth 2 (two) times daily.      cyclobenzaprine (FLEXERIL) 5 MG tablet Take 1 tablet (5 mg total) by mouth 3 (three) times daily as needed for Muscle spasms. 60 tablet 3    DULoxetine (CYMBALTA) 60 MG capsule Take 60 mg by mouth once daily.       estradioL (ESTRACE) 0.01 % (0.1 mg/gram) vaginal cream Place 1 g vaginally nightly.      nystatin-triamcinolone (MYCOLOG II) cream Apply topically as needed.      solifenacin (VESICARE) 10 MG tablet Take 10 mg by mouth.      tiZANidine (ZANAFLEX) 4 MG tablet Take 4 mg by mouth 3 (three) times daily as needed.       No current facility-administered medications for this visit.       Review of Systems   Constitutional:  Negative for appetite change, chills, diaphoresis and fever.   HENT:  Negative for nosebleeds and trouble swallowing.    Respiratory:  Negative for cough and shortness of breath.    Cardiovascular:  Negative for chest pain.   Gastrointestinal:  Negative for abdominal pain, blood in stool, diarrhea, nausea and vomiting.   Genitourinary:  Negative for hematuria.   Musculoskeletal:  Positive for back pain and neck pain.   Skin:  Negative for rash.   Neurological:  Negative for seizures, syncope and headaches.   Hematological:  Negative for  "adenopathy.   Psychiatric/Behavioral:  Negative for agitation and behavioral problems. The patient is nervous/anxious.      ECOG Performance Status: 1   Objective:      Vitals:   Vitals:    10/03/22 1511   BP: (!) 123/55   Pulse: 87   Resp: 16   Temp: 98.9 °F (37.2 °C)   SpO2: 96%   Height: 5' 2" (1.575 m)       BMI: Body mass index is 34.76 kg/m².    Physical Exam  Constitutional:       Appearance: Normal appearance.   HENT:      Head: Normocephalic and atraumatic.      Nose: Nose normal.      Mouth/Throat:      Mouth: Mucous membranes are moist.   Eyes:      Extraocular Movements: Extraocular movements intact.      Pupils: Pupils are equal, round, and reactive to light.   Cardiovascular:      Rate and Rhythm: Normal rate and regular rhythm.   Pulmonary:      Effort: Pulmonary effort is normal.      Breath sounds: Normal breath sounds.   Abdominal:      General: Abdomen is flat. Bowel sounds are normal.      Palpations: Abdomen is soft.   Musculoskeletal:         General: No swelling. Normal range of motion.      Cervical back: Normal range of motion and neck supple.   Skin:     General: Skin is warm and dry.      Capillary Refill: Capillary refill takes less than 2 seconds.   Neurological:      General: No focal deficit present.      Mental Status: She is alert and oriented to person, place, and time.   Psychiatric:         Mood and Affect: Mood normal.         Laboratory Data:  No visits with results within 1 Week(s) from this visit.   Latest known visit with results is:   Hospital Outpatient Visit on 09/23/2022   Component Date Value Ref Range Status    POCT Glucose 09/23/2022 98  70 - 110 mg/dL Final         Imaging:       PET/CT 12/20/2019  1.  No definite evidence of active osseous disease.  In this patient with L3 plasmacytoma status post radiation therapy, there is mild nonspecific uptake about the L3 vertebroplasty cement favored to represent postoperative inflammation status post removal of lumbar fusion " hardware.  Attention on follow-up recommended.    2.  No evidence of extramedullary disease.  Mild soft tissue thickening overlying the posterior paraspinous musculature at the L2-L4 level status post removal of hardware.  Mild tracer uptake within this region compatible with soft tissue infectious/noninfectious inflammation.    Assessment:       1. 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 Solitary plasmacytoma in remission        1. 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 Her recent PET/CT done on 12/20/2019 did not suggest any recurrent or metastatic disease  Her Myeloma labs have been negative so far. Previous imaging has been negative for any lytic lesions  No signs of local recurrent/systemic myeloma/other plasmacytoma  Today's SPEP/FLC/MIRNA all pending. Her CBC, CMP, LDH, Immunoglobulins are normal       2. Body pain all over: c/o chronic pain in the back, shoulders, hands and legs. She was diagnosed with fibromyalgia and currently on Cymbalta with moderate improvement. She tried gabapentin and Lyrica in the past with little help  3. Bilateral Carpal tunnel syndrome s/p surgery       Plan:     1. Her recent CBC, CMP, LDH, Immunoglobulins are normal. Myeloma labs are pending  2.  PET CT on 09/23/22 - wnl. Seen lesion on previous MRI likely too small to characterize with PET. No need of additional imaging at this time  3. Rheumatology referral for joint pain - per patient not controlled with supportive measures  4. Right kidney lesion - F/u with nephrologist on 04/08/22  5. GI issues- Upper endoscopy/colonoscopy done on 03/22. Removed multiple benign polyp      BMT Chart Routing      Follow up with physician 6 months.    Follow up with AMANDA    Infusion scheduling note    Injection scheduling note    Labs    Imaging    CBC, CMP, myeloma labs, LDH, uric acid and appoinment   Pharmacy appointment    Other referrals Additional referrals needed          Carol Ann  Tiffany, NP  Hematology/Oncology/BMT

## 2022-10-03 ENCOUNTER — OFFICE VISIT (OUTPATIENT)
Dept: HEMATOLOGY/ONCOLOGY | Facility: CLINIC | Age: 57
End: 2022-10-03
Payer: COMMERCIAL

## 2022-10-03 ENCOUNTER — LAB VISIT (OUTPATIENT)
Dept: LAB | Facility: HOSPITAL | Age: 57
End: 2022-10-03
Attending: NURSE PRACTITIONER
Payer: COMMERCIAL

## 2022-10-03 ENCOUNTER — PATIENT MESSAGE (OUTPATIENT)
Dept: HEMATOLOGY/ONCOLOGY | Facility: CLINIC | Age: 57
End: 2022-10-03

## 2022-10-03 VITALS
SYSTOLIC BLOOD PRESSURE: 123 MMHG | RESPIRATION RATE: 16 BRPM | TEMPERATURE: 99 F | HEART RATE: 87 BPM | BODY MASS INDEX: 34.76 KG/M2 | HEIGHT: 62 IN | OXYGEN SATURATION: 96 % | DIASTOLIC BLOOD PRESSURE: 55 MMHG

## 2022-10-03 DIAGNOSIS — C90.31 SOLITARY PLASMACYTOMA IN REMISSION: ICD-10-CM

## 2022-10-03 DIAGNOSIS — C90.32 SOLITARY PLASMACYTOMA IN RELAPSE: Primary | ICD-10-CM

## 2022-10-03 LAB
IGA SERPL-MCNC: 83 MG/DL (ref 40–350)
IGG SERPL-MCNC: 906 MG/DL (ref 650–1600)
IGM SERPL-MCNC: 120 MG/DL (ref 50–300)

## 2022-10-03 PROCEDURE — 86334 IMMUNOFIX E-PHORESIS SERUM: CPT | Mod: 26,,, | Performed by: PATHOLOGY

## 2022-10-03 PROCEDURE — 1159F MED LIST DOCD IN RCRD: CPT | Mod: CPTII,S$GLB,, | Performed by: NURSE PRACTITIONER

## 2022-10-03 PROCEDURE — 86334 IMMUNOFIX E-PHORESIS SERUM: CPT | Performed by: NURSE PRACTITIONER

## 2022-10-03 PROCEDURE — 36415 COLL VENOUS BLD VENIPUNCTURE: CPT | Performed by: NURSE PRACTITIONER

## 2022-10-03 PROCEDURE — 1160F RVW MEDS BY RX/DR IN RCRD: CPT | Mod: CPTII,S$GLB,, | Performed by: NURSE PRACTITIONER

## 2022-10-03 PROCEDURE — 86334 PATHOLOGIST INTERPRETATION IFE: ICD-10-PCS | Mod: 26,,, | Performed by: PATHOLOGY

## 2022-10-03 PROCEDURE — 99999 PR PBB SHADOW E&M-EST. PATIENT-LVL IV: ICD-10-PCS | Mod: PBBFAC,,, | Performed by: NURSE PRACTITIONER

## 2022-10-03 PROCEDURE — 1160F PR REVIEW ALL MEDS BY PRESCRIBER/CLIN PHARMACIST DOCUMENTED: ICD-10-PCS | Mod: CPTII,S$GLB,, | Performed by: NURSE PRACTITIONER

## 2022-10-03 PROCEDURE — 3008F BODY MASS INDEX DOCD: CPT | Mod: CPTII,S$GLB,, | Performed by: NURSE PRACTITIONER

## 2022-10-03 PROCEDURE — 3078F DIAST BP <80 MM HG: CPT | Mod: CPTII,S$GLB,, | Performed by: NURSE PRACTITIONER

## 2022-10-03 PROCEDURE — 82784 ASSAY IGA/IGD/IGG/IGM EACH: CPT | Mod: 59 | Performed by: NURSE PRACTITIONER

## 2022-10-03 PROCEDURE — 3078F PR MOST RECENT DIASTOLIC BLOOD PRESSURE < 80 MM HG: ICD-10-PCS | Mod: CPTII,S$GLB,, | Performed by: NURSE PRACTITIONER

## 2022-10-03 PROCEDURE — 99214 PR OFFICE/OUTPT VISIT, EST, LEVL IV, 30-39 MIN: ICD-10-PCS | Mod: S$GLB,,, | Performed by: NURSE PRACTITIONER

## 2022-10-03 PROCEDURE — 3074F PR MOST RECENT SYSTOLIC BLOOD PRESSURE < 130 MM HG: ICD-10-PCS | Mod: CPTII,S$GLB,, | Performed by: NURSE PRACTITIONER

## 2022-10-03 PROCEDURE — 83520 IMMUNOASSAY QUANT NOS NONAB: CPT | Performed by: NURSE PRACTITIONER

## 2022-10-03 PROCEDURE — 99214 OFFICE O/P EST MOD 30 MIN: CPT | Mod: S$GLB,,, | Performed by: NURSE PRACTITIONER

## 2022-10-03 PROCEDURE — 3008F PR BODY MASS INDEX (BMI) DOCUMENTED: ICD-10-PCS | Mod: CPTII,S$GLB,, | Performed by: NURSE PRACTITIONER

## 2022-10-03 PROCEDURE — 3074F SYST BP LT 130 MM HG: CPT | Mod: CPTII,S$GLB,, | Performed by: NURSE PRACTITIONER

## 2022-10-03 PROCEDURE — 99999 PR PBB SHADOW E&M-EST. PATIENT-LVL IV: CPT | Mod: PBBFAC,,, | Performed by: NURSE PRACTITIONER

## 2022-10-03 PROCEDURE — 1159F PR MEDICATION LIST DOCUMENTED IN MEDICAL RECORD: ICD-10-PCS | Mod: CPTII,S$GLB,, | Performed by: NURSE PRACTITIONER

## 2022-10-03 RX ORDER — CELECOXIB 200 MG/1
200 CAPSULE ORAL 2 TIMES DAILY
COMMUNITY
Start: 2022-08-29 | End: 2024-02-27

## 2022-10-03 RX ORDER — TIZANIDINE 4 MG/1
4 TABLET ORAL 3 TIMES DAILY PRN
COMMUNITY
Start: 2022-08-29

## 2022-10-04 LAB
INTERPRETATION SERPL IFE-IMP: NORMAL
KAPPA LC SER QL IA: 1.32 MG/DL (ref 0.33–1.94)
KAPPA LC/LAMBDA SER IA: 0.58 (ref 0.26–1.65)
LAMBDA LC SER QL IA: 2.28 MG/DL (ref 0.57–2.63)

## 2022-10-05 LAB — PATHOLOGIST INTERPRETATION IFE: NORMAL

## 2022-10-16 ENCOUNTER — OFFICE VISIT (OUTPATIENT)
Dept: URGENT CARE | Facility: CLINIC | Age: 57
End: 2022-10-16
Payer: COMMERCIAL

## 2022-10-16 VITALS
HEIGHT: 62 IN | HEART RATE: 80 BPM | BODY MASS INDEX: 34.96 KG/M2 | SYSTOLIC BLOOD PRESSURE: 131 MMHG | OXYGEN SATURATION: 98 % | RESPIRATION RATE: 16 BRPM | DIASTOLIC BLOOD PRESSURE: 85 MMHG | WEIGHT: 190 LBS | TEMPERATURE: 99 F

## 2022-10-16 DIAGNOSIS — J02.9 EXUDATIVE PHARYNGITIS: Primary | ICD-10-CM

## 2022-10-16 DIAGNOSIS — J02.9 SORE THROAT: ICD-10-CM

## 2022-10-16 LAB
CTP QC/QA: YES
CTP QC/QA: YES
MOLECULAR STREP A: NEGATIVE
POC MOLECULAR INFLUENZA A AGN: NEGATIVE
POC MOLECULAR INFLUENZA B AGN: NEGATIVE

## 2022-10-16 PROCEDURE — 1159F MED LIST DOCD IN RCRD: CPT | Mod: CPTII,S$GLB,,

## 2022-10-16 PROCEDURE — 1160F PR REVIEW ALL MEDS BY PRESCRIBER/CLIN PHARMACIST DOCUMENTED: ICD-10-PCS | Mod: CPTII,S$GLB,,

## 2022-10-16 PROCEDURE — 87502 INFLUENZA DNA AMP PROBE: CPT | Mod: QW,S$GLB,,

## 2022-10-16 PROCEDURE — 87651 STREP A DNA AMP PROBE: CPT | Mod: QW,S$GLB,,

## 2022-10-16 PROCEDURE — 1160F RVW MEDS BY RX/DR IN RCRD: CPT | Mod: CPTII,S$GLB,,

## 2022-10-16 PROCEDURE — 3079F PR MOST RECENT DIASTOLIC BLOOD PRESSURE 80-89 MM HG: ICD-10-PCS | Mod: CPTII,S$GLB,,

## 2022-10-16 PROCEDURE — 87502 POCT INFLUENZA A/B MOLECULAR: ICD-10-PCS | Mod: QW,S$GLB,,

## 2022-10-16 PROCEDURE — 87651 POCT STREP A MOLECULAR: ICD-10-PCS | Mod: QW,S$GLB,,

## 2022-10-16 PROCEDURE — 99213 PR OFFICE/OUTPT VISIT, EST, LEVL III, 20-29 MIN: ICD-10-PCS | Mod: S$GLB,,,

## 2022-10-16 PROCEDURE — 3079F DIAST BP 80-89 MM HG: CPT | Mod: CPTII,S$GLB,,

## 2022-10-16 PROCEDURE — 3075F PR MOST RECENT SYSTOLIC BLOOD PRESS GE 130-139MM HG: ICD-10-PCS | Mod: CPTII,S$GLB,,

## 2022-10-16 PROCEDURE — 3075F SYST BP GE 130 - 139MM HG: CPT | Mod: CPTII,S$GLB,,

## 2022-10-16 PROCEDURE — 1159F PR MEDICATION LIST DOCUMENTED IN MEDICAL RECORD: ICD-10-PCS | Mod: CPTII,S$GLB,,

## 2022-10-16 PROCEDURE — 99213 OFFICE O/P EST LOW 20 MIN: CPT | Mod: S$GLB,,,

## 2022-10-16 RX ORDER — AMOXICILLIN 500 MG/1
500 TABLET, FILM COATED ORAL EVERY 12 HOURS
Qty: 14 TABLET | Refills: 0 | Status: SHIPPED | OUTPATIENT
Start: 2022-10-16 | End: 2022-10-23

## 2022-10-16 RX ORDER — PREDNISONE 20 MG/1
20 TABLET ORAL DAILY
Qty: 4 TABLET | Refills: 0 | Status: SHIPPED | OUTPATIENT
Start: 2022-10-16 | End: 2022-10-20

## 2022-10-16 NOTE — PROGRESS NOTES
"Subjective:       Patient ID: Mary Blair is a 57 y.o. female.    Vitals:  height is 5' 2" (1.575 m) and weight is 86.2 kg (190 lb).     Chief Complaint: Sore Throat and Headache    Pt is coming in today with a sever sore throat and headache.     Sore Throat   This is a new problem. Associated symptoms include headaches.   Headache   Associated symptoms include a sore throat.     HENT:  Positive for sore throat.    Neurological:  Positive for headaches.     Objective:      Physical Exam      Assessment:       No diagnosis found.      Plan:         There are no diagnoses linked to this encounter.                 "

## 2022-10-16 NOTE — PROGRESS NOTES
"Subjective:       Patient ID: Mary Blair is a 57 y.o. female.    Vitals:  height is 5' 2" (1.575 m) and weight is 86.2 kg (190 lb). Her temperature is 98.7 °F (37.1 °C). Her blood pressure is 131/85 and her pulse is 80. Her respiration is 16 and oxygen saturation is 98%.     Chief Complaint: Sore Throat and Headache    57-year-old female complains of severe sore throat, sinus pressure, ear pain, cough.  Ear pain radiates into her jaw.  Patient has tried Tylenol for the symptoms.  Symptoms started about 2 days ago with worsening sore throat today.  She also notes some exudates on her tonsils.  Patient states that she has been febrile at home and took Tylenol to help reduce the fever about 2 hours prior to arrival    Sore Throat   This is a new problem. The current episode started in the past 7 days. The problem has been gradually worsening. The pain is worse on the right side. The pain is at a severity of 8/10. The pain is severe. Associated symptoms include abdominal pain, congestion, coughing, ear pain, a plugged ear sensation, swollen glands and trouble swallowing. Pertinent negatives include no diarrhea or vomiting. She has tried acetaminophen for the symptoms. The treatment provided mild relief.     HENT:  Positive for ear pain, congestion, sore throat and trouble swallowing.    Respiratory:  Positive for cough.    Gastrointestinal:  Positive for abdominal pain. Negative for vomiting and diarrhea.     Objective:      Physical Exam   Constitutional: She is oriented to person, place, and time. She appears well-developed. She is cooperative.  Non-toxic appearance. She does not appear ill. No distress.   HENT:   Head: Normocephalic and atraumatic.   Ears:   Right Ear: Hearing, tympanic membrane, external ear and ear canal normal.   Left Ear: Hearing, tympanic membrane, external ear and ear canal normal.   Nose: Nose normal. No mucosal edema, rhinorrhea or nasal deformity. No epistaxis. Right sinus " exhibits no maxillary sinus tenderness and no frontal sinus tenderness. Left sinus exhibits no maxillary sinus tenderness and no frontal sinus tenderness.   Mouth/Throat: Uvula is midline and mucous membranes are normal. No trismus in the jaw. Normal dentition. No uvula swelling. Oropharyngeal exudate and posterior oropharyngeal erythema present. No cobblestoning. Tonsils are 2+ on the right. Tonsils are 2+ on the left. No tonsillar exudate.   Eyes: Conjunctivae, EOM and lids are normal. Pupils are equal, round, and reactive to light. Right eye exhibits no discharge. Left eye exhibits no discharge. No scleral icterus.   Neck: Trachea normal and phonation normal. Neck supple.   Cardiovascular: Normal rate, regular rhythm, normal heart sounds and normal pulses.   Pulmonary/Chest: Effort normal and breath sounds normal. No respiratory distress.   Abdominal: Normal appearance. Soft.   Musculoskeletal: Normal range of motion.         General: No deformity. Normal range of motion.   Neurological: She is alert and oriented to person, place, and time. She exhibits normal muscle tone. Coordination normal.   Skin: Skin is warm, dry, intact, not diaphoretic and not pale.   Psychiatric: Her speech is normal and behavior is normal. Judgment and thought content normal.   Nursing note and vitals reviewed.      Results for orders placed or performed in visit on 10/16/22   POCT Strep A, Molecular   Result Value Ref Range    Molecular Strep A, POC Negative Negative     Acceptable Yes    POCT Influenza A/B MOLECULAR   Result Value Ref Range    POC Molecular Influenza A Ag Negative Negative, Not Reported    POC Molecular Influenza B Ag Negative Negative, Not Reported     Acceptable Yes        Assessment:       1. Exudative pharyngitis    2. Sore throat          Plan:         Exudative pharyngitis  -     amoxicillin (AMOXIL) 500 MG Tab; Take 1 tablet (500 mg total) by mouth every 12 (twelve) hours. for 7  days  Dispense: 14 tablet; Refill: 0  -     predniSONE (DELTASONE) 20 MG tablet; Take 1 tablet (20 mg total) by mouth once daily. for 4 days  Dispense: 4 tablet; Refill: 0    Sore throat  -     POCT Strep A, Molecular  -     POCT Influenza A/B MOLECULAR

## 2023-08-03 ENCOUNTER — OFFICE VISIT (OUTPATIENT)
Dept: HEMATOLOGY/ONCOLOGY | Facility: CLINIC | Age: 58
End: 2023-08-03
Payer: COMMERCIAL

## 2023-08-03 ENCOUNTER — LAB VISIT (OUTPATIENT)
Dept: LAB | Facility: HOSPITAL | Age: 58
End: 2023-08-03
Attending: NURSE PRACTITIONER
Payer: COMMERCIAL

## 2023-08-03 VITALS
WEIGHT: 150 LBS | HEIGHT: 62 IN | SYSTOLIC BLOOD PRESSURE: 113 MMHG | OXYGEN SATURATION: 98 % | BODY MASS INDEX: 27.6 KG/M2 | TEMPERATURE: 99 F | HEART RATE: 68 BPM | DIASTOLIC BLOOD PRESSURE: 64 MMHG

## 2023-08-03 DIAGNOSIS — C90.31 SOLITARY PLASMACYTOMA IN REMISSION: Primary | ICD-10-CM

## 2023-08-03 DIAGNOSIS — M06.9 RHEUMATOID ARTHRITIS IN REMISSION: ICD-10-CM

## 2023-08-03 DIAGNOSIS — C90.31 SOLITARY PLASMACYTOMA IN REMISSION: ICD-10-CM

## 2023-08-03 LAB
ALBUMIN SERPL BCP-MCNC: 3.9 G/DL (ref 3.5–5.2)
ALP SERPL-CCNC: 66 U/L (ref 55–135)
ALT SERPL W/O P-5'-P-CCNC: 28 U/L (ref 10–44)
ANION GAP SERPL CALC-SCNC: 9 MMOL/L (ref 8–16)
AST SERPL-CCNC: 20 U/L (ref 10–40)
BASOPHILS # BLD AUTO: 0.06 K/UL (ref 0–0.2)
BASOPHILS NFR BLD: 1.1 % (ref 0–1.9)
BILIRUB SERPL-MCNC: 0.5 MG/DL (ref 0.1–1)
BUN SERPL-MCNC: 11 MG/DL (ref 6–20)
CALCIUM SERPL-MCNC: 10 MG/DL (ref 8.7–10.5)
CHLORIDE SERPL-SCNC: 106 MMOL/L (ref 95–110)
CO2 SERPL-SCNC: 26 MMOL/L (ref 23–29)
CREAT SERPL-MCNC: 1 MG/DL (ref 0.5–1.4)
DIFFERENTIAL METHOD: NORMAL
EOSINOPHIL # BLD AUTO: 0.2 K/UL (ref 0–0.5)
EOSINOPHIL NFR BLD: 3.4 % (ref 0–8)
ERYTHROCYTE [DISTWIDTH] IN BLOOD BY AUTOMATED COUNT: 14.2 % (ref 11.5–14.5)
EST. GFR  (NO RACE VARIABLE): >60 ML/MIN/1.73 M^2
GLUCOSE SERPL-MCNC: 101 MG/DL (ref 70–110)
HCT VFR BLD AUTO: 40.6 % (ref 37–48.5)
HGB BLD-MCNC: 13.1 G/DL (ref 12–16)
IGA SERPL-MCNC: 84 MG/DL (ref 40–350)
IGG SERPL-MCNC: 772 MG/DL (ref 650–1600)
IGM SERPL-MCNC: 114 MG/DL (ref 50–300)
IMM GRANULOCYTES # BLD AUTO: 0.01 K/UL (ref 0–0.04)
IMM GRANULOCYTES NFR BLD AUTO: 0.2 % (ref 0–0.5)
LDH SERPL L TO P-CCNC: 178 U/L (ref 110–260)
LYMPHOCYTES # BLD AUTO: 2.2 K/UL (ref 1–4.8)
LYMPHOCYTES NFR BLD: 38.4 % (ref 18–48)
MCH RBC QN AUTO: 30.5 PG (ref 27–31)
MCHC RBC AUTO-ENTMCNC: 32.3 G/DL (ref 32–36)
MCV RBC AUTO: 95 FL (ref 82–98)
MONOCYTES # BLD AUTO: 0.5 K/UL (ref 0.3–1)
MONOCYTES NFR BLD: 8.3 % (ref 4–15)
NEUTROPHILS # BLD AUTO: 2.8 K/UL (ref 1.8–7.7)
NEUTROPHILS NFR BLD: 48.6 % (ref 38–73)
NRBC BLD-RTO: 0 /100 WBC
PLATELET # BLD AUTO: 396 K/UL (ref 150–450)
PMV BLD AUTO: 9.3 FL (ref 9.2–12.9)
POTASSIUM SERPL-SCNC: 3.7 MMOL/L (ref 3.5–5.1)
PROT SERPL-MCNC: 7 G/DL (ref 6–8.4)
RBC # BLD AUTO: 4.29 M/UL (ref 4–5.4)
SODIUM SERPL-SCNC: 141 MMOL/L (ref 136–145)
URATE SERPL-MCNC: 3.9 MG/DL (ref 2.4–5.7)
WBC # BLD AUTO: 5.67 K/UL (ref 3.9–12.7)

## 2023-08-03 PROCEDURE — 1160F RVW MEDS BY RX/DR IN RCRD: CPT | Mod: CPTII,S$GLB,, | Performed by: NURSE PRACTITIONER

## 2023-08-03 PROCEDURE — 99214 OFFICE O/P EST MOD 30 MIN: CPT | Mod: S$GLB,,, | Performed by: NURSE PRACTITIONER

## 2023-08-03 PROCEDURE — 86334 IMMUNOFIX E-PHORESIS SERUM: CPT | Performed by: INTERNAL MEDICINE

## 2023-08-03 PROCEDURE — 3008F PR BODY MASS INDEX (BMI) DOCUMENTED: ICD-10-PCS | Mod: CPTII,S$GLB,, | Performed by: NURSE PRACTITIONER

## 2023-08-03 PROCEDURE — 3078F DIAST BP <80 MM HG: CPT | Mod: CPTII,S$GLB,, | Performed by: NURSE PRACTITIONER

## 2023-08-03 PROCEDURE — 84550 ASSAY OF BLOOD/URIC ACID: CPT | Performed by: INTERNAL MEDICINE

## 2023-08-03 PROCEDURE — 86334 PATHOLOGIST INTERPRETATION IFE: ICD-10-PCS | Mod: 26,,, | Performed by: PATHOLOGY

## 2023-08-03 PROCEDURE — 3008F BODY MASS INDEX DOCD: CPT | Mod: CPTII,S$GLB,, | Performed by: NURSE PRACTITIONER

## 2023-08-03 PROCEDURE — 82784 ASSAY IGA/IGD/IGG/IGM EACH: CPT | Performed by: INTERNAL MEDICINE

## 2023-08-03 PROCEDURE — 3074F PR MOST RECENT SYSTOLIC BLOOD PRESSURE < 130 MM HG: ICD-10-PCS | Mod: CPTII,S$GLB,, | Performed by: NURSE PRACTITIONER

## 2023-08-03 PROCEDURE — 99999 PR PBB SHADOW E&M-EST. PATIENT-LVL IV: CPT | Mod: PBBFAC,,, | Performed by: NURSE PRACTITIONER

## 2023-08-03 PROCEDURE — 3074F SYST BP LT 130 MM HG: CPT | Mod: CPTII,S$GLB,, | Performed by: NURSE PRACTITIONER

## 2023-08-03 PROCEDURE — 1159F PR MEDICATION LIST DOCUMENTED IN MEDICAL RECORD: ICD-10-PCS | Mod: CPTII,S$GLB,, | Performed by: NURSE PRACTITIONER

## 2023-08-03 PROCEDURE — 99999 PR PBB SHADOW E&M-EST. PATIENT-LVL IV: ICD-10-PCS | Mod: PBBFAC,,, | Performed by: NURSE PRACTITIONER

## 2023-08-03 PROCEDURE — 1160F PR REVIEW ALL MEDS BY PRESCRIBER/CLIN PHARMACIST DOCUMENTED: ICD-10-PCS | Mod: CPTII,S$GLB,, | Performed by: NURSE PRACTITIONER

## 2023-08-03 PROCEDURE — 86334 IMMUNOFIX E-PHORESIS SERUM: CPT | Mod: 26,,, | Performed by: PATHOLOGY

## 2023-08-03 PROCEDURE — 1159F MED LIST DOCD IN RCRD: CPT | Mod: CPTII,S$GLB,, | Performed by: NURSE PRACTITIONER

## 2023-08-03 PROCEDURE — 83521 IG LIGHT CHAINS FREE EACH: CPT | Mod: 59 | Performed by: INTERNAL MEDICINE

## 2023-08-03 PROCEDURE — 80053 COMPREHEN METABOLIC PANEL: CPT | Performed by: INTERNAL MEDICINE

## 2023-08-03 PROCEDURE — 85025 COMPLETE CBC W/AUTO DIFF WBC: CPT | Performed by: INTERNAL MEDICINE

## 2023-08-03 PROCEDURE — 83615 LACTATE (LD) (LDH) ENZYME: CPT | Performed by: INTERNAL MEDICINE

## 2023-08-03 PROCEDURE — 36415 COLL VENOUS BLD VENIPUNCTURE: CPT | Performed by: INTERNAL MEDICINE

## 2023-08-03 PROCEDURE — 3078F PR MOST RECENT DIASTOLIC BLOOD PRESSURE < 80 MM HG: ICD-10-PCS | Mod: CPTII,S$GLB,, | Performed by: NURSE PRACTITIONER

## 2023-08-03 PROCEDURE — 99214 PR OFFICE/OUTPT VISIT, EST, LEVL IV, 30-39 MIN: ICD-10-PCS | Mod: S$GLB,,, | Performed by: NURSE PRACTITIONER

## 2023-08-03 NOTE — PROGRESS NOTES
"  PATIENT: Mary Blair  MRN: [id number]  DATE: 8/3/2023      Diagnosis:   1. Solitary plasmacytoma in remission    2. Rheumatoid arthritis in remission            Chief Complaint: Results (Plasmacytoma - f/u )      Mary Blair is a 53M with PMHx of plasmacytoma of the lumbar spine previously treated at [facility] with plasmacytoma presented to our hematology clinic as a follow up      Follow up 9/15/2020  - C/o chronic back pain much worse than before, neck/shoulder pain, elbow, back pain, ankles all over. Seeing a spine surgeon Dr. Vieyra and had MRI in 6/2020 which revealed mild disc prolapse in lumbar disc  - c/o feeling cold mostly evening, but never had temperature >100.4. No weight loss, vomiting, headaches, diarrhea or bleeding issues  - Currently on cymbalta for fibromyalgia and Tizanidine for back spasms  - Her myeloma labs SPEP, Immunoglobulins, free LT chains, MIRNA are pending  - 12/20/2019- No FDG PET/CT findings to suggest recurrent or metastatic disease    Hematology History   She initially presented with back pain in early February of 2018 and underwent bone scan on 2/27/2018 which revealed Increased uptake within L3 body corresponding to lytic lesion seen on 2/16/18 CT and underwent back surgery on 2/28/2018. A bone marrow biopsy done on 3/8/2018 was negative for any malignancy. However the biopsy of L3 vertebral body lesion revealed lambda restricted plasma cell neoplasm and skeletal survey done on 3/21/2018 revealed "Periprosthetic lucency associated with the left proximal femur potentially reflecting osteolysis rather than malignancy". She underwent ratiation threapy to L3, L4 and L5 of spine from March to May 8, 2018 for a total of 8 weeks due to suspicion for invasion of adjacent vertebrae.      4/11/2018 xray of L-spine revealed no suspicious osseous lesions and intact posterior spinal fusion L2-L4.  4/13/2018 - MIRNA + UPEP- Normal immunofixation pattern  4/26/2018 - CT abdo/pelvis- NO " suspicious osseous lesions identified  2018 - CT Thoracic Spine W/WO - No suspicious osseous lesions identified.    2018 - B2 microglobulin:  1.8 mg/L (ref 0.6-2.4). CBC, CMP unremarkable   - Immunoglobulins: Normal   - LDH: 178 ()    - SPEP- Total protein normal.  Apparent normal immunofixation pattern. Normal FLC ratio     18 - MRI C-spine WO - No focal lesions observed  18 - MRI L-Spine W/WO - No focal lesions observed  18 - MRI brain W/Wo- Normal study  10/24/2018 -  Normal immunoglobulins, LDH. Normal SPEP and MIRNA. No M spike seen. Normal FLC ratio     She c/o chronic back pain, denies any new complaints.      She has PMHx of L hip replacements in  and , B/L carpal tunnel s/p surgery, R rotator cuff tear and trigger finger. She is a non smoker and drinks alcohol socially     She has family history of skin cancer and lung in grand mother      Subjective:    Initial History: Ms. Blair is a 58 y.o. female who returns for follow up. Recent hernia repair, healing lap incisions. Complaints of occasional headache and dizziness associate with that.   chronic issues including joint pain, fatigue. Chronic issues managed by PCP     Past Medical History:   Past Medical History:   Diagnosis Date    Cancer     Plasmacytoma     Rotator cuff disorder     Spinal stenosis        Past Surgical HIstory:   Past Surgical History:   Procedure Laterality Date     SECTION      corpal tunnel      JOINT REPLACEMENT      danielle ulrich      ROTATOR CUFF REPAIR      TONSILLECTOMY      TOTAL HIP ARTHROPLASTY         Family History:   Family History   Problem Relation Age of Onset    Heart disease Mother     Heart disease Father     No Known Problems Sister     No Known Problems Brother     No Known Problems Maternal Aunt     No Known Problems Maternal Uncle     No Known Problems Paternal Aunt     No Known Problems Paternal Uncle     Cataracts Maternal Grandmother     No Known Problems Maternal  Grandfather     No Known Problems Paternal Grandmother     No Known Problems Paternal Grandfather     Amblyopia Neg Hx     Blindness Neg Hx     Cancer Neg Hx     Diabetes Neg Hx     Glaucoma Neg Hx     Hypertension Neg Hx     Macular degeneration Neg Hx     Retinal detachment Neg Hx     Strabismus Neg Hx     Stroke Neg Hx     Thyroid disease Neg Hx        Social History:  reports that she has never smoked. She has never used smokeless tobacco. She reports current alcohol use. She reports that she does not use drugs.    Allergies:  Review of patient's allergies indicates:  No Known Allergies    Medications:  Current Outpatient Medications   Medication Sig Dispense Refill    cyclobenzaprine (FLEXERIL) 5 MG tablet Take 1 tablet (5 mg total) by mouth 3 (three) times daily as needed for Muscle spasms. 60 tablet 3    DULoxetine (CYMBALTA) 60 MG capsule Take 60 mg by mouth once daily.       solifenacin (VESICARE) 10 MG tablet Take 10 mg by mouth.      amitriptyline (ELAVIL) 10 MG tablet Take 1 tablet (10 mg total) by mouth every evening. (Patient not taking: Reported on 8/3/2023) 30 tablet 11    celecoxib (CELEBREX) 200 MG capsule Take 200 mg by mouth 2 (two) times daily.      tiZANidine (ZANAFLEX) 4 MG tablet Take 4 mg by mouth 3 (three) times daily as needed.       No current facility-administered medications for this visit.       Review of Systems   Constitutional:  Negative for appetite change, chills, diaphoresis and fever.   HENT:  Negative for nosebleeds and trouble swallowing.         Occasional headache   Respiratory:  Negative for cough and shortness of breath.    Cardiovascular:  Negative for chest pain.   Gastrointestinal:  Negative for abdominal pain, blood in stool, diarrhea, nausea and vomiting.   Genitourinary:  Negative for hematuria.   Musculoskeletal:  Positive for back pain and neck pain.   Skin:  Negative for rash.   Neurological:  Negative for seizures, syncope and headaches.   Hematological:   "Negative for adenopathy.   Psychiatric/Behavioral:  Negative for agitation and behavioral problems. The patient is nervous/anxious.        ECOG Performance Status: 1   Objective:      Vitals:   Vitals:    08/03/23 0826   BP: 113/64   BP Location: Left arm   Patient Position: Sitting   BP Method: Medium (Automatic)   Pulse: 68   Temp: 98.5 °F (36.9 °C)   TempSrc: Oral   SpO2: 98%   Weight: 68.1 kg (150 lb 0.4 oz)   Height: 5' 2" (1.575 m)       BMI: Body mass index is 27.44 kg/m².    Physical Exam  Constitutional:       Appearance: Normal appearance.   HENT:      Head: Normocephalic and atraumatic.      Nose: Nose normal.      Mouth/Throat:      Mouth: Mucous membranes are moist.   Eyes:      Extraocular Movements: Extraocular movements intact.      Pupils: Pupils are equal, round, and reactive to light.   Cardiovascular:      Rate and Rhythm: Normal rate and regular rhythm.   Pulmonary:      Effort: Pulmonary effort is normal.      Breath sounds: Normal breath sounds.   Abdominal:      General: Abdomen is flat. Bowel sounds are normal.      Palpations: Abdomen is soft.   Musculoskeletal:         General: No swelling. Normal range of motion.      Cervical back: Normal range of motion and neck supple.   Skin:     General: Skin is warm and dry.      Capillary Refill: Capillary refill takes less than 2 seconds.   Neurological:      General: No focal deficit present.      Mental Status: She is alert and oriented to person, place, and time.   Psychiatric:         Mood and Affect: Mood normal.           Laboratory Data:  Lab Visit on 08/03/2023   Component Date Value Ref Range Status    WBC 08/03/2023 5.67  3.90 - 12.70 K/uL Final    RBC 08/03/2023 4.29  4.00 - 5.40 M/uL Final    Hemoglobin 08/03/2023 13.1  12.0 - 16.0 g/dL Final    Hematocrit 08/03/2023 40.6  37.0 - 48.5 % Final    MCV 08/03/2023 95  82 - 98 fL Final    MCH 08/03/2023 30.5  27.0 - 31.0 pg Final    MCHC 08/03/2023 32.3  32.0 - 36.0 g/dL Final    RDW " 08/03/2023 14.2  11.5 - 14.5 % Final    Platelets 08/03/2023 396  150 - 450 K/uL Final    MPV 08/03/2023 9.3  9.2 - 12.9 fL Final    Immature Granulocytes 08/03/2023 0.2  0.0 - 0.5 % Final    Gran # (ANC) 08/03/2023 2.8  1.8 - 7.7 K/uL Final    Immature Grans (Abs) 08/03/2023 0.01  0.00 - 0.04 K/uL Final    Comment: Mild elevation in immature granulocytes is non specific and   can be seen in a variety of conditions including stress response,   acute inflammation, trauma and pregnancy. Correlation with other   laboratory and clinical findings is essential.      Lymph # 08/03/2023 2.2  1.0 - 4.8 K/uL Final    Mono # 08/03/2023 0.5  0.3 - 1.0 K/uL Final    Eos # 08/03/2023 0.2  0.0 - 0.5 K/uL Final    Baso # 08/03/2023 0.06  0.00 - 0.20 K/uL Final    nRBC 08/03/2023 0  0 /100 WBC Final    Gran % 08/03/2023 48.6  38.0 - 73.0 % Final    Lymph % 08/03/2023 38.4  18.0 - 48.0 % Final    Mono % 08/03/2023 8.3  4.0 - 15.0 % Final    Eosinophil % 08/03/2023 3.4  0.0 - 8.0 % Final    Basophil % 08/03/2023 1.1  0.0 - 1.9 % Final    Differential Method 08/03/2023 Automated   Final         Imaging:       PET/CT 12/20/2019  1.  No definite evidence of active osseous disease.  In this patient with L3 plasmacytoma status post radiation therapy, there is mild nonspecific uptake about the L3 vertebroplasty cement favored to represent postoperative inflammation status post removal of lumbar fusion hardware.  Attention on follow-up recommended.    2.  No evidence of extramedullary disease.  Mild soft tissue thickening overlying the posterior paraspinous musculature at the L2-L4 level status post removal of hardware.  Mild tracer uptake within this region compatible with soft tissue infectious/noninfectious inflammation.    Assessment:       1. Solitary plasmacytoma in remission    2. Rheumatoid arthritis in remission          1. 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 Her recent  PET/CT done on 12/20/2019 did not suggest any recurrent or metastatic disease  Her Myeloma labs have been negative so far. Previous imaging has been negative for any lytic lesions  No signs of local recurrent/systemic myeloma/other plasmacytoma  Today's SPEP/FLC/MIRNA all pending. Her CBC, CMP, LDH, Immunoglobulins are normal       2. Body pain all over: c/o chronic pain in the back, shoulders, hands and legs. She was diagnosed with fibromyalgia and currently on Cymbalta with moderate improvement. She tried gabapentin and Lyrica in the past with little help  3. Bilateral Carpal tunnel syndrome s/p surgery       Plan:     1. Her recent CBC normal, CMP, LDH, Immunoglobulins are  Myeloma labs are pending  2.  PET CT on 09/23/22 - wnl. Seen lesion on previous MRI likely too small to characterize with PET. No need of additional imaging at this time  3. Rheumatology referral for joint pain - per patient not controlled with supportive measures. Per patient supportive measures and dietary management. She will f/u with rheumatology regarding this  4. Right kidney lesion - Continue to monitor - Next visit on 04/2024  5. GI issues- Upper endoscopy/colonoscopy done on 03/22. Removed multiple benign polyp - hiatal hernia repair at [facility] on 07/2023 incision healing, stomach issues significantly resolved       BMT Chart Routing      Follow up with physician    Follow up with AMANDA    Provider visit type    Infusion scheduling note    Injection scheduling note 6 months following lab work   Labs   Scheduling:  Preferred lab:  Lab interval:  Cbc, cmp, LDH, light chains, immunoglobulins, SPEP, MIRNA in 6 months   Imaging    Pharmacy appointment    Other referrals             Advance Care Planning     Date: 08/03/2023    Patient did not wish to name a surrogate decision maker or provide an Advance Care Plan.      Carol Ann Allen NP  Hematology/Oncology/BMT

## 2023-08-04 LAB
KAPPA LC SER QL IA: 1.49 MG/DL (ref 0.33–1.94)
KAPPA LC/LAMBDA SER IA: 0.5 (ref 0.26–1.65)
LAMBDA LC SER QL IA: 2.98 MG/DL (ref 0.57–2.63)

## 2023-08-07 LAB
INTERPRETATION SERPL IFE-IMP: NORMAL
PATHOLOGIST INTERPRETATION IFE: NORMAL

## 2023-08-16 ENCOUNTER — PATIENT MESSAGE (OUTPATIENT)
Dept: HEMATOLOGY/ONCOLOGY | Facility: CLINIC | Age: 58
End: 2023-08-16
Payer: COMMERCIAL

## 2023-11-25 ENCOUNTER — LAB VISIT (OUTPATIENT)
Dept: LAB | Facility: HOSPITAL | Age: 58
End: 2023-11-25
Attending: ORTHOPAEDIC SURGERY
Payer: COMMERCIAL

## 2023-11-25 DIAGNOSIS — C90.31 PLASMACYTOMA IN REMISSION, UNSPECIFIED PLASMACYTOMA TYPE: Primary | ICD-10-CM

## 2023-11-25 DIAGNOSIS — M89.9 LESION OF PELVIC BONE: ICD-10-CM

## 2023-11-25 LAB
ANION GAP SERPL CALC-SCNC: 8 MMOL/L (ref 8–16)
BUN SERPL-MCNC: 18 MG/DL (ref 6–20)
CALCIUM SERPL-MCNC: 9.6 MG/DL (ref 8.7–10.5)
CHLORIDE SERPL-SCNC: 106 MMOL/L (ref 95–110)
CO2 SERPL-SCNC: 26 MMOL/L (ref 23–29)
CREAT SERPL-MCNC: 1.1 MG/DL (ref 0.5–1.4)
EST. GFR  (NO RACE VARIABLE): 58 ML/MIN/1.73 M^2
GLUCOSE SERPL-MCNC: 95 MG/DL (ref 70–110)
POTASSIUM SERPL-SCNC: 4.3 MMOL/L (ref 3.5–5.1)
SODIUM SERPL-SCNC: 140 MMOL/L (ref 136–145)

## 2023-11-25 PROCEDURE — 36415 COLL VENOUS BLD VENIPUNCTURE: CPT | Performed by: ORTHOPAEDIC SURGERY

## 2023-11-25 PROCEDURE — 80048 BASIC METABOLIC PNL TOTAL CA: CPT | Performed by: ORTHOPAEDIC SURGERY

## 2023-12-01 ENCOUNTER — HOSPITAL ENCOUNTER (OUTPATIENT)
Dept: RADIOLOGY | Facility: OTHER | Age: 58
Discharge: HOME | End: 2023-12-01
Attending: ORTHOPAEDIC SURGERY
Payer: COMMERCIAL

## 2023-12-01 DIAGNOSIS — M54.6 PAIN IN THORACIC SPINE: ICD-10-CM

## 2023-12-01 DIAGNOSIS — M53.3 PAIN, COCCYX: ICD-10-CM

## 2023-12-01 DIAGNOSIS — M54.50 LUMBAR BACK PAIN: ICD-10-CM

## 2023-12-01 DIAGNOSIS — C90.31 SOLITARY PLASMACYTOMA IN REMISSION: ICD-10-CM

## 2023-12-01 DIAGNOSIS — M54.2 CERVICAL PAIN: ICD-10-CM

## 2023-12-01 DIAGNOSIS — M89.9 LESION OF PELVIC BONE: ICD-10-CM

## 2023-12-01 PROCEDURE — 72072 X-RAY EXAM THORAC SPINE 3VWS: CPT | Mod: 26,,, | Performed by: STUDENT IN AN ORGANIZED HEALTH CARE EDUCATION/TRAINING PROGRAM

## 2023-12-01 PROCEDURE — A9585 GADOBUTROL INJECTION: HCPCS

## 2023-12-01 PROCEDURE — 72072 XR THORACIC SPINE AP LATERAL AND SWIMMERS: ICD-10-PCS | Mod: 26,,, | Performed by: STUDENT IN AN ORGANIZED HEALTH CARE EDUCATION/TRAINING PROGRAM

## 2023-12-01 PROCEDURE — 72040 X-RAY EXAM NECK SPINE 2-3 VW: CPT | Mod: TC,FY

## 2023-12-01 PROCEDURE — 72197 MRI PELVIS W/O & W/DYE: CPT | Mod: TC

## 2023-12-01 PROCEDURE — 72100 X-RAY EXAM L-S SPINE 2/3 VWS: CPT | Mod: 26,,, | Performed by: STUDENT IN AN ORGANIZED HEALTH CARE EDUCATION/TRAINING PROGRAM

## 2023-12-01 PROCEDURE — 72100 XR LUMBAR SPINE 2 OR 3 VIEWS: ICD-10-PCS | Mod: 26,,, | Performed by: STUDENT IN AN ORGANIZED HEALTH CARE EDUCATION/TRAINING PROGRAM

## 2023-12-01 PROCEDURE — 72132 CT LUMBAR SPINE W/DYE: CPT | Mod: TC

## 2023-12-01 PROCEDURE — 72170 XR PELVIS ROUTINE AP: ICD-10-PCS | Mod: 26,,, | Performed by: STUDENT IN AN ORGANIZED HEALTH CARE EDUCATION/TRAINING PROGRAM

## 2023-12-01 PROCEDURE — 25500020 PHARM REV CODE 255

## 2023-12-01 PROCEDURE — 72132 CT LUMBAR SPINE WITH CONTRAST: ICD-10-PCS | Mod: 26,,, | Performed by: STUDENT IN AN ORGANIZED HEALTH CARE EDUCATION/TRAINING PROGRAM

## 2023-12-01 PROCEDURE — 72197 MRI PELVIS W/O & W/DYE: CPT | Mod: 26,,, | Performed by: INTERNAL MEDICINE

## 2023-12-01 PROCEDURE — 76376 PR  3D RENDER W/O IMAGE POSTPROCESS: ICD-10-PCS | Mod: 26,,, | Performed by: STUDENT IN AN ORGANIZED HEALTH CARE EDUCATION/TRAINING PROGRAM

## 2023-12-01 PROCEDURE — 76376 3D RENDER W/INTRP POSTPROCES: CPT | Mod: 26,,, | Performed by: STUDENT IN AN ORGANIZED HEALTH CARE EDUCATION/TRAINING PROGRAM

## 2023-12-01 PROCEDURE — 72100 X-RAY EXAM L-S SPINE 2/3 VWS: CPT | Mod: TC,FY

## 2023-12-01 PROCEDURE — 72170 X-RAY EXAM OF PELVIS: CPT | Mod: 26,,, | Performed by: STUDENT IN AN ORGANIZED HEALTH CARE EDUCATION/TRAINING PROGRAM

## 2023-12-01 PROCEDURE — 72197 MRI PELVIS W WO CONTRAST: ICD-10-PCS | Mod: 26,,, | Performed by: INTERNAL MEDICINE

## 2023-12-01 PROCEDURE — 72132 CT LUMBAR SPINE W/DYE: CPT | Mod: 26,,, | Performed by: STUDENT IN AN ORGANIZED HEALTH CARE EDUCATION/TRAINING PROGRAM

## 2023-12-01 PROCEDURE — 72040 X-RAY EXAM NECK SPINE 2-3 VW: CPT | Mod: 26,,, | Performed by: STUDENT IN AN ORGANIZED HEALTH CARE EDUCATION/TRAINING PROGRAM

## 2023-12-01 PROCEDURE — 72040 XR CERVICAL SPINE 2 OR 3 VIEWS: ICD-10-PCS | Mod: 26,,, | Performed by: STUDENT IN AN ORGANIZED HEALTH CARE EDUCATION/TRAINING PROGRAM

## 2023-12-01 PROCEDURE — 76376 3D RENDER W/INTRP POSTPROCES: CPT | Mod: TC

## 2023-12-01 PROCEDURE — 72170 X-RAY EXAM OF PELVIS: CPT | Mod: TC,FY

## 2023-12-01 RX ORDER — GADOBUTROL 604.72 MG/ML
7 INJECTION INTRAVENOUS
Status: COMPLETED | OUTPATIENT
Start: 2023-12-01 | End: 2023-12-01

## 2023-12-01 RX ADMIN — GADOBUTROL 7 ML: 604.72 INJECTION INTRAVENOUS at 02:12

## 2023-12-01 RX ADMIN — IOHEXOL 75 ML: 350 INJECTION, SOLUTION INTRAVENOUS at 01:12

## 2024-02-02 ENCOUNTER — LAB VISIT (OUTPATIENT)
Dept: LAB | Facility: HOSPITAL | Age: 59
End: 2024-02-02
Attending: INTERNAL MEDICINE
Payer: COMMERCIAL

## 2024-02-02 DIAGNOSIS — C90.31 SOLITARY PLASMACYTOMA IN REMISSION: ICD-10-CM

## 2024-02-02 LAB
ALBUMIN SERPL BCP-MCNC: 3.8 G/DL (ref 3.5–5.2)
ALP SERPL-CCNC: 64 U/L (ref 55–135)
ALT SERPL W/O P-5'-P-CCNC: 16 U/L (ref 10–44)
ANION GAP SERPL CALC-SCNC: 6 MMOL/L (ref 8–16)
AST SERPL-CCNC: 18 U/L (ref 10–40)
BASOPHILS # BLD AUTO: 0.05 K/UL (ref 0–0.2)
BASOPHILS NFR BLD: 1 % (ref 0–1.9)
BILIRUB SERPL-MCNC: 0.3 MG/DL (ref 0.1–1)
BUN SERPL-MCNC: 10 MG/DL (ref 6–20)
CALCIUM SERPL-MCNC: 9.3 MG/DL (ref 8.7–10.5)
CHLORIDE SERPL-SCNC: 107 MMOL/L (ref 95–110)
CO2 SERPL-SCNC: 29 MMOL/L (ref 23–29)
CREAT SERPL-MCNC: 0.9 MG/DL (ref 0.5–1.4)
DIFFERENTIAL METHOD BLD: ABNORMAL
EOSINOPHIL # BLD AUTO: 0.1 K/UL (ref 0–0.5)
EOSINOPHIL NFR BLD: 1.4 % (ref 0–8)
ERYTHROCYTE [DISTWIDTH] IN BLOOD BY AUTOMATED COUNT: 13.2 % (ref 11.5–14.5)
EST. GFR  (NO RACE VARIABLE): >60 ML/MIN/1.73 M^2
GLUCOSE SERPL-MCNC: 86 MG/DL (ref 70–110)
HCT VFR BLD AUTO: 39.5 % (ref 37–48.5)
HGB BLD-MCNC: 12.7 G/DL (ref 12–16)
IGA SERPL-MCNC: 71 MG/DL (ref 40–350)
IGG SERPL-MCNC: 765 MG/DL (ref 650–1600)
IGM SERPL-MCNC: 102 MG/DL (ref 50–300)
IMM GRANULOCYTES # BLD AUTO: 0.02 K/UL (ref 0–0.04)
IMM GRANULOCYTES NFR BLD AUTO: 0.4 % (ref 0–0.5)
LDH SERPL L TO P-CCNC: 179 U/L (ref 110–260)
LYMPHOCYTES # BLD AUTO: 1.6 K/UL (ref 1–4.8)
LYMPHOCYTES NFR BLD: 31.7 % (ref 18–48)
MCH RBC QN AUTO: 31.2 PG (ref 27–31)
MCHC RBC AUTO-ENTMCNC: 32.2 G/DL (ref 32–36)
MCV RBC AUTO: 97 FL (ref 82–98)
MONOCYTES # BLD AUTO: 0.5 K/UL (ref 0.3–1)
MONOCYTES NFR BLD: 8.7 % (ref 4–15)
NEUTROPHILS # BLD AUTO: 2.9 K/UL (ref 1.8–7.7)
NEUTROPHILS NFR BLD: 56.8 % (ref 38–73)
NRBC BLD-RTO: 0 /100 WBC
PLATELET # BLD AUTO: 259 K/UL (ref 150–450)
PMV BLD AUTO: 10.8 FL (ref 9.2–12.9)
POTASSIUM SERPL-SCNC: 4.2 MMOL/L (ref 3.5–5.1)
PROT SERPL-MCNC: 6.6 G/DL (ref 6–8.4)
RBC # BLD AUTO: 4.07 M/UL (ref 4–5.4)
SODIUM SERPL-SCNC: 142 MMOL/L (ref 136–145)
URATE SERPL-MCNC: 3.7 MG/DL (ref 2.4–5.7)
WBC # BLD AUTO: 5.15 K/UL (ref 3.9–12.7)

## 2024-02-02 PROCEDURE — 86334 IMMUNOFIX E-PHORESIS SERUM: CPT | Mod: 26,,, | Performed by: PATHOLOGY

## 2024-02-02 PROCEDURE — 86334 IMMUNOFIX E-PHORESIS SERUM: CPT | Performed by: INTERNAL MEDICINE

## 2024-02-02 PROCEDURE — 83521 IG LIGHT CHAINS FREE EACH: CPT | Performed by: INTERNAL MEDICINE

## 2024-02-02 PROCEDURE — 82784 ASSAY IGA/IGD/IGG/IGM EACH: CPT | Mod: 59 | Performed by: INTERNAL MEDICINE

## 2024-02-02 PROCEDURE — 80053 COMPREHEN METABOLIC PANEL: CPT | Performed by: INTERNAL MEDICINE

## 2024-02-02 PROCEDURE — 84550 ASSAY OF BLOOD/URIC ACID: CPT | Performed by: INTERNAL MEDICINE

## 2024-02-02 PROCEDURE — 83615 LACTATE (LD) (LDH) ENZYME: CPT | Performed by: INTERNAL MEDICINE

## 2024-02-02 PROCEDURE — 36415 COLL VENOUS BLD VENIPUNCTURE: CPT | Performed by: INTERNAL MEDICINE

## 2024-02-02 PROCEDURE — 85025 COMPLETE CBC W/AUTO DIFF WBC: CPT | Performed by: INTERNAL MEDICINE

## 2024-02-05 LAB
INTERPRETATION SERPL IFE-IMP: NORMAL
KAPPA LC SER QL IA: 1.38 MG/DL (ref 0.33–1.94)
KAPPA LC/LAMBDA SER IA: 0.47 (ref 0.26–1.65)
LAMBDA LC SER QL IA: 2.96 MG/DL (ref 0.57–2.63)

## 2024-02-06 LAB — PATHOLOGIST INTERPRETATION IFE: NORMAL

## 2024-02-08 ENCOUNTER — TELEPHONE (OUTPATIENT)
Dept: HEMATOLOGY/ONCOLOGY | Facility: CLINIC | Age: 59
End: 2024-02-08
Payer: COMMERCIAL

## 2024-02-08 NOTE — TELEPHONE ENCOUNTER
Spoke with pt via phone in regards to appt beinf rescheduled from 02/09/ to 02/27. Pt is fine with appt date and time. Pt asked if there is any cancellation call we call her. I let her know we can put her on a wait list for earlier appt    -ANA Mcneil

## 2024-02-26 NOTE — PROGRESS NOTES
"  PATIENT: Mary Blair  MRN: [id number]  DATE: 2/26/2024      Diagnosis:   No diagnosis found.          Chief Complaint: No chief complaint on file.      Mary Blair is a 57 yo F with Hx of plasmacytoma of the lumbar spine, previously treated at [facility] with plasmacytoma .     Follow up 9/15/2020  - C/o chronic back pain much worse than before, neck/shoulder pain, elbow, back pain, ankles all over. Seeing a spine surgeon Dr. Vieyra and had MRI in 6/2020 which revealed mild disc prolapse in lumbar disc  - c/o feeling cold mostly evening, but never had temperature >100.4. No weight loss, vomiting, headaches, diarrhea or bleeding issues  - Currently on cymbalta for fibromyalgia and Tizanidine for back spasms  - Her myeloma labs SPEP, Immunoglobulins, free LT chains, MIRNA are pending  - 12/20/2019- No FDG PET/CT findings to suggest recurrent or metastatic disease    Hematology History   She initially presented with back pain in early February of 2018 and underwent bone scan on 2/27/2018 which revealed Increased uptake within L3 body corresponding to lytic lesion seen on 2/16/18 CT and underwent back surgery on 2/28/2018. A bone marrow biopsy done on 3/8/2018 was negative for any malignancy. However the biopsy of L3 vertebral body lesion revealed lambda restricted plasma cell neoplasm and skeletal survey done on 3/21/2018 revealed "Periprosthetic lucency associated with the left proximal femur potentially reflecting osteolysis rather than malignancy". She underwent ratiation threapy to L3, L4 and L5 of spine from March to May 8, 2018 for a total of 8 weeks due to suspicion for invasion of adjacent vertebrae.      4/11/2018 xray of L-spine revealed no suspicious osseous lesions and intact posterior spinal fusion L2-L4.  4/13/2018 - MIRNA + UPEP- Normal immunofixation pattern  4/26/2018 - CT abdo/pelvis- NO suspicious osseous lesions identified  4/26/2018 - CT Thoracic Spine W/WO - No suspicious osseous lesions " identified.    5/23/2018 - B2 microglobulin:  1.8 mg/L (ref 0.6-2.4). CBC, CMP unremarkable   - Immunoglobulins: Normal   - LDH: 178 ()    - SPEP- Total protein normal.  Apparent normal immunofixation pattern. Normal FLC ratio     8/03/18 - MRI C-spine WO - No focal lesions observed  8/17/18 - MRI L-Spine W/WO - No focal lesions observed  9/24/18 - MRI brain W/Wo- Normal study  10/24/2018 -  Normal immunoglobulins, LDH. Normal SPEP and MIRNA. No M spike seen. Normal FLC ratio     She c/o chronic back pain, denies any new complaints.      She has PMHx of L hip replacements in 1986 and 2010, B/L carpal tunnel s/p surgery, R rotator cuff tear and trigger finger. She is a non smoker and drinks alcohol socially     She has family history of skin cancer and lung in grand mother      Subjective:    Initial History: Ms. Blair is a 58 y.o. female who returns for follow up.     Allergies:  Review of patient's allergies indicates:  No Known Allergies    Medications:  Current Outpatient Medications   Medication Sig Dispense Refill    amitriptyline (ELAVIL) 10 MG tablet Take 1 tablet (10 mg total) by mouth every evening. (Patient not taking: Reported on 8/3/2023) 30 tablet 11    celecoxib (CELEBREX) 200 MG capsule Take 200 mg by mouth 2 (two) times daily.      cyclobenzaprine (FLEXERIL) 5 MG tablet Take 1 tablet (5 mg total) by mouth 3 (three) times daily as needed for Muscle spasms. 60 tablet 3    DULoxetine (CYMBALTA) 60 MG capsule Take 60 mg by mouth once daily.       solifenacin (VESICARE) 10 MG tablet Take 10 mg by mouth.      tiZANidine (ZANAFLEX) 4 MG tablet Take 4 mg by mouth 3 (three) times daily as needed.       No current facility-administered medications for this visit.       Review of Systems   Constitutional:  Negative for appetite change, chills, diaphoresis and fever.   HENT:  Negative for nosebleeds and trouble swallowing.         Occasional headache   Respiratory:  Negative for cough and shortness of  breath.    Cardiovascular:  Negative for chest pain.   Gastrointestinal:  Negative for abdominal pain, blood in stool, diarrhea, nausea and vomiting.   Genitourinary:  Negative for hematuria.   Musculoskeletal:  Positive for back pain and neck pain.   Skin:  Negative for rash.   Neurological:  Negative for seizures, syncope and headaches.   Hematological:  Negative for adenopathy.   Psychiatric/Behavioral:  Negative for agitation and behavioral problems. The patient is nervous/anxious.        Vitals:    24 0824   BP: 126/85   Pulse: 91   Resp: 16   Temp: 98.1 °F (36.7 °C)         Physical Exam  Constitutional:       Appearance: Normal appearance.   HENT:      Head: Normocephalic and atraumatic.      Nose: Nose normal.      Mouth/Throat:      Mouth: Mucous membranes are moist.   Eyes:      Extraocular Movements: Extraocular movements intact.      Pupils: Pupils are equal, round, and reactive to light.   Cardiovascular:      Rate and Rhythm: Normal rate and regular rhythm.   Pulmonary:      Effort: Pulmonary effort is normal.      Breath sounds: Normal breath sounds.   Abdominal:      General: Abdomen is flat. Bowel sounds are normal.      Palpations: Abdomen is soft.   Musculoskeletal:         General: No swelling. Normal range of motion.      Cervical back: Normal range of motion and neck supple.   Skin:     General: Skin is warm and dry.      Capillary Refill: Capillary refill takes less than 2 seconds.   Neurological:      General: No focal deficit present.      Mental Status: She is alert and oriented to person, place, and time.   Psychiatric:         Mood and Affect: Mood normal.         Imagin/20/2019 PET CT    1.  No definite evidence of active osseous disease.  In this patient with L3 plasmacytoma status post radiation therapy, there is mild nonspecific uptake about the L3 vertebroplasty cement favored to represent postoperative inflammation status post removal of lumbar fusion hardware.   Attention on follow-up recommended.    2.  No evidence of extramedullary disease.  Mild soft tissue thickening overlying the posterior paraspinous musculature at the L2-L4 level status post removal of hardware.  Mild tracer uptake within this region compatible with soft tissue infectious/noninfectious inflammation.      9/23/22 PET CT       In this patient with history of L3 vertebral body plasmacytoma status post vertebral augmentation, there is no focal abnormal radiotracer uptake to suggest local recurrence.  No new hypermetabolic disease elsewhere.     No hypermetabolic or CT abnormality correlating with lesion of the right posterior ilium noted on prior MR. Of note, lesion likely too small to characterize with PET.  Recommend attention on expected follow-up exams.      12/1/23 CT lumbar spine    Prior vertebral augmentation of the L3 vertebral body, with extrusion of augmentation material along the ventral aspect of the thecal sac.  Mild heterogeneity about the L3 vertebral body and posterior elements, likely representing sequela of post treatment change.  No evidence for acute fracture or new osseous destructive lesion.  No abnormal epidural enhancement or surrounding abnormal soft tissue enhancement.     Multilevel lumbar spondylosis as detailed above, worst at L4-L5, which contributes to moderate spinal canal stenosis as well as moderate bilateral neural foraminal narrowing.     Grade 1 anterolisthesis of L4 on L5.     Additional findings as above.    12/1/23 MRI pelvis    COMPARISON:  Multiple CTs, most recent 12/01/2023; MRI spine 06/10/2022     FINDINGS:  BONE: Multiple subcentimeter STIR hyperintense lesions scattered throughout the pelvis and right proximal femur measuring up to 0.6 cm, for example in the right posterior ilium on 4:7.  No cortical destruction or extraosseous extension.  The posterior iliac lesion is unchanged from 06/10/2022 spine MRI.  Other lesions were outside the field of view.  No  corresponding abnormalities on previous CTs.     Partially imaged postoperative changes from vertebral augmentation at L3.  There is fatty metaplasia in the lower lumbar spine, compatible with post radiation changes.     No fracture or osteonecrosis.     JOINT: Postoperative changes from left hip arthroplasty.  Susceptibility artifact markedly degrades evaluation of adjacent structures, including perihardware bone.  No joint effusion or synovitis in the right hip.  Multilevel degenerative changes in the lower lumbar spine.     SOFT TISSUE: Partial tear of the right proximal hamstring tendon.  Narrowing of the left ischial femoral space with bony proliferative changes, adventitial bursa formation, and atrophy of quadratus femoris.     MISCELLANEOUS: No visceral pelvic soft tissue abnormality.     Impression:     Partially imaged posttreatment changes in the lumbar spine for an L3 plasmacytoma.     Multiple subcentimeter lesions scattered throughout the pelvis and right proximal femur, concerning for multiple myeloma.  Stability is difficult to determine as many were outside the field of view on previous MRI.  Correlate with lab values.     Left ischiofemoral impingement.     Partial tear of the right proximal hamstring tendon.     Component      Latest Ref Rn 2/2/2024   WBC      3.90 - 12.70 K/uL 5.15    RBC      4.00 - 5.40 M/uL 4.07    Hemoglobin      12.0 - 16.0 g/dL 12.7    Hematocrit      37.0 - 48.5 % 39.5    MCV      82 - 98 fL 97    MCH      27.0 - 31.0 pg 31.2 (H)    MCHC      32.0 - 36.0 g/dL 32.2    RDW      11.5 - 14.5 % 13.2    Platelet Count      150 - 450 K/uL 259    MPV      9.2 - 12.9 fL 10.8    Immature Granulocytes      0.0 - 0.5 % 0.4    Gran # (ANC)      1.8 - 7.7 K/uL 2.9    Immature Grans (Abs)      0.00 - 0.04 K/uL 0.02    Lymph #      1.0 - 4.8 K/uL 1.6    Mono #      0.3 - 1.0 K/uL 0.5    Eos #      0.0 - 0.5 K/uL 0.1    Baso #      0.00 - 0.20 K/uL 0.05    nRBC      0 /100 WBC 0    Gran  %      38.0 - 73.0 % 56.8    Lymph %      18.0 - 48.0 % 31.7    Mono %      4.0 - 15.0 % 8.7    Eos %      0.0 - 8.0 % 1.4    Basophil %      0.0 - 1.9 % 1.0    Differential Method Automated    Sodium      136 - 145 mmol/L 142    Potassium      3.5 - 5.1 mmol/L 4.2    Chloride      95 - 110 mmol/L 107    CO2      23 - 29 mmol/L 29    Glucose      70 - 110 mg/dL 86    BUN      6 - 20 mg/dL 10    Creatinine      0.5 - 1.4 mg/dL 0.9    Calcium      8.7 - 10.5 mg/dL 9.3    PROTEIN TOTAL      6.0 - 8.4 g/dL 6.6    Albumin      3.5 - 5.2 g/dL 3.8    BILIRUBIN TOTAL      0.1 - 1.0 mg/dL 0.3    ALP      55 - 135 U/L 64    AST      10 - 40 U/L 18    ALT      10 - 44 U/L 16    eGFR      >60 mL/min/1.73 m^2 >60.0    Anion Gap      8 - 16 mmol/L 6 (L)    [place] Free Light Chains      0.33 - 1.94 mg/dL 1.38    Lambda Free Light Chains      0.57 - 2.63 mg/dL 2.96 (H)    Kappa/Lambda FLC Ratio      0.26 - 1.65  0.47    IgG      650 - 1600 mg/dL 765    IgA      40 - 350 mg/dL 71    IgM      50 - 300 mg/dL 102    Lactate Dehydrogenase      110 - 260 U/L 179    Uric Acid      2.4 - 5.7 mg/dL 3.7         2/2/24 sIFE: No monoclonal peaks identified.     Assessment:             1. 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  PET/CT done on 12/20/2019 did not suggest any recurrent or metastatic disease. She had no bone or soft tissue lesions suggestive of plasma cytoma in Sept 2022. MRI pelvi sdone in Dec 2022 demonstrated multiple subcentimeter lesions scattered throughout the pelvis and right proximal femur. No signs of local recurrent/systemic myeloma/other plasmacytoma       2. Generalized pain:  c/o chronic pain in the back, shoulders, hands and legs. She was diagnosed with fibromyalgia and currently on Cymbalta with moderate improvement. She tried gabapentin and Lyrica in the past with little benefit.     3. Bilateral Carpal tunnel syndrome : s/p surgery          BMT Chart  Routing      Follow up with physician . After pet   Follow up with AMANDA    Provider visit type    Infusion scheduling note    Injection scheduling note    Labs    Imaging PET scan   pet ct in 1-2 weeks   Pharmacy appointment    Other referrals

## 2024-02-27 ENCOUNTER — OFFICE VISIT (OUTPATIENT)
Dept: HEMATOLOGY/ONCOLOGY | Facility: CLINIC | Age: 59
End: 2024-02-27
Payer: COMMERCIAL

## 2024-02-27 VITALS
WEIGHT: 121.38 LBS | SYSTOLIC BLOOD PRESSURE: 126 MMHG | OXYGEN SATURATION: 98 % | HEIGHT: 62 IN | HEART RATE: 91 BPM | TEMPERATURE: 98 F | BODY MASS INDEX: 22.34 KG/M2 | RESPIRATION RATE: 16 BRPM | DIASTOLIC BLOOD PRESSURE: 85 MMHG

## 2024-02-27 DIAGNOSIS — C90.30 PLASMACYTOMA OF BONE: Primary | ICD-10-CM

## 2024-02-27 DIAGNOSIS — R63.4 WEIGHT LOSS, ABNORMAL: ICD-10-CM

## 2024-02-27 DIAGNOSIS — M89.8X9 BONE PAIN: ICD-10-CM

## 2024-02-27 PROCEDURE — 3079F DIAST BP 80-89 MM HG: CPT | Mod: CPTII,S$GLB,, | Performed by: INTERNAL MEDICINE

## 2024-02-27 PROCEDURE — 3074F SYST BP LT 130 MM HG: CPT | Mod: CPTII,S$GLB,, | Performed by: INTERNAL MEDICINE

## 2024-02-27 PROCEDURE — 99214 OFFICE O/P EST MOD 30 MIN: CPT | Mod: S$GLB,,, | Performed by: INTERNAL MEDICINE

## 2024-02-27 PROCEDURE — 3008F BODY MASS INDEX DOCD: CPT | Mod: CPTII,S$GLB,, | Performed by: INTERNAL MEDICINE

## 2024-02-27 PROCEDURE — 99999 PR PBB SHADOW E&M-EST. PATIENT-LVL III: CPT | Mod: PBBFAC,,, | Performed by: INTERNAL MEDICINE

## 2024-02-27 RX ORDER — CELECOXIB 100 MG/1
100 CAPSULE ORAL
COMMUNITY
Start: 2023-12-15

## 2024-03-07 ENCOUNTER — HOSPITAL ENCOUNTER (OUTPATIENT)
Dept: RADIOLOGY | Facility: HOSPITAL | Age: 59
Discharge: HOME | End: 2024-03-07
Attending: INTERNAL MEDICINE
Payer: COMMERCIAL

## 2024-03-07 DIAGNOSIS — C90.30 PLASMACYTOMA OF BONE: ICD-10-CM

## 2024-03-07 DIAGNOSIS — R63.4 WEIGHT LOSS, ABNORMAL: ICD-10-CM

## 2024-03-07 LAB — POCT GLUCOSE: 90 MG/DL (ref 70–110)

## 2024-03-07 PROCEDURE — 78816 PET IMAGE W/CT FULL BODY: CPT | Mod: 26,PS,, | Performed by: STUDENT IN AN ORGANIZED HEALTH CARE EDUCATION/TRAINING PROGRAM

## 2024-03-07 PROCEDURE — 78816 PET IMAGE W/CT FULL BODY: CPT | Mod: TC

## 2024-03-07 PROCEDURE — A9552 F18 FDG: HCPCS | Performed by: INTERNAL MEDICINE

## 2024-03-07 RX ORDER — FLUDEOXYGLUCOSE F18 500 MCI/ML
12 INJECTION INTRAVENOUS ONCE
Status: COMPLETED | OUTPATIENT
Start: 2024-03-07 | End: 2024-03-07

## 2024-03-07 RX ADMIN — FLUDEOXYGLUCOSE F-18 12.84 MILLICURIE: 500 INJECTION INTRAVENOUS at 07:03

## 2024-03-18 ENCOUNTER — OFFICE VISIT (OUTPATIENT)
Dept: HEMATOLOGY/ONCOLOGY | Facility: CLINIC | Age: 59
End: 2024-03-18
Payer: COMMERCIAL

## 2024-03-18 VITALS
OXYGEN SATURATION: 98 % | SYSTOLIC BLOOD PRESSURE: 112 MMHG | HEIGHT: 62 IN | DIASTOLIC BLOOD PRESSURE: 57 MMHG | RESPIRATION RATE: 18 BRPM | BODY MASS INDEX: 21.35 KG/M2 | WEIGHT: 116 LBS | HEART RATE: 78 BPM | TEMPERATURE: 98 F

## 2024-03-18 DIAGNOSIS — M89.8X9 BONE PAIN: ICD-10-CM

## 2024-03-18 DIAGNOSIS — C90.30 PLASMACYTOMA, UNSPECIFIED PLASMACYTOMA TYPE, UNSPECIFIED WHETHER REMISSION ACHIEVED: ICD-10-CM

## 2024-03-18 DIAGNOSIS — C90.30 PLASMACYTOMA OF BONE: Primary | ICD-10-CM

## 2024-03-18 PROCEDURE — 99999 PR PBB SHADOW E&M-EST. PATIENT-LVL III: CPT | Mod: PBBFAC,,, | Performed by: INTERNAL MEDICINE

## 2024-03-18 PROCEDURE — 3008F BODY MASS INDEX DOCD: CPT | Mod: CPTII,S$GLB,, | Performed by: INTERNAL MEDICINE

## 2024-03-18 PROCEDURE — 1159F MED LIST DOCD IN RCRD: CPT | Mod: CPTII,S$GLB,, | Performed by: INTERNAL MEDICINE

## 2024-03-18 PROCEDURE — 99214 OFFICE O/P EST MOD 30 MIN: CPT | Mod: S$GLB,,, | Performed by: INTERNAL MEDICINE

## 2024-03-18 PROCEDURE — 3074F SYST BP LT 130 MM HG: CPT | Mod: CPTII,S$GLB,, | Performed by: INTERNAL MEDICINE

## 2024-03-18 PROCEDURE — 3078F DIAST BP <80 MM HG: CPT | Mod: CPTII,S$GLB,, | Performed by: INTERNAL MEDICINE

## 2024-03-18 NOTE — H&P (VIEW-ONLY)
"  PATIENT: Mary Blair  MRN: [id number]  DATE: 3/18/2024      Diagnosis:   No diagnosis found.          Chief Complaint: No chief complaint on file.      Mary Blair is a 59 yo F with Hx of plasmacytoma of the lumbar spine, previously treated at [facility] with plasmacytoma .     Follow up 9/15/2020  - C/o chronic back pain much worse than before, neck/shoulder pain, elbow, back pain, ankles all over. Seeing a spine surgeon Dr. Vieyra and had MRI in 6/2020 which revealed mild disc prolapse in lumbar disc  - c/o feeling cold mostly evening, but never had temperature >100.4. No weight loss, vomiting, headaches, diarrhea or bleeding issues  - Currently on cymbalta for fibromyalgia and Tizanidine for back spasms  - Her myeloma labs SPEP, Immunoglobulins, free LT chains, MIRNA are pending  - 12/20/2019- No FDG PET/CT findings to suggest recurrent or metastatic disease    Hematology History   She initially presented with back pain in early February of 2018 and underwent bone scan on 2/27/2018 which revealed Increased uptake within L3 body corresponding to lytic lesion seen on 2/16/18 CT and underwent back surgery on 2/28/2018. A bone marrow biopsy done on 3/8/2018 was negative for any malignancy. However the biopsy of L3 vertebral body lesion revealed lambda restricted plasma cell neoplasm and skeletal survey done on 3/21/2018 revealed "Periprosthetic lucency associated with the left proximal femur potentially reflecting osteolysis rather than malignancy". She underwent ratiation threapy to L3, L4 and L5 of spine from March to May 8, 2018 for a total of 8 weeks due to suspicion for invasion of adjacent vertebrae.      4/11/2018 xray of L-spine revealed no suspicious osseous lesions and intact posterior spinal fusion L2-L4.  4/13/2018 - MIRNA + UPEP- Normal immunofixation pattern  4/26/2018 - CT abdo/pelvis- NO suspicious osseous lesions identified  4/26/2018 - CT Thoracic Spine W/WO - No suspicious osseous lesions " identified.    5/23/2018 - B2 microglobulin:  1.8 mg/L (ref 0.6-2.4). CBC, CMP unremarkable   - Immunoglobulins: Normal   - LDH: 178 ()    - SPEP- Total protein normal.  Apparent normal immunofixation pattern. Normal FLC ratio     8/03/18 - MRI C-spine WO - No focal lesions observed  8/17/18 - MRI L-Spine W/WO - No focal lesions observed  9/24/18 - MRI brain W/Wo- Normal study  10/24/2018 -  Normal immunoglobulins, LDH. Normal SPEP and MIRNA. No M spike seen. Normal FLC ratio     She c/o chronic back pain, denies any new complaints.      She has PMHx of L hip replacements in 1986 and 2010, B/L carpal tunnel s/p surgery, R rotator cuff tear and trigger finger. She is a non smoker and drinks alcohol socially     She has family history of skin cancer and lung in grand mother      Subjective:    Initial History: Ms. Blair is a 58 y.o. female who returns for follow up. She had  PET CT    Allergies:  Review of patient's allergies indicates:  No Known Allergies    Medications:  Current Outpatient Medications   Medication Sig Dispense Refill    CELEBREX 100 mg capsule 100 mg.      cyclobenzaprine (FLEXERIL) 5 MG tablet Take 1 tablet (5 mg total) by mouth 3 (three) times daily as needed for Muscle spasms. 60 tablet 3    DULoxetine (CYMBALTA) 60 MG capsule Take 60 mg by mouth once daily.       tiZANidine (ZANAFLEX) 4 MG tablet Take 4 mg by mouth 3 (three) times daily as needed.      amitriptyline (ELAVIL) 10 MG tablet Take 1 tablet (10 mg total) by mouth every evening. (Patient not taking: Reported on 8/3/2023) 30 tablet 11    solifenacin (VESICARE) 10 MG tablet Take 10 mg by mouth.       No current facility-administered medications for this visit.       Review of Systems   Constitutional:  Negative for appetite change, chills, diaphoresis and fever.   HENT:  Negative for nosebleeds and trouble swallowing.         Occasional headache   Respiratory:  Negative for cough and shortness of breath.    Cardiovascular:  Negative  for chest pain.   Gastrointestinal:  Negative for abdominal pain, blood in stool, diarrhea, nausea and vomiting.   Genitourinary:  Negative for hematuria.   Musculoskeletal:  Positive for back pain and neck pain.   Skin:  Negative for rash.   Neurological:  Negative for seizures, syncope and headaches.   Hematological:  Negative for adenopathy.   Psychiatric/Behavioral:  Negative for agitation and behavioral problems. The patient is nervous/anxious.            Vitals:    24 1056   BP: (!) 112/57   Pulse: 78   Resp: 18   Temp: 98.4 °F (36.9 °C)            Physical Exam  Constitutional:       Appearance: Normal appearance.   HENT:      Head: Normocephalic and atraumatic.      Nose: Nose normal.      Mouth/Throat:      Mouth: Mucous membranes are moist.   Eyes:      Extraocular Movements: Extraocular movements intact.      Pupils: Pupils are equal, round, and reactive to light.   Cardiovascular:      Rate and Rhythm: Normal rate and regular rhythm.   Pulmonary:      Effort: Pulmonary effort is normal.      Breath sounds: Normal breath sounds.   Abdominal:      General: Abdomen is flat. Bowel sounds are normal.      Palpations: Abdomen is soft.   Musculoskeletal:         General: No swelling. Normal range of motion.      Cervical back: Normal range of motion and neck supple.   Skin:     General: Skin is warm and dry.      Capillary Refill: Capillary refill takes less than 2 seconds.   Neurological:      General: No focal deficit present.      Mental Status: She is alert and oriented to person, place, and time.   Psychiatric:         Mood and Affect: Mood normal.           Imagin/20/2019 PET CT    1.  No definite evidence of active osseous disease.  In this patient with L3 plasmacytoma status post radiation therapy, there is mild nonspecific uptake about the L3 vertebroplasty cement favored to represent postoperative inflammation status post removal of lumbar fusion hardware.  Attention on follow-up  recommended.    2.  No evidence of extramedullary disease.  Mild soft tissue thickening overlying the posterior paraspinous musculature at the L2-L4 level status post removal of hardware.  Mild tracer uptake within this region compatible with soft tissue infectious/noninfectious inflammation.      9/23/22 PET CT       In this patient with history of L3 vertebral body plasmacytoma status post vertebral augmentation, there is no focal abnormal radiotracer uptake to suggest local recurrence.  No new hypermetabolic disease elsewhere.     No hypermetabolic or CT abnormality correlating with lesion of the right posterior ilium noted on prior MR. Of note, lesion likely too small to characterize with PET.  Recommend attention on expected follow-up exams.      12/1/23 CT lumbar spine    Prior vertebral augmentation of the L3 vertebral body, with extrusion of augmentation material along the ventral aspect of the thecal sac.  Mild heterogeneity about the L3 vertebral body and posterior elements, likely representing sequela of post treatment change.  No evidence for acute fracture or new osseous destructive lesion.  No abnormal epidural enhancement or surrounding abnormal soft tissue enhancement.     Multilevel lumbar spondylosis as detailed above, worst at L4-L5, which contributes to moderate spinal canal stenosis as well as moderate bilateral neural foraminal narrowing.     Grade 1 anterolisthesis of L4 on L5.     Additional findings as above.    12/1/23 MRI pelvis    COMPARISON:  Multiple CTs, most recent 12/01/2023; MRI spine 06/10/2022     FINDINGS:  BONE: Multiple subcentimeter STIR hyperintense lesions scattered throughout the pelvis and right proximal femur measuring up to 0.6 cm, for example in the right posterior ilium on 4:7.  No cortical destruction or extraosseous extension.  The posterior iliac lesion is unchanged from 06/10/2022 spine MRI.  Other lesions were outside the field of view.  No corresponding  abnormalities on previous CTs.     Partially imaged postoperative changes from vertebral augmentation at L3.  There is fatty metaplasia in the lower lumbar spine, compatible with post radiation changes.     No fracture or osteonecrosis.     JOINT: Postoperative changes from left hip arthroplasty.  Susceptibility artifact markedly degrades evaluation of adjacent structures, including perihardware bone.  No joint effusion or synovitis in the right hip.  Multilevel degenerative changes in the lower lumbar spine.     SOFT TISSUE: Partial tear of the right proximal hamstring tendon.  Narrowing of the left ischial femoral space with bony proliferative changes, adventitial bursa formation, and atrophy of quadratus femoris.     MISCELLANEOUS: No visceral pelvic soft tissue abnormality.     Impression:     Partially imaged posttreatment changes in the lumbar spine for an L3 plasmacytoma.     Multiple subcentimeter lesions scattered throughout the pelvis and right proximal femur, concerning for multiple myeloma.  Stability is difficult to determine as many were outside the field of view on previous MRI.  Correlate with lab values.     Left ischiofemoral impingement.     Partial tear of the right proximal hamstring tendon.       2/2/24 sIFE: No monoclonal peaks identified.       3/7/24 PET CT  COMPARISON:  PET-CT 09/23/2022, 09/23/2020, MRI pelvis 12/01/2023     FINDINGS:  Quality of the study: Adequate.     In the head and neck, there are no hypermetabolic mucosal lesions, and there are no pathologically enlarged or hypermetabolic lymph nodes.     In the chest, there are no hypermetabolic lesions worrisome for malignancy.  There are no concerning pulmonary nodules or masses, and there are no pathologically enlarged or hypermetabolic lymph nodes.     In the abdomen and pelvis, there is physiologic tracer distribution within the abdominal organs and excretion into the genitourinary system.     In the bones, there is a new small  hypermetabolic lytic lesion in the right occipital condyle (image 51) measuring 0.8 cm with SUV max 4.     New mild radiotracer uptake corresponding to a small lytic lesion in the right posterior ilium (image 214) with SUV max 2.7, with corresponding lesion identified on prior MRI.     New mild radiotracer uptake corresponding with a punctate lucent focus within the right iliac bone near the sacroiliac joint (image 227) with maximum SUV of 2.3, with corresponding lesion identified on prior MRI.     Stable appearance of the L3 vertebral body status post vertebral augmentation, with normal background level uptake.     In the extremities, there are no hypermetabolic lesions worrisome for malignancy.     Increased physiologic skeletal muscle uptake within the bilateral upper and lower extremities.     Additional CT findings: Surgical clips adjacent to the spleen.  Small fat containing umbilical hernia.  Left hip arthroplasty.     Impression:     New small hypermetabolic lytic lesion in the right occipital condyle.  New mild radiotracer uptake corresponding to small lytic foci in the right iliac bone, with corresponding lesions identified on prior MRI.  Findings are concerning for myeloma.     Stable appearance of the L3 vertebral body status post vertebral augmentation, with normal background level uptake.           Component      Latest Ref Rng 2/2/2024   WBC      3.90 - 12.70 K/uL 5.15    RBC      4.00 - 5.40 M/uL 4.07    Hemoglobin      12.0 - 16.0 g/dL 12.7    Hematocrit      37.0 - 48.5 % 39.5    MCV      82 - 98 fL 97    MCH      27.0 - 31.0 pg 31.2 (H)    MCHC      32.0 - 36.0 g/dL 32.2    RDW      11.5 - 14.5 % 13.2    Platelet Count      150 - 450 K/uL 259    MPV      9.2 - 12.9 fL 10.8    Immature Granulocytes      0.0 - 0.5 % 0.4    Gran # (ANC)      1.8 - 7.7 K/uL 2.9    Immature Grans (Abs)      0.00 - 0.04 K/uL 0.02    Lymph #      1.0 - 4.8 K/uL 1.6    Mono #      0.3 - 1.0 K/uL 0.5    Eos #      0.0 -  0.5 K/uL 0.1    Baso #      0.00 - 0.20 K/uL 0.05    nRBC      0 /100 WBC 0    Gran %      38.0 - 73.0 % 56.8    Lymph %      18.0 - 48.0 % 31.7    Mono %      4.0 - 15.0 % 8.7    Eos %      0.0 - 8.0 % 1.4    Basophil %      0.0 - 1.9 % 1.0    Differential Method Automated    Sodium      136 - 145 mmol/L 142    Potassium      3.5 - 5.1 mmol/L 4.2    Chloride      95 - 110 mmol/L 107    CO2      23 - 29 mmol/L 29    Glucose      70 - 110 mg/dL 86    BUN      6 - 20 mg/dL 10    Creatinine      0.5 - 1.4 mg/dL 0.9    Calcium      8.7 - 10.5 mg/dL 9.3    PROTEIN TOTAL      6.0 - 8.4 g/dL 6.6    Albumin      3.5 - 5.2 g/dL 3.8    BILIRUBIN TOTAL      0.1 - 1.0 mg/dL 0.3    ALP      55 - 135 U/L 64    AST      10 - 40 U/L 18    ALT      10 - 44 U/L 16    eGFR      >60 mL/min/1.73 m^2 >60.0    Anion Gap      8 - 16 mmol/L 6 (L)    Trosky Free Light Chains      0.33 - 1.94 mg/dL 1.38    Lambda Free Light Chains      0.57 - 2.63 mg/dL 2.96 (H)    Kappa/Lambda FLC Ratio      0.26 - 1.65  0.47    IgG      650 - 1600 mg/dL 765    IgA      40 - 350 mg/dL 71    IgM      50 - 300 mg/dL 102    Lactate Dehydrogenase      110 - 260 U/L 179    Uric Acid      2.4 - 5.7 mg/dL 3.7         2/2/24 sIFE: No monoclonal peaks identified.       Assessment:             1. 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  PET/CT done on 12/20/2019 did not suggest any recurrent or metastatic disease. She had no bone or soft tissue lesions suggestive of plasma cytoma in Sept 2022. MRI pelvis done in Dec 2022 demonstrated multiple subcentimeter lesions scattered throughout the pelvis and right proximal femur.   PET CT done on 3/7/24 showed a new small hypermetabolic lytic lesion in the right occipital condyle. There was a new mild radiotracer uptake corresponding to small lytic foci in the right iliac bone.  No monoclonal protein on serum MIRNA on 2/2/24. CBC, CMP normal. sFLC ratio normal. A BM biopsy  will be scheduled.          2. Generalized pain:  c/o chronic pain in the back, shoulders, hands and legs. She was diagnosed with fibromyalgia and currently on Cymbalta with moderate improvement. She tried gabapentin and Lyrica in the past with little benefit.     3. Bilateral Carpal tunnel syndrome : s/p surgery              BMT Chart Routing      Follow up with physician . Bone amrrow biopsuy under sedation-next available; follow up after   Follow up with AMANDA    Provider visit type    Infusion scheduling note    Injection scheduling note    Labs    Imaging    Pharmacy appointment    Other referrals

## 2024-03-18 NOTE — PROGRESS NOTES
"  PATIENT: Mary Blair  MRN: [id number]  DATE: 3/18/2024      Diagnosis:   No diagnosis found.          Chief Complaint: No chief complaint on file.      Mary Blair is a 57 yo F with Hx of plasmacytoma of the lumbar spine, previously treated at [facility] with plasmacytoma .     Follow up 9/15/2020  - C/o chronic back pain much worse than before, neck/shoulder pain, elbow, back pain, ankles all over. Seeing a spine surgeon Dr. Vieyra and had MRI in 6/2020 which revealed mild disc prolapse in lumbar disc  - c/o feeling cold mostly evening, but never had temperature >100.4. No weight loss, vomiting, headaches, diarrhea or bleeding issues  - Currently on cymbalta for fibromyalgia and Tizanidine for back spasms  - Her myeloma labs SPEP, Immunoglobulins, free LT chains, MIRNA are pending  - 12/20/2019- No FDG PET/CT findings to suggest recurrent or metastatic disease    Hematology History   She initially presented with back pain in early February of 2018 and underwent bone scan on 2/27/2018 which revealed Increased uptake within L3 body corresponding to lytic lesion seen on 2/16/18 CT and underwent back surgery on 2/28/2018. A bone marrow biopsy done on 3/8/2018 was negative for any malignancy. However the biopsy of L3 vertebral body lesion revealed lambda restricted plasma cell neoplasm and skeletal survey done on 3/21/2018 revealed "Periprosthetic lucency associated with the left proximal femur potentially reflecting osteolysis rather than malignancy". She underwent ratiation threapy to L3, L4 and L5 of spine from March to May 8, 2018 for a total of 8 weeks due to suspicion for invasion of adjacent vertebrae.      4/11/2018 xray of L-spine revealed no suspicious osseous lesions and intact posterior spinal fusion L2-L4.  4/13/2018 - MIRNA + UPEP- Normal immunofixation pattern  4/26/2018 - CT abdo/pelvis- NO suspicious osseous lesions identified  4/26/2018 - CT Thoracic Spine W/WO - No suspicious osseous lesions " identified.    5/23/2018 - B2 microglobulin:  1.8 mg/L (ref 0.6-2.4). CBC, CMP unremarkable   - Immunoglobulins: Normal   - LDH: 178 ()    - SPEP- Total protein normal.  Apparent normal immunofixation pattern. Normal FLC ratio     8/03/18 - MRI C-spine WO - No focal lesions observed  8/17/18 - MRI L-Spine W/WO - No focal lesions observed  9/24/18 - MRI brain W/Wo- Normal study  10/24/2018 -  Normal immunoglobulins, LDH. Normal SPEP and MIRNA. No M spike seen. Normal FLC ratio     She c/o chronic back pain, denies any new complaints.      She has PMHx of L hip replacements in 1986 and 2010, B/L carpal tunnel s/p surgery, R rotator cuff tear and trigger finger. She is a non smoker and drinks alcohol socially     She has family history of skin cancer and lung in grand mother      Subjective:    Initial History: Ms. Blair is a 58 y.o. female who returns for follow up. She had  PET CT    Allergies:  Review of patient's allergies indicates:  No Known Allergies    Medications:  Current Outpatient Medications   Medication Sig Dispense Refill    CELEBREX 100 mg capsule 100 mg.      cyclobenzaprine (FLEXERIL) 5 MG tablet Take 1 tablet (5 mg total) by mouth 3 (three) times daily as needed for Muscle spasms. 60 tablet 3    DULoxetine (CYMBALTA) 60 MG capsule Take 60 mg by mouth once daily.       tiZANidine (ZANAFLEX) 4 MG tablet Take 4 mg by mouth 3 (three) times daily as needed.      amitriptyline (ELAVIL) 10 MG tablet Take 1 tablet (10 mg total) by mouth every evening. (Patient not taking: Reported on 8/3/2023) 30 tablet 11    solifenacin (VESICARE) 10 MG tablet Take 10 mg by mouth.       No current facility-administered medications for this visit.       Review of Systems   Constitutional:  Negative for appetite change, chills, diaphoresis and fever.   HENT:  Negative for nosebleeds and trouble swallowing.         Occasional headache   Respiratory:  Negative for cough and shortness of breath.    Cardiovascular:  Negative  for chest pain.   Gastrointestinal:  Negative for abdominal pain, blood in stool, diarrhea, nausea and vomiting.   Genitourinary:  Negative for hematuria.   Musculoskeletal:  Positive for back pain and neck pain.   Skin:  Negative for rash.   Neurological:  Negative for seizures, syncope and headaches.   Hematological:  Negative for adenopathy.   Psychiatric/Behavioral:  Negative for agitation and behavioral problems. The patient is nervous/anxious.            Vitals:    24 1056   BP: (!) 112/57   Pulse: 78   Resp: 18   Temp: 98.4 °F (36.9 °C)            Physical Exam  Constitutional:       Appearance: Normal appearance.   HENT:      Head: Normocephalic and atraumatic.      Nose: Nose normal.      Mouth/Throat:      Mouth: Mucous membranes are moist.   Eyes:      Extraocular Movements: Extraocular movements intact.      Pupils: Pupils are equal, round, and reactive to light.   Cardiovascular:      Rate and Rhythm: Normal rate and regular rhythm.   Pulmonary:      Effort: Pulmonary effort is normal.      Breath sounds: Normal breath sounds.   Abdominal:      General: Abdomen is flat. Bowel sounds are normal.      Palpations: Abdomen is soft.   Musculoskeletal:         General: No swelling. Normal range of motion.      Cervical back: Normal range of motion and neck supple.   Skin:     General: Skin is warm and dry.      Capillary Refill: Capillary refill takes less than 2 seconds.   Neurological:      General: No focal deficit present.      Mental Status: She is alert and oriented to person, place, and time.   Psychiatric:         Mood and Affect: Mood normal.           Imagin/20/2019 PET CT    1.  No definite evidence of active osseous disease.  In this patient with L3 plasmacytoma status post radiation therapy, there is mild nonspecific uptake about the L3 vertebroplasty cement favored to represent postoperative inflammation status post removal of lumbar fusion hardware.  Attention on follow-up  recommended.    2.  No evidence of extramedullary disease.  Mild soft tissue thickening overlying the posterior paraspinous musculature at the L2-L4 level status post removal of hardware.  Mild tracer uptake within this region compatible with soft tissue infectious/noninfectious inflammation.      9/23/22 PET CT       In this patient with history of L3 vertebral body plasmacytoma status post vertebral augmentation, there is no focal abnormal radiotracer uptake to suggest local recurrence.  No new hypermetabolic disease elsewhere.     No hypermetabolic or CT abnormality correlating with lesion of the right posterior ilium noted on prior MR. Of note, lesion likely too small to characterize with PET.  Recommend attention on expected follow-up exams.      12/1/23 CT lumbar spine    Prior vertebral augmentation of the L3 vertebral body, with extrusion of augmentation material along the ventral aspect of the thecal sac.  Mild heterogeneity about the L3 vertebral body and posterior elements, likely representing sequela of post treatment change.  No evidence for acute fracture or new osseous destructive lesion.  No abnormal epidural enhancement or surrounding abnormal soft tissue enhancement.     Multilevel lumbar spondylosis as detailed above, worst at L4-L5, which contributes to moderate spinal canal stenosis as well as moderate bilateral neural foraminal narrowing.     Grade 1 anterolisthesis of L4 on L5.     Additional findings as above.    12/1/23 MRI pelvis    COMPARISON:  Multiple CTs, most recent 12/01/2023; MRI spine 06/10/2022     FINDINGS:  BONE: Multiple subcentimeter STIR hyperintense lesions scattered throughout the pelvis and right proximal femur measuring up to 0.6 cm, for example in the right posterior ilium on 4:7.  No cortical destruction or extraosseous extension.  The posterior iliac lesion is unchanged from 06/10/2022 spine MRI.  Other lesions were outside the field of view.  No corresponding  abnormalities on previous CTs.     Partially imaged postoperative changes from vertebral augmentation at L3.  There is fatty metaplasia in the lower lumbar spine, compatible with post radiation changes.     No fracture or osteonecrosis.     JOINT: Postoperative changes from left hip arthroplasty.  Susceptibility artifact markedly degrades evaluation of adjacent structures, including perihardware bone.  No joint effusion or synovitis in the right hip.  Multilevel degenerative changes in the lower lumbar spine.     SOFT TISSUE: Partial tear of the right proximal hamstring tendon.  Narrowing of the left ischial femoral space with bony proliferative changes, adventitial bursa formation, and atrophy of quadratus femoris.     MISCELLANEOUS: No visceral pelvic soft tissue abnormality.     Impression:     Partially imaged posttreatment changes in the lumbar spine for an L3 plasmacytoma.     Multiple subcentimeter lesions scattered throughout the pelvis and right proximal femur, concerning for multiple myeloma.  Stability is difficult to determine as many were outside the field of view on previous MRI.  Correlate with lab values.     Left ischiofemoral impingement.     Partial tear of the right proximal hamstring tendon.       2/2/24 sIFE: No monoclonal peaks identified.       3/7/24 PET CT  COMPARISON:  PET-CT 09/23/2022, 09/23/2020, MRI pelvis 12/01/2023     FINDINGS:  Quality of the study: Adequate.     In the head and neck, there are no hypermetabolic mucosal lesions, and there are no pathologically enlarged or hypermetabolic lymph nodes.     In the chest, there are no hypermetabolic lesions worrisome for malignancy.  There are no concerning pulmonary nodules or masses, and there are no pathologically enlarged or hypermetabolic lymph nodes.     In the abdomen and pelvis, there is physiologic tracer distribution within the abdominal organs and excretion into the genitourinary system.     In the bones, there is a new small  hypermetabolic lytic lesion in the right occipital condyle (image 51) measuring 0.8 cm with SUV max 4.     New mild radiotracer uptake corresponding to a small lytic lesion in the right posterior ilium (image 214) with SUV max 2.7, with corresponding lesion identified on prior MRI.     New mild radiotracer uptake corresponding with a punctate lucent focus within the right iliac bone near the sacroiliac joint (image 227) with maximum SUV of 2.3, with corresponding lesion identified on prior MRI.     Stable appearance of the L3 vertebral body status post vertebral augmentation, with normal background level uptake.     In the extremities, there are no hypermetabolic lesions worrisome for malignancy.     Increased physiologic skeletal muscle uptake within the bilateral upper and lower extremities.     Additional CT findings: Surgical clips adjacent to the spleen.  Small fat containing umbilical hernia.  Left hip arthroplasty.     Impression:     New small hypermetabolic lytic lesion in the right occipital condyle.  New mild radiotracer uptake corresponding to small lytic foci in the right iliac bone, with corresponding lesions identified on prior MRI.  Findings are concerning for myeloma.     Stable appearance of the L3 vertebral body status post vertebral augmentation, with normal background level uptake.           Component      Latest Ref Rng 2/2/2024   WBC      3.90 - 12.70 K/uL 5.15    RBC      4.00 - 5.40 M/uL 4.07    Hemoglobin      12.0 - 16.0 g/dL 12.7    Hematocrit      37.0 - 48.5 % 39.5    MCV      82 - 98 fL 97    MCH      27.0 - 31.0 pg 31.2 (H)    MCHC      32.0 - 36.0 g/dL 32.2    RDW      11.5 - 14.5 % 13.2    Platelet Count      150 - 450 K/uL 259    MPV      9.2 - 12.9 fL 10.8    Immature Granulocytes      0.0 - 0.5 % 0.4    Gran # (ANC)      1.8 - 7.7 K/uL 2.9    Immature Grans (Abs)      0.00 - 0.04 K/uL 0.02    Lymph #      1.0 - 4.8 K/uL 1.6    Mono #      0.3 - 1.0 K/uL 0.5    Eos #      0.0 -  0.5 K/uL 0.1    Baso #      0.00 - 0.20 K/uL 0.05    nRBC      0 /100 WBC 0    Gran %      38.0 - 73.0 % 56.8    Lymph %      18.0 - 48.0 % 31.7    Mono %      4.0 - 15.0 % 8.7    Eos %      0.0 - 8.0 % 1.4    Basophil %      0.0 - 1.9 % 1.0    Differential Method Automated    Sodium      136 - 145 mmol/L 142    Potassium      3.5 - 5.1 mmol/L 4.2    Chloride      95 - 110 mmol/L 107    CO2      23 - 29 mmol/L 29    Glucose      70 - 110 mg/dL 86    BUN      6 - 20 mg/dL 10    Creatinine      0.5 - 1.4 mg/dL 0.9    Calcium      8.7 - 10.5 mg/dL 9.3    PROTEIN TOTAL      6.0 - 8.4 g/dL 6.6    Albumin      3.5 - 5.2 g/dL 3.8    BILIRUBIN TOTAL      0.1 - 1.0 mg/dL 0.3    ALP      55 - 135 U/L 64    AST      10 - 40 U/L 18    ALT      10 - 44 U/L 16    eGFR      >60 mL/min/1.73 m^2 >60.0    Anion Gap      8 - 16 mmol/L 6 (L)    [place] Free Light Chains      0.33 - 1.94 mg/dL 1.38    Lambda Free Light Chains      0.57 - 2.63 mg/dL 2.96 (H)    Kappa/Lambda FLC Ratio      0.26 - 1.65  0.47    IgG      650 - 1600 mg/dL 765    IgA      40 - 350 mg/dL 71    IgM      50 - 300 mg/dL 102    Lactate Dehydrogenase      110 - 260 U/L 179    Uric Acid      2.4 - 5.7 mg/dL 3.7         2/2/24 sIFE: No monoclonal peaks identified.       Assessment:             1. 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  PET/CT done on 12/20/2019 did not suggest any recurrent or metastatic disease. She had no bone or soft tissue lesions suggestive of plasma cytoma in Sept 2022. MRI pelvis done in Dec 2022 demonstrated multiple subcentimeter lesions scattered throughout the pelvis and right proximal femur.   PET CT done on 3/7/24 showed a new small hypermetabolic lytic lesion in the right occipital condyle. There was a new mild radiotracer uptake corresponding to small lytic foci in the right iliac bone.  No monoclonal protein on serum MIRNA on 2/2/24. CBC, CMP normal. sFLC ratio normal. A BM biopsy  will be scheduled.          2. Generalized pain:  c/o chronic pain in the back, shoulders, hands and legs. She was diagnosed with fibromyalgia and currently on Cymbalta with moderate improvement. She tried gabapentin and Lyrica in the past with little benefit.     3. Bilateral Carpal tunnel syndrome : s/p surgery              BMT Chart Routing      Follow up with physician . Bone amrrow biopsuy under sedation-next available; follow up after   Follow up with AMANDA    Provider visit type    Infusion scheduling note    Injection scheduling note    Labs    Imaging    Pharmacy appointment    Other referrals

## 2024-04-10 ENCOUNTER — HOSPITAL ENCOUNTER (OUTPATIENT)
Facility: HOSPITAL | Age: 59
Discharge: HOME | End: 2024-04-10
Attending: INTERNAL MEDICINE | Admitting: INTERNAL MEDICINE
Payer: COMMERCIAL

## 2024-04-10 ENCOUNTER — ANESTHESIA EVENT (OUTPATIENT)
Dept: ENDOSCOPY | Facility: HOSPITAL | Age: 59
End: 2024-04-10
Payer: COMMERCIAL

## 2024-04-10 ENCOUNTER — ANESTHESIA (OUTPATIENT)
Dept: ENDOSCOPY | Facility: HOSPITAL | Age: 59
End: 2024-04-10
Payer: COMMERCIAL

## 2024-04-10 VITALS
BODY MASS INDEX: 22.08 KG/M2 | WEIGHT: 120 LBS | RESPIRATION RATE: 16 BRPM | TEMPERATURE: 98 F | DIASTOLIC BLOOD PRESSURE: 58 MMHG | OXYGEN SATURATION: 96 % | HEART RATE: 60 BPM | SYSTOLIC BLOOD PRESSURE: 127 MMHG | HEIGHT: 62 IN

## 2024-04-10 DIAGNOSIS — D49.89 PLASMA CELL NEOPLASM: ICD-10-CM

## 2024-04-10 PROCEDURE — 25000003 PHARM REV CODE 250: Performed by: INTERNAL MEDICINE

## 2024-04-10 PROCEDURE — 88185 FLOWCYTOMETRY/TC ADD-ON: CPT | Mod: 91

## 2024-04-10 PROCEDURE — 88364 INSITU HYBRIDIZATION (FISH): CPT | Mod: 26,,, | Performed by: PATHOLOGY

## 2024-04-10 PROCEDURE — 88184 FLOWCYTOMETRY/ TC 1 MARKER: CPT | Mod: 91

## 2024-04-10 PROCEDURE — 38222 DX BONE MARROW BX & ASPIR: CPT | Performed by: INTERNAL MEDICINE

## 2024-04-10 PROCEDURE — 88365 INSITU HYBRIDIZATION (FISH): CPT | Mod: 26,,, | Performed by: PATHOLOGY

## 2024-04-10 PROCEDURE — 88313 SPECIAL STAINS GROUP 2: CPT | Mod: 26,,, | Performed by: PATHOLOGY

## 2024-04-10 PROCEDURE — 88364 INSITU HYBRIDIZATION (FISH): CPT | Performed by: PATHOLOGY

## 2024-04-10 PROCEDURE — 88305 TISSUE EXAM BY PATHOLOGIST: CPT | Mod: 26,,, | Performed by: PATHOLOGY

## 2024-04-10 PROCEDURE — 88342 IMHCHEM/IMCYTCHM 1ST ANTB: CPT | Mod: 26,,, | Performed by: PATHOLOGY

## 2024-04-10 PROCEDURE — 88271 CYTOGENETICS DNA PROBE: CPT | Mod: 59

## 2024-04-10 PROCEDURE — 88313 SPECIAL STAINS GROUP 2: CPT | Performed by: PATHOLOGY

## 2024-04-10 PROCEDURE — 88341 IMHCHEM/IMCYTCHM EA ADD ANTB: CPT | Mod: 59 | Performed by: PATHOLOGY

## 2024-04-10 PROCEDURE — 88185 FLOWCYTOMETRY/TC ADD-ON: CPT | Mod: 59 | Performed by: PATHOLOGY

## 2024-04-10 PROCEDURE — 37000008 HC ANESTHESIA 1ST 15 MINUTES: Performed by: INTERNAL MEDICINE

## 2024-04-10 PROCEDURE — 88311 DECALCIFY TISSUE: CPT | Mod: 26,,, | Performed by: PATHOLOGY

## 2024-04-10 PROCEDURE — 88274 CYTOGENETICS 25-99: CPT | Mod: 59

## 2024-04-10 PROCEDURE — 88341 IMHCHEM/IMCYTCHM EA ADD ANTB: CPT | Mod: 26,,, | Performed by: PATHOLOGY

## 2024-04-10 PROCEDURE — 88184 FLOWCYTOMETRY/ TC 1 MARKER: CPT | Performed by: PATHOLOGY

## 2024-04-10 PROCEDURE — 88311 DECALCIFY TISSUE: CPT | Performed by: PATHOLOGY

## 2024-04-10 PROCEDURE — 88305 TISSUE EXAM BY PATHOLOGIST: CPT | Mod: 59 | Performed by: PATHOLOGY

## 2024-04-10 PROCEDURE — 38222 DX BONE MARROW BX & ASPIR: CPT | Mod: RT,,,

## 2024-04-10 PROCEDURE — 88342 IMHCHEM/IMCYTCHM 1ST ANTB: CPT | Mod: 59 | Performed by: PATHOLOGY

## 2024-04-10 PROCEDURE — E9220 PRA ENDO ANESTHESIA: HCPCS | Mod: ,,, | Performed by: REGISTERED NURSE

## 2024-04-10 PROCEDURE — 37000009 HC ANESTHESIA EA ADD 15 MINS: Performed by: INTERNAL MEDICINE

## 2024-04-10 PROCEDURE — 25000003 PHARM REV CODE 250: Performed by: REGISTERED NURSE

## 2024-04-10 PROCEDURE — 63600175 PHARM REV CODE 636 W HCPCS: Performed by: REGISTERED NURSE

## 2024-04-10 PROCEDURE — 85097 BONE MARROW INTERPRETATION: CPT | Mod: ,,, | Performed by: PATHOLOGY

## 2024-04-10 PROCEDURE — 88365 INSITU HYBRIDIZATION (FISH): CPT | Mod: 59 | Performed by: PATHOLOGY

## 2024-04-10 PROCEDURE — 88189 FLOWCYTOMETRY/READ 16 & >: CPT | Mod: ,,, | Performed by: PATHOLOGY

## 2024-04-10 RX ORDER — LIDOCAINE HYDROCHLORIDE 20 MG/ML
INJECTION INTRAVENOUS
Status: DISCONTINUED | OUTPATIENT
Start: 2024-04-10 | End: 2024-04-10

## 2024-04-10 RX ORDER — PROPOFOL 10 MG/ML
INJECTION, EMULSION INTRAVENOUS CONTINUOUS PRN
Status: DISCONTINUED | OUTPATIENT
Start: 2024-04-10 | End: 2024-04-10

## 2024-04-10 RX ORDER — PROPOFOL 10 MG/ML
VIAL (ML) INTRAVENOUS
Status: DISCONTINUED | OUTPATIENT
Start: 2024-04-10 | End: 2024-04-10

## 2024-04-10 RX ORDER — SODIUM CHLORIDE 9 MG/ML
INJECTION, SOLUTION INTRAVENOUS CONTINUOUS
Status: DISCONTINUED | OUTPATIENT
Start: 2024-04-10 | End: 2024-04-10 | Stop reason: HOSPADM

## 2024-04-10 RX ADMIN — PROPOFOL 70 MG: 10 INJECTION, EMULSION INTRAVENOUS at 10:04

## 2024-04-10 RX ADMIN — LIDOCAINE HYDROCHLORIDE 40 MG: 20 INJECTION INTRAVENOUS at 10:04

## 2024-04-10 RX ADMIN — PROPOFOL 180 MCG/KG/MIN: 10 INJECTION, EMULSION INTRAVENOUS at 10:04

## 2024-04-10 RX ADMIN — SODIUM CHLORIDE: 0.9 INJECTION, SOLUTION INTRAVENOUS at 09:04

## 2024-04-10 NOTE — INTERVAL H&P NOTE
The patient has been examined and the H&P has been reviewed:    I concur with the findings and no changes have occurred since H&P was written.    Procedure risks, benefits and alternative options discussed and understood by patient/family.          There are no hospital problems to display for this patient.     Electrodesiccation And Curettage Text: The wound bed was treated with electrodesiccation and curettage after the biopsy was performed.

## 2024-04-10 NOTE — TRANSFER OF CARE
"Anesthesia Transfer of Care Note    Patient: Mary Blair    Procedure(s) Performed: Procedure(s) (LRB):  Biopsy-bone marrow (Left)    Patient location: GI    Anesthesia Type: general    Transport from OR: Transported from OR on room air with adequate spontaneous ventilation    Post pain: adequate analgesia    Post assessment: no apparent anesthetic complications and tolerated procedure well    Post vital signs: stable    Level of consciousness: responds to stimulation    Nausea/Vomiting: no nausea/vomiting    Complications: none    Transfer of care protocol was followedComments: Nurse at bedside, VSS, spont reg resp noted    Last vitals: Visit Vitals  BP (!) 107/57   Pulse 63   Temp 36.5 °C (97.7 °F) (Temporal)   Resp 17   Ht 5' 2" (1.575 m)   Wt 54.4 kg (120 lb)   SpO2 96%   Breastfeeding No   BMI 21.95 kg/m²     "

## 2024-04-10 NOTE — DISCHARGE SUMMARY
[facility]  Hematology  Bone Marrow Transplant  Discharge Summary      Patient Name: Mary Blair  MRN: [id number]  Admission Date: 4/10/2024  Hospital Length of Stay: 0 days  Discharge Date and Time: 4/10/2024 11:09 AM  Attending Physician: Santa Brown MD   Discharging Provider: Melly Dumont NP  Primary Care Provider: Carolann, Primary Doctor    HPI: Patient presents for bone marrow biopsy to evaluate for plasma cell neoplasm with history of plasmacytoma of the lumbar spine.    Procedure(s) (LRB):  Biopsy-bone marrow (Right)     Hospital Course: Patient admitted to endoscopy suite today for a bone marrow aspiration and biopsy. Pt was consented for a bone marrow biopsy. Patient was sedated per anesthesia and a bone marrow biopsy and aspiration was performed in the endoscopy suite procedure room (see procedure note). Patient was then transferred to post op recovery area and discharged home when appropriate per anesthesia.      Goals of Care Treatment Preferences:  Code Status: Full Code          Significant Diagnostic Studies:   Specimen (24h ago, onward)       Start     Ordered    04/10/24 0951  Specimen to Pathology, Bone Marrow Aspiration/Biopsy  Once        Question Answer Comment   Specimen Source: Bone Marrow Aspirate, Right Iliac Crest    Clinical Information: plasma cell neoplasm    Release to patient Immediate        04/10/24 0950                    Pending Diagnostic Studies:       Procedure Component Value Units Date/Time    Chromosome Analysis, Bone Marrow Right Posterior Iliac Crest [[id number]] Collected: 04/10/24 0951    Order Status: Sent Lab Status: In process Updated: 04/10/24 0951    Specimen: Bone Marrow     Leukemia/Lymphoma Screen - Bone Marrow Right Posterior Iliac Crest [[id number]] Collected: 04/10/24 0951    Order Status: Sent Lab Status: In process Updated: 04/10/24 0951    Specimen: Bone Marrow     Plasma Cell Proliferative Disorder (PCPD), FISH [[id number]]  Collected: 04/10/24 0951    Order Status: Sent Lab Status: In process Updated: 04/10/24 0952    Specimen: Bone Marrow     Specimen to Pathology, Bone Marrow Aspiration/Biopsy [[id number]] Collected: 04/10/24 0951    Order Status: Sent Lab Status: In process Updated: 04/10/24 0951    Specimen: Bone Marrow           Final Active Diagnoses:    Diagnosis Date Noted POA    PRINCIPAL PROBLEM:  Plasma cell neoplasm [D49.89] 02/05/2019 Yes      Problems Resolved During this Admission:      Discharged Condition: stable    Disposition: Home or Self Care    Follow Up: No future appointments.     Patient Instructions:      Diet Adult Regular     Remove dressing in 24 hours     No dressing needed     Activity as tolerated     Shower on day dressing removed (No bath)     Medications:  Reconciled Home Medications:      Medication List        ASK your doctor about these medications      amitriptyline 10 MG tablet  Commonly known as: ELAVIL  Take 1 tablet (10 mg total) by mouth every evening.     CeleBREX 100 MG capsule  Generic drug: celecoxib  100 mg.     cyclobenzaprine 5 MG tablet  Commonly known as: FLEXERIL  Take 1 tablet (5 mg total) by mouth 3 (three) times daily as needed for Muscle spasms.     DULoxetine 60 MG capsule  Commonly known as: CYMBALTA  Take 60 mg by mouth once daily.     solifenacin 10 MG tablet  Commonly known as: VESICARE  Take 10 mg by mouth.     tiZANidine 4 MG tablet  Commonly known as: ZANAFLEX  Take 4 mg by mouth 3 (three) times daily as needed.              Melly Dumont NP  Bone Marrow Transplant  [facility]

## 2024-04-10 NOTE — PROCEDURES
PROCEDURE NOTE:  Date of Procedure: 04/10/2024   Bone Marrow Biopsy and Aspiration. Procedure completed by Marilyn Rodriguez NP under the supervision of Melly Dumont NP.   Indication: Evaluation for plasma cell neoplasm   Consent: Informed consent was obtained from patient.  Timeout: Done and documented.  Position: Left lateral   Site: Right posterior illiac crest.  Prep: Betadine.  Needle used: 11 gauge Jamshidi needle.  Anesthetic: 1% lidocaine 5 cc.  Biopsy: The biopsy needle was introduced into the marrow cavity and 18 cc of aspirate was obtained without complications and sent for flow cytometry, PCPD FISH, and cytogenetics. Core biopsy obtained without difficulty and sent for routine histologic examination.  Complications: None.  Disposition: The patient was discharged home per anesthesia protocol.  Blood loss: Minimal.     ROSIE Mckenna  Hematology/Oncology/Bone Marrow Transplant

## 2024-04-10 NOTE — ANESTHESIA PREPROCEDURE EVALUATION
04/10/2024  Mary Blair is a 58 y.o., female. 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 female who  has a past medical history of Cancer, Plasmacytoma, Rotator cuff disorder, and Spinal stenosis. No notes on file    No current facility-administered medications for this encounter.       she has a current medication list which includes the following long-term medication(s): amitriptyline, duloxetine, and solifenacin.   ALLERGIES:   Review of patient's allergies indicates:  No Known Allergies  LDA:          Lines/Drains/Airways       None                 MEDICATIONS:     Antibiotics (From admission, onward)      None          VTE Risk Mitigation (From admission, onward)      None          No current facility-administered medications for this encounter.     Current Outpatient Medications   Medication Sig Dispense Refill    amitriptyline (ELAVIL) 10 MG tablet Take 1 tablet (10 mg total) by mouth every evening. (Patient not taking: Reported on 8/3/2023) 30 tablet 11    CELEBREX 100 mg capsule 100 mg.      cyclobenzaprine (FLEXERIL) 5 MG tablet Take 1 tablet (5 mg total) by mouth 3 (three) times daily as needed for Muscle spasms. 60 tablet 3    DULoxetine (CYMBALTA) 60 MG capsule Take 60 mg by mouth once daily.       solifenacin (VESICARE) 10 MG tablet Take 10 mg by mouth.       tiZANidine (ZANAFLEX) 4 MG tablet Take 4 mg by mouth 3 (three) times daily as needed.            History:   There are no hospital problems to display for this patient.    Surgical History:    has a past surgical history that includes Rotator cuff repair; Total hip arthroplasty; corpal tunnel;  section; Tonsillectomy; lubar sugery; and Joint replacement.   Social History:    reports being sexually active and has had partner(s) who are male.  reports that she has never smoked. She has never used smokeless tobacco. She reports current alcohol use. She reports that she does not use drugs.     OBJECTIVE:     Vital Signs (Most Recent):    Vital Signs Range (Last 24H):          There is no height or weight on file to calculate BMI.   Wt Readings from Last 4 Encounters:   24 52.6 kg (116 lb)   24 55 kg (121 lb 5.8 oz)   23 68.1 kg (150 lb 0.4 oz)   10/16/22 86.2 kg (190 lb)     Significant Labs:  Lab Results   Component Value Date    WBC 5.15 2024    HGB 12.7 2024    HCT 39.5 2024     2024     2024    K 4.2 2024     2024    CREATININE 0.9 2024    BUN 10 2024    CO2 29 2024    GLU 86 2024    CALCIUM 9.3 2024    MG 2.1 2019    PHOS 4.4 2019    ALKPHOS 64 2024    ALT 16 2024    AST 18 2024    ALBUMIN 3.8 2024    CPK 56 2022     No LMP recorded. Patient is postmenopausal.  No results found for this or any previous visit (from the past 72 hour(s)).    EKG:   Results for orders placed or performed during the hospital encounter of 19   EKG 12-lead    Collection Time: 19  1:47 PM    Narrative    Test Reason : A41.50,    Vent. Rate : 069 BPM     Atrial Rate : 069 BPM     P-R Int : 168 ms          QRS Dur : 084 ms      QT Int : 388 ms       P-R-T Axes : 047 -14 001 degrees     QTc Int : 415 ms    Normal sinus rhythm  Moderate voltage criteria for LVH, may be normal  "variant  Borderline Abnormal ECG  No previous ECGs available  Confirmed by Shar Tinajero MD ([id number]) on 9/3/2019 12:51:11 PM    Referred By: [email or web address]   SELF           Confirmed By:Shar Tinajero MD       TTE:  No results found for this or any previous visit.  No results found for: "EF"   No results found for this or any previous visit.  DIEGO:  No results found for this or any previous visit.  Stress Test:  No results found for this or any previous visit.     LHC:  No results found for this or any previous visit.     PFT:  No results found for: "FEV1", "FVC", "LYL8KHU", "TLC", "DLCO"   ASSESSMENT/PLAN:          Pre-op Assessment    I have reviewed the Patient Summary Reports.    I have reviewed the NPO Status.   I have reviewed the Medications.     Review of Systems  Anesthesia Hx:   History of prior surgery of interest to airway management or planning:             Hematology/Oncology:  Hematology Normal   Oncology Normal                                   EENT/Dental:  EENT/Dental Normal           Cardiovascular:  Cardiovascular Normal                                            Pulmonary:  Pulmonary Normal                       Renal/:  Chronic Renal Disease                Hepatic/GI:  Hepatic/GI Normal                 Musculoskeletal:  Musculoskeletal Normal                Neurological:      Headaches                                 Endocrine:  Endocrine Normal            Dermatological:  Skin Normal    Psych:  Psychiatric Normal                    Physical Exam  General: Well nourished    Airway:  Mallampati: II   Mouth Opening: Normal  TM Distance: Normal  Tongue: Normal  Neck ROM: Normal ROM    Dental:  Intact        Anesthesia Plan  Type of Anesthesia, risks & benefits discussed:    Anesthesia Type: Gen ETT, Gen Natural Airway  Intra-op Monitoring Plan: Standard ASA Monitors  Post Op Pain Control Plan: multimodal analgesia  Induction:  IV  ASA Score: 3  Day of Surgery Review of History & Physical: H&P Update " referred to the surgeon/provider.I have interviewed and examined the patient. I have reviewed the patient's H&P dated:     Ready For Surgery From Anesthesia Perspective.     .

## 2024-04-10 NOTE — ANESTHESIA POSTPROCEDURE EVALUATION
Anesthesia Post Evaluation    Patient: Mary Blair    Procedure(s) Performed: Procedure(s) (LRB):  Biopsy-bone marrow (Left)    Final Anesthesia Type: general      Patient location during evaluation: PACU  Patient participation: Yes- Able to Participate  Level of consciousness: awake and alert  Post-procedure vital signs: reviewed and stable  Pain management: adequate  Airway patency: patent    PONV status at discharge: No PONV  Anesthetic complications: no      Cardiovascular status: blood pressure returned to baseline  Respiratory status: room air  Hydration status: euvolemic  Follow-up not needed.              Vitals Value Taken Time   /58 04/10/24 1058   Temp 36.7 °C (98.1 °F) 04/10/24 1028   Pulse 60 04/10/24 1058   Resp 16 04/10/24 1058   SpO2 96 % 04/10/24 1058         Event Time   Out of Recovery 11:09:12         Pain/Zainab Score: Zainab Score: 10 (4/10/2024 10:58 AM)

## 2024-04-10 NOTE — DISCHARGE INSTRUCTIONS
Discharge instructions for having a Bone Marrow Aspiration / Biopsy:    Keep Bandage in place for 24 hours.  - Do not shower or take a tub bath during this time (you may sponge bathe).  - Call the nurse or physician for excessive bleeding or pain at biopsy site.  - You may take Tylenol as needed for pain unless otherwise specified by your doctor.     During procedure today you have received medication to sedate you. These medications may affect your judgment, balance, and/or coordination. Therefore, for the next 24 hours, you have the follow restrictions:  - Do not drive a car or operate heavy machinery for the rest of the day.  - Do not make legal/financial decisions, sign important papers, or drink alcohol.  - You may resume other activities as tolerated.    You can call [phone] for any problems during the hours of 8:00 AM-5:00PM.    For an emergency after 5:00 PM you can call [phone] and have the  page the Hematologist / Oncologist on call.

## 2024-04-11 LAB
BODY SITE - BONE MARROW: NORMAL
CLINICAL DIAGNOSIS - BONE MARROW: NORMAL
FLOW CYTOMETRY ANTIBODIES ANALYZED - BONE MARROW: NORMAL
FLOW CYTOMETRY COMMENT - BONE MARROW: NORMAL
FLOW CYTOMETRY INTERPRETATION - BONE MARROW: NORMAL

## 2024-04-12 LAB
PCPDS FINAL DIAGNOSIS: NORMAL
PCPDS PRE-ANALYSIS PRE-SORT: NORMAL

## 2024-04-16 LAB
COMMENT: NORMAL
FINAL PATHOLOGIC DIAGNOSIS: NORMAL
GENETICIST REVIEW: NORMAL
GROSS: NORMAL
Lab: NORMAL
MICROSCOPIC EXAM: NORMAL
PLASMA CELL PROLIF RELEASED BY: NORMAL
PLASMA CELL PROLIF RESULT SUMMARY: NORMAL
PLASMA CELL PROLIF RESULT TABLE: NORMAL
REASON FOR REFERRAL, PLASMA CELL PROLIF (PCPD), FISH: NORMAL
REF LAB TEST METHOD: NORMAL
RESULTS, PLASMA CELL PROLIF (PCPD), FISH: NORMAL
SERVICE CMNT-IMP: NORMAL
SERVICE CMNT-IMP: NORMAL
SPECIMEN SOURCE: NORMAL
SPECIMEN, PLASMA CELL PROLIF (PCPD), FISH: NORMAL
SUPPLEMENTAL DIAGNOSIS: NORMAL

## 2024-04-18 NOTE — PROGRESS NOTES
"  PATIENT: Mary Blair  MRN: [id number]  DATE: 4/18/2024      Diagnosis:   No diagnosis found.          Chief Complaint: No chief complaint on file.      Mary Blair is a 57 yo F with Hx of plasmacytoma of the lumbar spine, previously treated at [facility] with plasmacytoma .     Follow up 9/15/2020  - C/o chronic back pain much worse than before, neck/shoulder pain, elbow, back pain, ankles all over. Seeing a spine surgeon Dr. Vieyra and had MRI in 6/2020 which revealed mild disc prolapse in lumbar disc  - c/o feeling cold mostly evening, but never had temperature >100.4. No weight loss, vomiting, headaches, diarrhea or bleeding issues  - Currently on cymbalta for fibromyalgia and Tizanidine for back spasms  - Her myeloma labs SPEP, Immunoglobulins, free LT chains, MIRNA are pending  - 12/20/2019- No FDG PET/CT findings to suggest recurrent or metastatic disease    Hematology History   She initially presented with back pain in early February of 2018 and underwent bone scan on 2/27/2018 which revealed Increased uptake within L3 body corresponding to lytic lesion seen on 2/16/18 CT and underwent back surgery on 2/28/2018. A bone marrow biopsy done on 3/8/2018 was negative for any malignancy. However the biopsy of L3 vertebral body lesion revealed lambda restricted plasma cell neoplasm and skeletal survey done on 3/21/2018 revealed "Periprosthetic lucency associated with the left proximal femur potentially reflecting osteolysis rather than malignancy". She underwent ratiation threapy to L3, L4 and L5 of spine from March to May 8, 2018 for a total of 8 weeks due to suspicion for invasion of adjacent vertebrae.      4/11/2018 xray of L-spine revealed no suspicious osseous lesions and intact posterior spinal fusion L2-L4.  4/13/2018 - MIRNA + UPEP- Normal immunofixation pattern  4/26/2018 - CT abdo/pelvis- NO suspicious osseous lesions identified  4/26/2018 - CT Thoracic Spine W/WO - No suspicious osseous lesions " identified.    5/23/2018 - B2 microglobulin:  1.8 mg/L (ref 0.6-2.4). CBC, CMP unremarkable   - Immunoglobulins: Normal   - LDH: 178 ()    - SPEP- Total protein normal.  Apparent normal immunofixation pattern. Normal FLC ratio     8/03/18 - MRI C-spine WO - No focal lesions observed  8/17/18 - MRI L-Spine W/WO - No focal lesions observed  9/24/18 - MRI brain W/Wo- Normal study  10/24/2018 -  Normal immunoglobulins, LDH. Normal SPEP and MIRNA. No M spike seen. Normal FLC ratio     She c/o chronic back pain, denies any new complaints.      She has PMHx of L hip replacements in 1986 and 2010, B/L carpal tunnel s/p surgery, R rotator cuff tear and trigger finger. She is a non smoker and drinks alcohol socially     She has family history of skin cancer and lung in grand mother      Subjective:    Initial History: Ms. Blair is a 58 y.o. female who returns for follow up. She had  PET CT    Allergies:  Review of patient's allergies indicates:  No Known Allergies    Medications:  Current Outpatient Medications   Medication Sig Dispense Refill    amitriptyline (ELAVIL) 10 MG tablet Take 1 tablet (10 mg total) by mouth every evening. (Patient not taking: Reported on 8/3/2023) 30 tablet 11    CELEBREX 100 mg capsule 100 mg.      cyclobenzaprine (FLEXERIL) 5 MG tablet Take 1 tablet (5 mg total) by mouth 3 (three) times daily as needed for Muscle spasms. 60 tablet 3    DULoxetine (CYMBALTA) 60 MG capsule Take 60 mg by mouth once daily.       solifenacin (VESICARE) 10 MG tablet Take 10 mg by mouth.      tiZANidine (ZANAFLEX) 4 MG tablet Take 4 mg by mouth 3 (three) times daily as needed.       No current facility-administered medications for this visit.       Review of Systems   Constitutional:  Negative for appetite change, chills, diaphoresis and fever.   HENT:  Negative for nosebleeds and trouble swallowing.         Occasional headache   Respiratory:  Negative for cough and shortness of breath.    Cardiovascular:  Negative  for chest pain.   Gastrointestinal:  Negative for abdominal pain, blood in stool, diarrhea, nausea and vomiting.   Genitourinary:  Negative for hematuria.   Musculoskeletal:  Positive for back pain and neck pain.   Skin:  Negative for rash.   Neurological:  Negative for seizures, syncope and headaches.   Hematological:  Negative for adenopathy.   Psychiatric/Behavioral:  Negative for agitation and behavioral problems. The patient is nervous/anxious.      Vitals:    24 0923   BP: 110/60   Pulse: 70   Resp: 18   Temp: 99 °F (37.2 °C)         Physical Exam  Constitutional:       Appearance: Normal appearance.   HENT:      Head: Normocephalic and atraumatic.      Nose: Nose normal.      Mouth/Throat:      Mouth: Mucous membranes are moist.   Eyes:      Extraocular Movements: Extraocular movements intact.      Pupils: Pupils are equal, round, and reactive to light.   Cardiovascular:      Rate and Rhythm: Normal rate and regular rhythm.   Pulmonary:      Effort: Pulmonary effort is normal.      Breath sounds: Normal breath sounds.   Abdominal:      General: Abdomen is flat. Bowel sounds are normal.      Palpations: Abdomen is soft.   Musculoskeletal:         General: No swelling. Normal range of motion.      Cervical back: Normal range of motion and neck supple.   Skin:     General: Skin is warm and dry.      Capillary Refill: Capillary refill takes less than 2 seconds.   Neurological:      General: No focal deficit present.      Mental Status: She is alert and oriented to person, place, and time.   Psychiatric:         Mood and Affect: Mood normal.           Imagin/20/2019 PET CT    1.  No definite evidence of active osseous disease.  In this patient with L3 plasmacytoma status post radiation therapy, there is mild nonspecific uptake about the L3 vertebroplasty cement favored to represent postoperative inflammation status post removal of lumbar fusion hardware.  Attention on follow-up recommended.    2.   No evidence of extramedullary disease.  Mild soft tissue thickening overlying the posterior paraspinous musculature at the L2-L4 level status post removal of hardware.  Mild tracer uptake within this region compatible with soft tissue infectious/noninfectious inflammation.      9/23/22 PET CT       In this patient with history of L3 vertebral body plasmacytoma status post vertebral augmentation, there is no focal abnormal radiotracer uptake to suggest local recurrence.  No new hypermetabolic disease elsewhere.     No hypermetabolic or CT abnormality correlating with lesion of the right posterior ilium noted on prior MR. Of note, lesion likely too small to characterize with PET.  Recommend attention on expected follow-up exams.      12/1/23 CT lumbar spine    Prior vertebral augmentation of the L3 vertebral body, with extrusion of augmentation material along the ventral aspect of the thecal sac.  Mild heterogeneity about the L3 vertebral body and posterior elements, likely representing sequela of post treatment change.  No evidence for acute fracture or new osseous destructive lesion.  No abnormal epidural enhancement or surrounding abnormal soft tissue enhancement.     Multilevel lumbar spondylosis as detailed above, worst at L4-L5, which contributes to moderate spinal canal stenosis as well as moderate bilateral neural foraminal narrowing.     Grade 1 anterolisthesis of L4 on L5.     Additional findings as above.    12/1/23 MRI pelvis    COMPARISON:  Multiple CTs, most recent 12/01/2023; MRI spine 06/10/2022     FINDINGS:  BONE: Multiple subcentimeter STIR hyperintense lesions scattered throughout the pelvis and right proximal femur measuring up to 0.6 cm, for example in the right posterior ilium on 4:7.  No cortical destruction or extraosseous extension.  The posterior iliac lesion is unchanged from 06/10/2022 spine MRI.  Other lesions were outside the field of view.  No corresponding abnormalities on previous  CTs.     Partially imaged postoperative changes from vertebral augmentation at L3.  There is fatty metaplasia in the lower lumbar spine, compatible with post radiation changes.     No fracture or osteonecrosis.     JOINT: Postoperative changes from left hip arthroplasty.  Susceptibility artifact markedly degrades evaluation of adjacent structures, including perihardware bone.  No joint effusion or synovitis in the right hip.  Multilevel degenerative changes in the lower lumbar spine.     SOFT TISSUE: Partial tear of the right proximal hamstring tendon.  Narrowing of the left ischial femoral space with bony proliferative changes, adventitial bursa formation, and atrophy of quadratus femoris.     MISCELLANEOUS: No visceral pelvic soft tissue abnormality.     Impression:     Partially imaged posttreatment changes in the lumbar spine for an L3 plasmacytoma.     Multiple subcentimeter lesions scattered throughout the pelvis and right proximal femur, concerning for multiple myeloma.  Stability is difficult to determine as many were outside the field of view on previous MRI.  Correlate with lab values.     Left ischiofemoral impingement.     Partial tear of the right proximal hamstring tendon.       2/2/24 sIFE: No monoclonal peaks identified.       3/7/24 PET CT  COMPARISON:  PET-CT 09/23/2022, 09/23/2020, MRI pelvis 12/01/2023     FINDINGS:  Quality of the study: Adequate.     In the head and neck, there are no hypermetabolic mucosal lesions, and there are no pathologically enlarged or hypermetabolic lymph nodes.     In the chest, there are no hypermetabolic lesions worrisome for malignancy.  There are no concerning pulmonary nodules or masses, and there are no pathologically enlarged or hypermetabolic lymph nodes.     In the abdomen and pelvis, there is physiologic tracer distribution within the abdominal organs and excretion into the genitourinary system.     In the bones, there is a new small hypermetabolic lytic  lesion in the right occipital condyle (image 51) measuring 0.8 cm with SUV max 4.     New mild radiotracer uptake corresponding to a small lytic lesion in the right posterior ilium (image 214) with SUV max 2.7, with corresponding lesion identified on prior MRI.     New mild radiotracer uptake corresponding with a punctate lucent focus within the right iliac bone near the sacroiliac joint (image 227) with maximum SUV of 2.3, with corresponding lesion identified on prior MRI.     Stable appearance of the L3 vertebral body status post vertebral augmentation, with normal background level uptake.     In the extremities, there are no hypermetabolic lesions worrisome for malignancy.     Increased physiologic skeletal muscle uptake within the bilateral upper and lower extremities.     Additional CT findings: Surgical clips adjacent to the spleen.  Small fat containing umbilical hernia.  Left hip arthroplasty.     Impression:     New small hypermetabolic lytic lesion in the right occipital condyle.  New mild radiotracer uptake corresponding to small lytic foci in the right iliac bone, with corresponding lesions identified on prior MRI.  Findings are concerning for myeloma.     Stable appearance of the L3 vertebral body status post vertebral augmentation, with normal background level uptake.             2/2/24 sIFE: No monoclonal peaks identified.       4/10/24 RIGHT ILIAC CREST BONE MARROW ASPIRATE, BONE MARROW CLOT, AND BONE MARROW CORE BIOPSY WITH:     CELLULARITY=30%, TRILINEAGE HEMATOPOIETIC ACTIVITY (M/E=2.2:1).   NO DEFINITIVE MORPHOLOGIC OR IMMUNOPHENOTYPIC EVIDENCE OF LYMPHOMA OR PLASMA CELL NEOPLASM.  SEE COMMENT.   GRADE 1-2 RETICULAR FIBROSIS.   INCREASED STORAGE IRON.   ADEQUATE NUMBER OF MEGAKARYOCYTES.       Interp, Plasma Cell Prolif (PCPD), FISH:  The result is within normal limits for 1q duplication, TP53 deletion and IGH rearrangement.       Immunohistochemical studies were performed on the clot and core  biopsy for greater sensitivity and further architecture evaluation with adequate positive and negative controls.  Scattered mixed  T cells (CD3 positive, CD5 positive) and B cells (CD20   positive, BCL-6 negative, cyclin D1 negative) are evident.  About 6% plasma cells ( positive) are noted.  In situ hybridization for kappa and lambda light chain shows polytypic plasma cells.      There is no definitive morphologic or immunophenotypic evidence of lymphoma or plasma cell neoplasm.  Correlate clinically and with a cytogenetics report.         Assessment:             1. 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  PET/CT done on 12/20/2019 did not suggest any recurrent or metastatic disease. She had no bone or soft tissue lesions suggestive of plasma cytoma in Sept 2022. MRI pelvis done in Dec 2022 demonstrated multiple subcentimeter lesions scattered throughout the pelvis and right proximal femur.   PET CT done on 3/7/24 showed a new small hypermetabolic lytic lesion in the right occipital condyle. There was a new mild radiotracer uptake corresponding to small lytic foci in the right iliac bone.  No monoclonal protein on serum MIRNA on 2/2/24. CBC, CMP normal. sFLC ratio normal. Bone marrow biopsy on 4/10/24 did not demonstrate any definitive morphologic or immunophenotypic evidence of lymphoma or plasma cell neoplasm       2. Generalized pain:  c/o chronic pain in the back, shoulders, hands and legs. She was diagnosed with fibromyalgia and currently on Cymbalta with moderate improvement. She tried gabapentin and Lyrica in the past with little benefit.     3. Bilateral Carpal tunnel syndrome : s/p surgery             BMT Chart Routing      Follow up with physician 6 weeks.   Follow up with AMANDA    Provider visit type    Infusion scheduling note    Injection scheduling note    Labs CBC and CMP   Scheduling:  Preferred lab:  Lab interval:     Imaging Other   ct head in  1 week   Pharmacy appointment    Other referrals

## 2024-04-19 ENCOUNTER — TELEPHONE (OUTPATIENT)
Dept: HEMATOLOGY/ONCOLOGY | Facility: CLINIC | Age: 59
End: 2024-04-19

## 2024-04-19 ENCOUNTER — OFFICE VISIT (OUTPATIENT)
Dept: HEMATOLOGY/ONCOLOGY | Facility: CLINIC | Age: 59
End: 2024-04-19
Payer: COMMERCIAL

## 2024-04-19 VITALS
HEART RATE: 70 BPM | RESPIRATION RATE: 18 BRPM | OXYGEN SATURATION: 98 % | DIASTOLIC BLOOD PRESSURE: 60 MMHG | SYSTOLIC BLOOD PRESSURE: 110 MMHG | WEIGHT: 120 LBS | BODY MASS INDEX: 22.08 KG/M2 | TEMPERATURE: 99 F | HEIGHT: 62 IN

## 2024-04-19 DIAGNOSIS — C90.32 PLASMACYTOMA IN RELAPSE, UNSPECIFIED PLASMACYTOMA TYPE: Primary | ICD-10-CM

## 2024-04-19 DIAGNOSIS — R63.4 WEIGHT LOSS, ABNORMAL: ICD-10-CM

## 2024-04-19 DIAGNOSIS — G44.219 EPISODIC TENSION-TYPE HEADACHE, NOT INTRACTABLE: ICD-10-CM

## 2024-04-19 PROCEDURE — 99214 OFFICE O/P EST MOD 30 MIN: CPT | Mod: S$GLB,,, | Performed by: INTERNAL MEDICINE

## 2024-04-19 PROCEDURE — 3074F SYST BP LT 130 MM HG: CPT | Mod: CPTII,S$GLB,, | Performed by: INTERNAL MEDICINE

## 2024-04-19 PROCEDURE — 3078F DIAST BP <80 MM HG: CPT | Mod: CPTII,S$GLB,, | Performed by: INTERNAL MEDICINE

## 2024-04-19 PROCEDURE — 3008F BODY MASS INDEX DOCD: CPT | Mod: CPTII,S$GLB,, | Performed by: INTERNAL MEDICINE

## 2024-04-19 PROCEDURE — 99999 PR PBB SHADOW E&M-EST. PATIENT-LVL IV: CPT | Mod: PBBFAC,,, | Performed by: INTERNAL MEDICINE

## 2024-04-19 PROCEDURE — 1159F MED LIST DOCD IN RCRD: CPT | Mod: CPTII,S$GLB,, | Performed by: INTERNAL MEDICINE

## 2024-04-19 RX ORDER — ONDANSETRON 8 MG/1
8 TABLET, ORALLY DISINTEGRATING ORAL 2 TIMES DAILY PRN
COMMUNITY
Start: 2023-06-23

## 2024-04-22 ENCOUNTER — HOSPITAL ENCOUNTER (OUTPATIENT)
Dept: RADIOLOGY | Facility: HOSPITAL | Age: 59
Discharge: HOME | End: 2024-04-22
Attending: INTERNAL MEDICINE
Payer: COMMERCIAL

## 2024-04-22 DIAGNOSIS — C90.32 PLASMACYTOMA IN RELAPSE, UNSPECIFIED PLASMACYTOMA TYPE: ICD-10-CM

## 2024-04-22 PROCEDURE — 70450 CT HEAD/BRAIN W/O DYE: CPT | Mod: 26,,, | Performed by: RADIOLOGY

## 2024-04-22 PROCEDURE — 70450 CT HEAD/BRAIN W/O DYE: CPT | Mod: TC

## 2024-04-29 ENCOUNTER — PATIENT MESSAGE (OUTPATIENT)
Dept: HEMATOLOGY/ONCOLOGY | Facility: CLINIC | Age: 59
End: 2024-04-29
Payer: COMMERCIAL

## 2024-05-03 ENCOUNTER — PATIENT MESSAGE (OUTPATIENT)
Dept: HEMATOLOGY/ONCOLOGY | Facility: CLINIC | Age: 59
End: 2024-05-03
Payer: COMMERCIAL

## 2024-05-08 ENCOUNTER — PATIENT MESSAGE (OUTPATIENT)
Dept: HEMATOLOGY/ONCOLOGY | Facility: CLINIC | Age: 59
End: 2024-05-08
Payer: COMMERCIAL

## 2024-05-08 ENCOUNTER — TELEPHONE (OUTPATIENT)
Dept: HEMATOLOGY/ONCOLOGY | Facility: CLINIC | Age: 59
End: 2024-05-08
Payer: COMMERCIAL

## 2024-05-08 DIAGNOSIS — C90.30 SOLITARY PLASMACYTOMA, UNSPECIFIED WHETHER REMISSION ACHIEVED: Primary | ICD-10-CM

## 2024-05-08 NOTE — TELEPHONE ENCOUNTER
Spoke with Radiology department regarding the scheduling of Pt biopsy of new small hypermetabolic lytic lesion in the right occipital condyle. Interventional radiology advised that they are waiting for 's referral to be reviewed by the Radiology team first before the procedure can be scheduled. It will then be ready for scheduling based on the determination of approval per the radiology team. Pt advised of update via patient portal message.

## 2024-05-13 ENCOUNTER — TELEPHONE (OUTPATIENT)
Dept: INTERVENTIONAL RADIOLOGY/VASCULAR | Facility: CLINIC | Age: 59
End: 2024-05-13
Payer: COMMERCIAL

## 2024-05-13 NOTE — TELEPHONE ENCOUNTER
Left message for pt to return my call. Need to schedule IR consult.  Please forward call to [phone]. Thanks

## 2024-05-16 ENCOUNTER — OFFICE VISIT (OUTPATIENT)
Dept: INTERVENTIONAL RADIOLOGY/VASCULAR | Facility: CLINIC | Age: 59
End: 2024-05-16
Payer: COMMERCIAL

## 2024-05-16 DIAGNOSIS — M89.9 BONE LESION: Primary | ICD-10-CM

## 2024-05-16 DIAGNOSIS — C90.30 PLASMACYTOMA, UNSPECIFIED PLASMACYTOMA TYPE, UNSPECIFIED WHETHER REMISSION ACHIEVED: ICD-10-CM

## 2024-05-16 PROCEDURE — 1159F MED LIST DOCD IN RCRD: CPT | Mod: CPTII,95,, | Performed by: FAMILY MEDICINE

## 2024-05-16 PROCEDURE — 99204 OFFICE O/P NEW MOD 45 MIN: CPT | Mod: 95,,, | Performed by: FAMILY MEDICINE

## 2024-05-16 PROCEDURE — 1160F RVW MEDS BY RX/DR IN RCRD: CPT | Mod: CPTII,95,, | Performed by: FAMILY MEDICINE

## 2024-05-16 NOTE — PROGRESS NOTES
"Subjective     Patient ID: Mary Blair is a 58 y.o. female.    Chief Complaint: Lesion    Virtual visit with patient referred to Interventional Radiology by Santa Brown MD for evaluation of a right iliac bone lesion. Patient has a history of plasmacytoma of the lumbar spine (initially diagnosed in 2018). She is s/p back surgery, radiation, chemotherapy. Recent imaging with PET scan obtained on 3/7/2024 noted "New mild radiotracer uptake corresponding to small lytic foci in the right iliac bone." Patient has complaints of chronic back pain, but has not noticed any new back pain. She has complaints of decreased appetite, and endorses approximately an 80# unintentional weight loss over the past year.       Review of Systems   Constitutional:  Positive for appetite change (decreased; 80# weight loss in 1 year), chills and fever (has not measured). Negative for activity change and fatigue.   Respiratory:  Negative for cough, shortness of breath, wheezing and stridor.    Cardiovascular:  Negative for chest pain, palpitations and leg swelling.   Gastrointestinal:  Positive for nausea (occasional). Negative for abdominal distention, abdominal pain, constipation, diarrhea and vomiting.          Objective     Physical Exam  Constitutional:       General: She is not in acute distress.     Appearance: She is well-developed. She is not diaphoretic.   HENT:      Head: Normocephalic and atraumatic.   Pulmonary:      Effort: Pulmonary effort is normal. No respiratory distress.   Neurological:      Mental Status: She is alert and oriented to person, place, and time.   Psychiatric:         Behavior: Behavior normal.         Thought Content: Thought content normal.         Judgment: Judgment normal.       Reviewed oncology progress note    PET Scan 3/7/2024       Assessment and Plan     1. Bone lesion  -     Cancel: IR Biopsy Bone deep; Future; Expected date: 05/16/2024  -     CBC Auto Differential; Future; Expected date: " 05/16/2024  -     Protime-INR; Future; Expected date: 05/16/2024  -     IR CT Guidance; Future; Expected date: 05/16/2024    2. Plasmacytoma, unspecified plasmacytoma type, unspecified whether remission achieved  -     Cancel: IR Biopsy Bone deep; Future; Expected date: 05/16/2024  -     CBC Auto Differential; Future; Expected date: 05/16/2024  -     Protime-INR; Future; Expected date: 05/16/2024  -     IR CT Guidance; Future; Expected date: 05/16/2024        The patient location is: [place]  The chief complaint leading to consultation is: bone lesion    Visit type: audiovisual    Face to Face time with patient: 20 minutes  25 minutes of total time spent on the encounter, which includes face to face time and non-face to face time preparing to see the patient (eg, review of tests), Obtaining and/or reviewing separately obtained history, Documenting clinical information in the electronic or other health record, Independently interpreting results (not separately reported) and communicating results to the patient/family/caregiver, or Care coordination (not separately reported).         Each patient to whom he or she provides medical services by telemedicine is:  (1) informed of the relationship between the physician and patient and the respective role of any other health care provider with respect to management of the patient; and (2) notified that he or she may decline to receive medical services by telemedicine and may withdraw from such care at any time.    Notes:   Discussed case with Dr. Zaman. Explained to patient can offer biopsy of right iliac bone lesion. Discussed how the procedure will be performed, risks (including, but not limited to, pain, bleeding, infection, damage to nearby structures, and the need for additional procedures), benefits, possible complications, pre-post procedure expectations, and alternatives. The patient voices understanding and all questions have been answered.  The patient agrees  to proceed as planned. Patient scheduled for 5/21/2024. Clinic phone number provided.

## 2024-05-16 NOTE — Clinical Note
"Thank you for referring Ms. Blair to Interventional Radiology at the [facility]. Please don't hesitate to contact us if there are any questions regarding this evaluation at [phone]. If you have any other patients for whom you would like a consultation, please place an order for "[id number]", and we will be happy to review their case.  Sincerely, CE Coyne, FNP Interventional Radiology   "

## 2024-05-18 ENCOUNTER — LAB VISIT (OUTPATIENT)
Dept: LAB | Facility: HOSPITAL | Age: 59
End: 2024-05-18
Attending: FAMILY MEDICINE
Payer: COMMERCIAL

## 2024-05-18 DIAGNOSIS — M89.9 BONE LESION: ICD-10-CM

## 2024-05-18 DIAGNOSIS — C90.30 PLASMACYTOMA, UNSPECIFIED PLASMACYTOMA TYPE, UNSPECIFIED WHETHER REMISSION ACHIEVED: ICD-10-CM

## 2024-05-18 LAB
BASOPHILS # BLD AUTO: 0.04 K/UL (ref 0–0.2)
BASOPHILS NFR BLD: 0.7 % (ref 0–1.9)
DIFFERENTIAL METHOD BLD: NORMAL
EOSINOPHIL # BLD AUTO: 0.1 K/UL (ref 0–0.5)
EOSINOPHIL NFR BLD: 0.9 % (ref 0–8)
ERYTHROCYTE [DISTWIDTH] IN BLOOD BY AUTOMATED COUNT: 12.4 % (ref 11.5–14.5)
HCT VFR BLD AUTO: 39.8 % (ref 37–48.5)
HGB BLD-MCNC: 13 G/DL (ref 12–16)
IMM GRANULOCYTES # BLD AUTO: 0.01 K/UL (ref 0–0.04)
IMM GRANULOCYTES NFR BLD AUTO: 0.2 % (ref 0–0.5)
INR PPP: 1 (ref 0.8–1.2)
LYMPHOCYTES # BLD AUTO: 1.8 K/UL (ref 1–4.8)
LYMPHOCYTES NFR BLD: 33.5 % (ref 18–48)
MCH RBC QN AUTO: 30.8 PG (ref 27–31)
MCHC RBC AUTO-ENTMCNC: 32.7 G/DL (ref 32–36)
MCV RBC AUTO: 94 FL (ref 82–98)
MONOCYTES # BLD AUTO: 0.5 K/UL (ref 0.3–1)
MONOCYTES NFR BLD: 9.1 % (ref 4–15)
NEUTROPHILS # BLD AUTO: 3.1 K/UL (ref 1.8–7.7)
NEUTROPHILS NFR BLD: 55.6 % (ref 38–73)
NRBC BLD-RTO: 0 /100 WBC
PLATELET # BLD AUTO: 202 K/UL (ref 150–450)
PMV BLD AUTO: 9.9 FL (ref 9.2–12.9)
PROTHROMBIN TIME: 10.8 SEC (ref 9–12.5)
RBC # BLD AUTO: 4.22 M/UL (ref 4–5.4)
WBC # BLD AUTO: 5.5 K/UL (ref 3.9–12.7)

## 2024-05-18 PROCEDURE — 36415 COLL VENOUS BLD VENIPUNCTURE: CPT | Performed by: FAMILY MEDICINE

## 2024-05-18 PROCEDURE — 85610 PROTHROMBIN TIME: CPT | Performed by: FAMILY MEDICINE

## 2024-05-18 PROCEDURE — 85025 COMPLETE CBC W/AUTO DIFF WBC: CPT | Performed by: FAMILY MEDICINE

## 2024-05-21 ENCOUNTER — HOSPITAL ENCOUNTER (OUTPATIENT)
Facility: OTHER | Age: 59
Discharge: HOME | End: 2024-05-21
Attending: RADIOLOGY | Admitting: RADIOLOGY
Payer: COMMERCIAL

## 2024-05-21 VITALS
WEIGHT: 116 LBS | DIASTOLIC BLOOD PRESSURE: 55 MMHG | HEIGHT: 62 IN | SYSTOLIC BLOOD PRESSURE: 95 MMHG | TEMPERATURE: 98 F | BODY MASS INDEX: 21.35 KG/M2 | RESPIRATION RATE: 16 BRPM | HEART RATE: 69 BPM | OXYGEN SATURATION: 95 %

## 2024-05-21 DIAGNOSIS — M89.9 BONE LESION: ICD-10-CM

## 2024-05-21 DIAGNOSIS — C90.30 PLASMACYTOMA, UNSPECIFIED PLASMACYTOMA TYPE, UNSPECIFIED WHETHER REMISSION ACHIEVED: ICD-10-CM

## 2024-05-21 PROCEDURE — 63600175 PHARM REV CODE 636 W HCPCS: Performed by: RADIOLOGY

## 2024-05-21 PROCEDURE — 88311 DECALCIFY TISSUE: CPT | Mod: 26,,, | Performed by: PATHOLOGY

## 2024-05-21 PROCEDURE — 88311 DECALCIFY TISSUE: CPT | Performed by: PATHOLOGY

## 2024-05-21 PROCEDURE — 99152 MOD SED SAME PHYS/QHP 5/>YRS: CPT | Performed by: RADIOLOGY

## 2024-05-21 PROCEDURE — 88341 IMHCHEM/IMCYTCHM EA ADD ANTB: CPT | Mod: 59 | Performed by: PATHOLOGY

## 2024-05-21 PROCEDURE — 88341 IMHCHEM/IMCYTCHM EA ADD ANTB: CPT | Mod: 26,,, | Performed by: PATHOLOGY

## 2024-05-21 PROCEDURE — 20220 BONE BIOPSY TROCAR/NDL SUPFC: CPT

## 2024-05-21 PROCEDURE — 88342 IMHCHEM/IMCYTCHM 1ST ANTB: CPT | Mod: 26,59,, | Performed by: PATHOLOGY

## 2024-05-21 PROCEDURE — 88364 INSITU HYBRIDIZATION (FISH): CPT | Performed by: PATHOLOGY

## 2024-05-21 PROCEDURE — 88365 INSITU HYBRIDIZATION (FISH): CPT | Performed by: PATHOLOGY

## 2024-05-21 PROCEDURE — 88365 INSITU HYBRIDIZATION (FISH): CPT | Mod: 26,,, | Performed by: PATHOLOGY

## 2024-05-21 PROCEDURE — 88305 TISSUE EXAM BY PATHOLOGIST: CPT | Mod: 59 | Performed by: PATHOLOGY

## 2024-05-21 PROCEDURE — 85097 BONE MARROW INTERPRETATION: CPT | Mod: ,,, | Performed by: PATHOLOGY

## 2024-05-21 PROCEDURE — 88342 IMHCHEM/IMCYTCHM 1ST ANTB: CPT | Performed by: PATHOLOGY

## 2024-05-21 PROCEDURE — 99153 MOD SED SAME PHYS/QHP EA: CPT | Performed by: RADIOLOGY

## 2024-05-21 PROCEDURE — 88313 SPECIAL STAINS GROUP 2: CPT | Performed by: PATHOLOGY

## 2024-05-21 PROCEDURE — 88313 SPECIAL STAINS GROUP 2: CPT | Mod: 26,,, | Performed by: PATHOLOGY

## 2024-05-21 PROCEDURE — 88305 TISSUE EXAM BY PATHOLOGIST: CPT | Mod: 26,,, | Performed by: PATHOLOGY

## 2024-05-21 RX ORDER — ACETAMINOPHEN 325 MG/1
650 TABLET ORAL EVERY 4 HOURS PRN
Status: DISCONTINUED | OUTPATIENT
Start: 2024-05-21 | End: 2024-05-21 | Stop reason: HOSPADM

## 2024-05-21 RX ORDER — MIDAZOLAM HYDROCHLORIDE 1 MG/ML
INJECTION INTRAMUSCULAR; INTRAVENOUS
Status: DISCONTINUED | OUTPATIENT
Start: 2024-05-21 | End: 2024-05-21 | Stop reason: HOSPADM

## 2024-05-21 RX ORDER — FENTANYL CITRATE 50 UG/ML
INJECTION, SOLUTION INTRAMUSCULAR; INTRAVENOUS
Status: DISCONTINUED | OUTPATIENT
Start: 2024-05-21 | End: 2024-05-21 | Stop reason: HOSPADM

## 2024-05-21 RX ORDER — ONDANSETRON HYDROCHLORIDE 2 MG/ML
INJECTION, SOLUTION INTRAVENOUS
Status: DISCONTINUED | OUTPATIENT
Start: 2024-05-21 | End: 2024-05-21 | Stop reason: HOSPADM

## 2024-05-21 NOTE — H&P
Consult/H&P Note  Interventional Radiology    Consult Requested By: oncology    Reason for Consult: bone lesion    SUBJECTIVE:     Chief Complaint: bone lesion    History of Present Illness: 59 yo F with bone lesions, concerning for plasmacytoma.    Past Medical History:   Diagnosis Date    Cancer     Plasmacytoma     Rotator cuff disorder     Spinal stenosis      Past Surgical History:   Procedure Laterality Date    BONE MARROW BIOPSY Left 04/10/2024    Procedure: Biopsy-bone marrow;  Surgeon: Santa Brown MD;  Location: [facility] ([address] [place] [address]);  Service: Oncology;  Laterality: Left;  -precall complete-MS     SECTION      corpal tunnel      HERNIA REPAIR      hiatal hernia repair    JOINT REPLACEMENT      left hip x2    ROTATOR CUFF REPAIR      SPINE SURGERY      lumbar    TONSILLECTOMY       Family History   Problem Relation Name Age of Onset    Heart disease Mother      Heart disease Father      No Known Problems Sister      No Known Problems Brother      No Known Problems Maternal Aunt      No Known Problems Maternal Uncle      No Known Problems Paternal Aunt      No Known Problems Paternal Uncle      Cataracts Maternal Grandmother      No Known Problems Maternal Grandfather      No Known Problems Paternal Grandmother      No Known Problems Paternal Grandfather      Amblyopia Neg Hx      Blindness Neg Hx      Cancer Neg Hx      Diabetes Neg Hx      Glaucoma Neg Hx      Hypertension Neg Hx      Macular degeneration Neg Hx      Retinal detachment Neg Hx      Strabismus Neg Hx      Stroke Neg Hx      Thyroid disease Neg Hx       Social History     Tobacco Use    Smoking status: Never    Smokeless tobacco: Never   Substance Use Topics    Alcohol use: Yes     Comment: occassionally    Drug use: No       Review of Systems:  Constitutional/General:No fever, chills, change in appetite or weight loss.  Hematological/Immuno: no known coagulopathies  Respiratory: no shortness of breath  Cardiovascular: no  "chest pain  Gastrointestinal: no abdominal pain  Genito-Urinary: no dysuria  Musculoskeletal: negative  Skin: Negative for rash, itching, pigmentation changes, nail or hair changes.  Neurological: no TIA or stroke symptoms  Psychiatric: normal mood/affect, good insight/judgement      OBJECTIVE:     Vital Signs Range (Last 24H):  Temp:  [97.9 °F (36.6 °C)]   Pulse:  [59-81]   Resp:  [5-20]   BP: (102-119)/(55-73)   SpO2:  [96 %-100 %]     Physical Exam:  General- Patient alert and oriented x3 in NAD  ENT- PERRLA,  Neck- No masses  CV- Regular rate and rhythm  Resp-  No increased WOB  GI- Non tender/non-distended  Extrem- No cyanosis, clubbing, edema.   Derm- No rashes, masses, or lesions noted  Neuro-  No focal deficits noted.     Physical Exam  Body mass index is 21.22 kg/m².    Scheduled Meds:   Continuous Infusions:   PRN Meds:  Current Facility-Administered Medications:     acetaminophen, 650 mg, Oral, Q4H PRN    fentaNYL, , , PRN    midazolam, , , PRN    ondansetron, , , PRN    Allergies: Review of patient's allergies indicates:  No Known Allergies    Labs:  Recent Labs   Lab 05/18/24  1115   INR 1.0       Recent Labs   Lab 05/18/24  1115   WBC 5.50   HGB 13.0   HCT 39.8   MCV 94       No results for input(s): "GLU", "NA", "K", "CL", "CO2", "BUN", "CREATININE", "CALCIUM", "MG", "ALT", "AST", "ALBUMIN", "BILITOT", "BILIDIR" in the last 168 hours.    Vitals (Most Recent):  Temp: 97.9 °F (36.6 °C) (05/21/24 0825)  Pulse: (!) 59 (05/21/24 1043)  Resp: 16 (05/21/24 1043)  BP: (!) 106/58 (05/21/24 1043)  SpO2: 96 % (05/21/24 1043)    ASA: 2  Mallampati: 2    Consent obtained    ASSESSMENT/PLAN:     Biopsy of R iliac lesion, moderate sedation.    There are no hospital problems to display for this patient.          Harjeet Bro MD   "

## 2024-05-21 NOTE — PROCEDURES
Radiology Post-Procedure Note    Pre Op Diagnosis: R iliac bone lesion  Post Op Diagnosis: Same    Procedure: biopsy    Procedure performed by: Harjeet Bro MD    Written Informed Consent Obtained: Yes  Specimen Removed: YES cores and aspirates  Estimated Blood Loss: Minimal    Findings:   Biopsy of R iliac lesion.    Patient tolerated procedure well.    Harjeet Bro MD

## 2024-05-21 NOTE — DISCHARGE INSTRUCTIONS
Recovery After Procedural Sedation (Adult)   You have been given medicine by vein to make you sleep during your surgery. This may have included both a pain medicine and sleeping medicine. Most of the effects have worn off. But you may still have some drowsiness for the next 6 to 8 hours.  Home care  Follow these guidelines when you get home:  For the next 8 hours, you should be watched by a responsible adult. This person should make sure your condition is not getting worse.  Don't drink any alcohol for the next 24 hours.  Don't drive, operate dangerous machinery, or make important business or personal decisions during the next 24 hours.  To prevent injury or falls, use caution when standing and walking for at least 24 hours after your procedure.  Note: Your healthcare provider may tell you not to take any medicine by mouth for pain or sleep in the next 4 hours. These medicines may react with the medicines you were given in the hospital. This could cause a much stronger response than usual.  Follow-up care  Follow up with your healthcare provider if you are not alert and back to your usual level of activity within 12 hours.  When to seek medical advice  Call your healthcare provider right away if any of these occur:  Drowsiness gets worse  Weakness or dizziness gets worse  Repeated vomiting  You can't be awakened  Fever  New rash  Cynthia last reviewed this educational content on 9/1/2019  © 3649-3169 [employer] [address] [place] [address], [place] [address]. All rights reserved. This information is not intended as a substitute for professional medical care. Always follow your healthcare professional's instructions.

## 2024-05-21 NOTE — DISCHARGE SUMMARY
Radiology Discharge Summary      Hospital Course: No complications    Admit Date: 5/21/2024  Discharge Date: 05/21/2024     Instructions Given to Patient: Yes  Diet: Resume prior diet  Activity: activity as tolerated and no driving for today    Description of Condition on Discharge: Stable  Vital Signs (Most Recent): Temp: 97.9 °F (36.6 °C) (05/21/24 0825)  Pulse: 81 (05/21/24 1010)  Resp: 19 (05/21/24 1010)  BP: (!) 115/59 (05/21/24 1010)  SpO2: 100 % (05/21/24 1010)    Discharge Disposition: Home    Discharge Diagnosis: bone lesion     Follow-up: per oncology    Harjeet Bro MD

## 2024-05-24 LAB
ADEQUACY: NORMAL
FINAL PATHOLOGIC DIAGNOSIS: NORMAL
GROSS: NORMAL
Lab: NORMAL
MICROSCOPIC EXAM: NORMAL

## 2024-05-31 ENCOUNTER — OFFICE VISIT (OUTPATIENT)
Dept: HEMATOLOGY/ONCOLOGY | Facility: CLINIC | Age: 59
End: 2024-05-31
Payer: COMMERCIAL

## 2024-05-31 ENCOUNTER — LAB VISIT (OUTPATIENT)
Dept: LAB | Facility: HOSPITAL | Age: 59
End: 2024-05-31
Payer: COMMERCIAL

## 2024-05-31 VITALS
SYSTOLIC BLOOD PRESSURE: 111 MMHG | BODY MASS INDEX: 22.82 KG/M2 | RESPIRATION RATE: 16 BRPM | HEIGHT: 62 IN | TEMPERATURE: 98 F | WEIGHT: 124 LBS | HEART RATE: 62 BPM | DIASTOLIC BLOOD PRESSURE: 63 MMHG | OXYGEN SATURATION: 99 %

## 2024-05-31 DIAGNOSIS — R63.4 WEIGHT LOSS, ABNORMAL: ICD-10-CM

## 2024-05-31 DIAGNOSIS — D49.89 PLASMA CELL NEOPLASM: ICD-10-CM

## 2024-05-31 DIAGNOSIS — C90.32 PLASMACYTOMA IN RELAPSE, UNSPECIFIED PLASMACYTOMA TYPE: Primary | ICD-10-CM

## 2024-05-31 DIAGNOSIS — C90.32 PLASMACYTOMA IN RELAPSE, UNSPECIFIED PLASMACYTOMA TYPE: ICD-10-CM

## 2024-05-31 DIAGNOSIS — M89.8X9 BONE PAIN: ICD-10-CM

## 2024-05-31 LAB
ALBUMIN SERPL BCP-MCNC: 3.6 G/DL (ref 3.5–5.2)
ALP SERPL-CCNC: 75 U/L (ref 55–135)
ALT SERPL W/O P-5'-P-CCNC: 22 U/L (ref 10–44)
ANION GAP SERPL CALC-SCNC: 6 MMOL/L (ref 8–16)
AST SERPL-CCNC: 20 U/L (ref 10–40)
BASOPHILS # BLD AUTO: 0.04 K/UL (ref 0–0.2)
BASOPHILS NFR BLD: 0.9 % (ref 0–1.9)
BILIRUB SERPL-MCNC: 0.2 MG/DL (ref 0.1–1)
BUN SERPL-MCNC: 10 MG/DL (ref 8–23)
CALCIUM SERPL-MCNC: 9.2 MG/DL (ref 8.7–10.5)
CHLORIDE SERPL-SCNC: 108 MMOL/L (ref 95–110)
CO2 SERPL-SCNC: 28 MMOL/L (ref 23–29)
CREAT SERPL-MCNC: 0.9 MG/DL (ref 0.5–1.4)
DIFFERENTIAL METHOD BLD: ABNORMAL
EOSINOPHIL # BLD AUTO: 0.1 K/UL (ref 0–0.5)
EOSINOPHIL NFR BLD: 2 % (ref 0–8)
ERYTHROCYTE [DISTWIDTH] IN BLOOD BY AUTOMATED COUNT: 12.6 % (ref 11.5–14.5)
EST. GFR  (NO RACE VARIABLE): >60 ML/MIN/1.73 M^2
GLUCOSE SERPL-MCNC: 95 MG/DL (ref 70–110)
HCT VFR BLD AUTO: 38.7 % (ref 37–48.5)
HGB BLD-MCNC: 12.2 G/DL (ref 12–16)
IMM GRANULOCYTES # BLD AUTO: 0.02 K/UL (ref 0–0.04)
IMM GRANULOCYTES NFR BLD AUTO: 0.4 % (ref 0–0.5)
LYMPHOCYTES # BLD AUTO: 1.8 K/UL (ref 1–4.8)
LYMPHOCYTES NFR BLD: 38.3 % (ref 18–48)
MCH RBC QN AUTO: 31 PG (ref 27–31)
MCHC RBC AUTO-ENTMCNC: 31.5 G/DL (ref 32–36)
MCV RBC AUTO: 99 FL (ref 82–98)
MONOCYTES # BLD AUTO: 0.4 K/UL (ref 0.3–1)
MONOCYTES NFR BLD: 9.6 % (ref 4–15)
NEUTROPHILS # BLD AUTO: 2.3 K/UL (ref 1.8–7.7)
NEUTROPHILS NFR BLD: 48.8 % (ref 38–73)
NRBC BLD-RTO: 0 /100 WBC
PLATELET # BLD AUTO: 245 K/UL (ref 150–450)
PMV BLD AUTO: 9.6 FL (ref 9.2–12.9)
POTASSIUM SERPL-SCNC: 4.7 MMOL/L (ref 3.5–5.1)
PROT SERPL-MCNC: 6.2 G/DL (ref 6–8.4)
RBC # BLD AUTO: 3.93 M/UL (ref 4–5.4)
SODIUM SERPL-SCNC: 142 MMOL/L (ref 136–145)
WBC # BLD AUTO: 4.6 K/UL (ref 3.9–12.7)

## 2024-05-31 PROCEDURE — 85025 COMPLETE CBC W/AUTO DIFF WBC: CPT | Performed by: INTERNAL MEDICINE

## 2024-05-31 PROCEDURE — 3074F SYST BP LT 130 MM HG: CPT | Mod: CPTII,S$GLB,, | Performed by: INTERNAL MEDICINE

## 2024-05-31 PROCEDURE — 80053 COMPREHEN METABOLIC PANEL: CPT | Performed by: INTERNAL MEDICINE

## 2024-05-31 PROCEDURE — 99214 OFFICE O/P EST MOD 30 MIN: CPT | Mod: S$GLB,,, | Performed by: INTERNAL MEDICINE

## 2024-05-31 PROCEDURE — 3008F BODY MASS INDEX DOCD: CPT | Mod: CPTII,S$GLB,, | Performed by: INTERNAL MEDICINE

## 2024-05-31 PROCEDURE — 36415 COLL VENOUS BLD VENIPUNCTURE: CPT | Performed by: INTERNAL MEDICINE

## 2024-05-31 PROCEDURE — 99999 PR PBB SHADOW E&M-EST. PATIENT-LVL III: CPT | Mod: PBBFAC,,, | Performed by: INTERNAL MEDICINE

## 2024-05-31 PROCEDURE — 3078F DIAST BP <80 MM HG: CPT | Mod: CPTII,S$GLB,, | Performed by: INTERNAL MEDICINE

## 2024-05-31 NOTE — PROGRESS NOTES
"  PATIENT: Mary Blair  MRN: [id number]  DATE: 5/31/2024      Diagnosis:   No diagnosis found.          Chief Complaint: No chief complaint on file.      Mary Blair is a 59 yo F with Hx of plasmacytoma of the lumbar spine, previously treated at [facility] with plasmacytoma .     Follow up 9/15/2020  - C/o chronic back pain much worse than before, neck/shoulder pain, elbow, back pain, ankles all over. Seeing a spine surgeon Dr. Vieyra and had MRI in 6/2020 which revealed mild disc prolapse in lumbar disc  - c/o feeling cold mostly evening, but never had temperature >100.4. No weight loss, vomiting, headaches, diarrhea or bleeding issues  - Currently on cymbalta for fibromyalgia and Tizanidine for back spasms  - Her myeloma labs SPEP, Immunoglobulins, free LT chains, MIRAN are pending  - 12/20/2019- No FDG PET/CT findings to suggest recurrent or metastatic disease    Hematology History   She initially presented with back pain in early February of 2018 and underwent bone scan on 2/27/2018 which revealed Increased uptake within L3 body corresponding to lytic lesion seen on 2/16/18 CT and underwent back surgery on 2/28/2018. A bone marrow biopsy done on 3/8/2018 was negative for any malignancy. However the biopsy of L3 vertebral body lesion revealed lambda restricted plasma cell neoplasm and skeletal survey done on 3/21/2018 revealed "Periprosthetic lucency associated with the left proximal femur potentially reflecting osteolysis rather than malignancy". She underwent ratiation threapy to L3, L4 and L5 of spine from March to May 8, 2018 for a total of 8 weeks due to suspicion for invasion of adjacent vertebrae.      4/11/2018 xray of L-spine revealed no suspicious osseous lesions and intact posterior spinal fusion L2-L4.  4/13/2018 - MIRNA + UPEP- Normal immunofixation pattern  4/26/2018 - CT abdo/pelvis- NO suspicious osseous lesions identified  4/26/2018 - CT Thoracic Spine W/WO - No suspicious osseous lesions " identified.    5/23/2018 - B2 microglobulin:  1.8 mg/L (ref 0.6-2.4). CBC, CMP unremarkable   - Immunoglobulins: Normal   - LDH: 178 ()    - SPEP- Total protein normal.  Apparent normal immunofixation pattern. Normal FLC ratio     8/03/18 - MRI C-spine WO - No focal lesions observed  8/17/18 - MRI L-Spine W/WO - No focal lesions observed  9/24/18 - MRI brain W/Wo- Normal study  10/24/2018 -  Normal immunoglobulins, LDH. Normal SPEP and MIRNA. No M spike seen. Normal FLC ratio     She c/o chronic back pain, denies any new complaints.      She has PMHx of L hip replacements in 1986 and 2010, B/L carpal tunnel s/p surgery, R rotator cuff tear and trigger finger. She is a non smoker and drinks alcohol socially     She has family history of skin cancer and lung in grand mother      Subjective:    Initial History: Ms. Blair is a 59 y.o. female who returns for follow up. She had  PET CT    Allergies:  Review of patient's allergies indicates:  No Known Allergies    Medications:  Current Outpatient Medications   Medication Sig Dispense Refill    amitriptyline (ELAVIL) 10 MG tablet Take 1 tablet (10 mg total) by mouth every evening. (Patient not taking: Reported on 8/3/2023) 30 tablet 11    CELEBREX 100 mg capsule 100 mg.      cyclobenzaprine (FLEXERIL) 5 MG tablet Take 1 tablet (5 mg total) by mouth 3 (three) times daily as needed for Muscle spasms. 60 tablet 3    DULoxetine (CYMBALTA) 60 MG capsule Take 60 mg by mouth once daily.       ondansetron (ZOFRAN-ODT) 8 MG TbDL Take 8 mg by mouth 2 (two) times daily as needed.      solifenacin (VESICARE) 10 MG tablet Take 10 mg by mouth.      tiZANidine (ZANAFLEX) 4 MG tablet Take 4 mg by mouth 3 (three) times daily as needed.       No current facility-administered medications for this visit.       Review of Systems   Constitutional:  Negative for appetite change, chills, diaphoresis and fever.   HENT:  Negative for nosebleeds and trouble swallowing.         Occasional  headache   Respiratory:  Negative for cough and shortness of breath.    Cardiovascular:  Negative for chest pain.   Gastrointestinal:  Negative for abdominal pain, blood in stool, diarrhea, nausea and vomiting.   Genitourinary:  Negative for hematuria.   Musculoskeletal:  Positive for back pain and neck pain.   Skin:  Negative for rash.   Neurological:  Negative for seizures, syncope and headaches.   Hematological:  Negative for adenopathy.   Psychiatric/Behavioral:  Negative for agitation and behavioral problems. The patient is nervous/anxious.      There were no vitals filed for this visit.        Physical Exam  Constitutional:       Appearance: Normal appearance.   HENT:      Head: Normocephalic and atraumatic.      Nose: Nose normal.      Mouth/Throat:      Mouth: Mucous membranes are moist.   Eyes:      Extraocular Movements: Extraocular movements intact.      Pupils: Pupils are equal, round, and reactive to light.   Cardiovascular:      Rate and Rhythm: Normal rate and regular rhythm.   Pulmonary:      Effort: Pulmonary effort is normal.      Breath sounds: Normal breath sounds.   Abdominal:      General: Abdomen is flat. Bowel sounds are normal.      Palpations: Abdomen is soft.   Musculoskeletal:         General: No swelling. Normal range of motion.      Cervical back: Normal range of motion and neck supple.   Skin:     General: Skin is warm and dry.      Capillary Refill: Capillary refill takes less than 2 seconds.   Neurological:      General: No focal deficit present.      Mental Status: She is alert and oriented to person, place, and time.   Psychiatric:         Mood and Affect: Mood normal.         Imagin/20/2019 PET CT    1.  No definite evidence of active osseous disease.  In this patient with L3 plasmacytoma status post radiation therapy, there is mild nonspecific uptake about the L3 vertebroplasty cement favored to represent postoperative inflammation status post removal of lumbar fusion  hardware.  Attention on follow-up recommended.    2.  No evidence of extramedullary disease.  Mild soft tissue thickening overlying the posterior paraspinous musculature at the L2-L4 level status post removal of hardware.  Mild tracer uptake within this region compatible with soft tissue infectious/noninfectious inflammation.      9/23/22 PET CT       In this patient with history of L3 vertebral body plasmacytoma status post vertebral augmentation, there is no focal abnormal radiotracer uptake to suggest local recurrence.  No new hypermetabolic disease elsewhere.     No hypermetabolic or CT abnormality correlating with lesion of the right posterior ilium noted on prior MR. Of note, lesion likely too small to characterize with PET.  Recommend attention on expected follow-up exams.      12/1/23 CT lumbar spine    Prior vertebral augmentation of the L3 vertebral body, with extrusion of augmentation material along the ventral aspect of the thecal sac.  Mild heterogeneity about the L3 vertebral body and posterior elements, likely representing sequela of post treatment change.  No evidence for acute fracture or new osseous destructive lesion.  No abnormal epidural enhancement or surrounding abnormal soft tissue enhancement.     Multilevel lumbar spondylosis as detailed above, worst at L4-L5, which contributes to moderate spinal canal stenosis as well as moderate bilateral neural foraminal narrowing.     Grade 1 anterolisthesis of L4 on L5.     Additional findings as above.    12/1/23 MRI pelvis    COMPARISON:  Multiple CTs, most recent 12/01/2023; MRI spine 06/10/2022     FINDINGS:  BONE: Multiple subcentimeter STIR hyperintense lesions scattered throughout the pelvis and right proximal femur measuring up to 0.6 cm, for example in the right posterior ilium on 4:7.  No cortical destruction or extraosseous extension.  The posterior iliac lesion is unchanged from 06/10/2022 spine MRI.  Other lesions were outside the field of  view.  No corresponding abnormalities on previous CTs.     Partially imaged postoperative changes from vertebral augmentation at L3.  There is fatty metaplasia in the lower lumbar spine, compatible with post radiation changes.     No fracture or osteonecrosis.     JOINT: Postoperative changes from left hip arthroplasty.  Susceptibility artifact markedly degrades evaluation of adjacent structures, including perihardware bone.  No joint effusion or synovitis in the right hip.  Multilevel degenerative changes in the lower lumbar spine.     SOFT TISSUE: Partial tear of the right proximal hamstring tendon.  Narrowing of the left ischial femoral space with bony proliferative changes, adventitial bursa formation, and atrophy of quadratus femoris.     MISCELLANEOUS: No visceral pelvic soft tissue abnormality.     Impression:     Partially imaged posttreatment changes in the lumbar spine for an L3 plasmacytoma.     Multiple subcentimeter lesions scattered throughout the pelvis and right proximal femur, concerning for multiple myeloma.  Stability is difficult to determine as many were outside the field of view on previous MRI.  Correlate with lab values.     Left ischiofemoral impingement.     Partial tear of the right proximal hamstring tendon.       2/2/24 sIFE: No monoclonal peaks identified.       3/7/24 PET CT  COMPARISON:  PET-CT 09/23/2022, 09/23/2020, MRI pelvis 12/01/2023     FINDINGS:  Quality of the study: Adequate.     In the head and neck, there are no hypermetabolic mucosal lesions, and there are no pathologically enlarged or hypermetabolic lymph nodes.     In the chest, there are no hypermetabolic lesions worrisome for malignancy.  There are no concerning pulmonary nodules or masses, and there are no pathologically enlarged or hypermetabolic lymph nodes.     In the abdomen and pelvis, there is physiologic tracer distribution within the abdominal organs and excretion into the genitourinary system.     In the  bones, there is a new small hypermetabolic lytic lesion in the right occipital condyle (image 51) measuring 0.8 cm with SUV max 4.     New mild radiotracer uptake corresponding to a small lytic lesion in the right posterior ilium (image 214) with SUV max 2.7, with corresponding lesion identified on prior MRI.     New mild radiotracer uptake corresponding with a punctate lucent focus within the right iliac bone near the sacroiliac joint (image 227) with maximum SUV of 2.3, with corresponding lesion identified on prior MRI.     Stable appearance of the L3 vertebral body status post vertebral augmentation, with normal background level uptake.     In the extremities, there are no hypermetabolic lesions worrisome for malignancy.     Increased physiologic skeletal muscle uptake within the bilateral upper and lower extremities.     Additional CT findings: Surgical clips adjacent to the spleen.  Small fat containing umbilical hernia.  Left hip arthroplasty.     Impression:     New small hypermetabolic lytic lesion in the right occipital condyle.  New mild radiotracer uptake corresponding to small lytic foci in the right iliac bone, with corresponding lesions identified on prior MRI.  Findings are concerning for myeloma.     Stable appearance of the L3 vertebral body status post vertebral augmentation, with normal background level uptake.           4/10/24 RIGHT ILIAC CREST BONE MARROW ASPIRATE, BONE MARROW CLOT, AND BONE MARROW CORE BIOPSY WITH:     CELLULARITY=30%, TRILINEAGE HEMATOPOIETIC ACTIVITY (M/E=2.2:1).   NO DEFINITIVE MORPHOLOGIC OR IMMUNOPHENOTYPIC EVIDENCE OF LYMPHOMA OR PLASMA CELL NEOPLASM.  SEE COMMENT.   GRADE 1-2 RETICULAR FIBROSIS.   INCREASED STORAGE IRON.   ADEQUATE NUMBER OF MEGAKARYOCYTES.       Interp, Plasma Cell Prolif (PCPD), FISH:  The result is within normal limits for 1q duplication, TP53 deletion and IGH rearrangement.       Immunohistochemical studies were performed on the clot and core biopsy  for greater sensitivity and further architecture evaluation with adequate positive and negative controls.  Scattered mixed  T cells (CD3 positive, CD5 positive) and B cells (CD20   positive, BCL-6 negative, cyclin D1 negative) are evident.  About 6% plasma cells ( positive) are noted.  In situ hybridization for kappa and lambda light chain shows polytypic plasma cells.      There is no definitive morphologic or immunophenotypic evidence of lymphoma or plasma cell neoplasm.  Correlate clinically and with a cytogenetics report.       4/22/24 CT head      Impression:     Small lytic but well corticated lesion involving the right occipital condyle, compatible with reported history of plasmacytoma/myeloma.  Please see details/discussion above     No acute intracranial abnormality with an otherwise normal appearance of the brain    5/21/24 iliac bone  biopsy (laterality not specified) with core biopsies, clot, and aspirate smears:     --Suboptimal sampling showing minute fragments of trilineage marrow, of approximately 40% with mildly increased plasma cells, insufficient for definitive diagnosis, see comment     Comment: No flow cytometry was ordered.  No additional studies were ordered.      This sampling shows very limited cellularity.  On 1 of the aspirate smears there are focal areas with increased plasma cells noted.  However, there is limited sampling for further evaluation on the clot and core biopsy sections.  On the clot sections,   plasma cells are not significantly increased by  immunohistochemical stain, estimated at 5% cellularity.  Clonality by kappa and lambda in situ hybridization studies was not definiitive.     The clinical concern for plasmacytoma is noted; however, this can not be verified definitively on the available sampling.           Assessment:             1. 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  PET/CT done on 12/20/2019 did not  suggest any recurrent or metastatic disease. She had no bone or soft tissue lesions suggestive of plasma cytoma in Sept 2022. MRI pelvis done in Dec 2022 demonstrated multiple subcentimeter lesions scattered throughout the pelvis and right proximal femur.   PET CT done on 3/7/24 showed a new small hypermetabolic lytic lesion in the right occipital condyle. There was a new mild radiotracer uptake corresponding to small lytic foci in the right iliac bone.  No monoclonal protein on serum MIRNA on 2/2/24. CBC, CMP normal. sFLC ratio normal. Bone marrow biopsy on 4/10/24 did not demonstrate any definitive morphologic or immunophenotypic evidence of lymphoma or plasma cell neoplasm  The right occipital condyle lesion was high risk for complications if biopsied  per IR.   She underwent biopsy of a lesion in right iliac bone on 5/21/24.  It was suboptimal sample. It showed minute fragments of trilineage marrow, of approximately 40% with mildly increased plasma cells, insufficient for definitive diagnosis.  She will have repeat PET CT, repeat SPEP and serum free light chains in 4 months.      2. Generalized pain:  c/o chronic pain in the back, shoulders, hands and legs. She was diagnosed with fibromyalgia and currently on Cymbalta with moderate improvement. She tried gabapentin and Lyrica in the past with little benefit.     3. Bilateral Carpal tunnel syndrome : s/p surgery             BMT Chart Routing      Follow up with physician 4 months.   Follow up with AMANDA    Provider visit type    Infusion scheduling note    Injection scheduling note    Labs CBC, CMP, free light chains, SPEP and immunofixation   Scheduling:  Preferred lab:  Lab interval:     Imaging PET scan      Pharmacy appointment    Other referrals

## 2024-06-20 ENCOUNTER — HOSPITAL ENCOUNTER (OUTPATIENT)
Dept: RADIOLOGY | Facility: HOSPITAL | Age: 59
Discharge: HOME | End: 2024-06-20
Attending: INTERNAL MEDICINE
Payer: COMMERCIAL

## 2024-06-20 DIAGNOSIS — D49.89 PLASMA CELL NEOPLASM: ICD-10-CM

## 2024-06-20 DIAGNOSIS — C90.32 PLASMACYTOMA IN RELAPSE, UNSPECIFIED PLASMACYTOMA TYPE: ICD-10-CM

## 2024-06-20 LAB — POCT GLUCOSE: 99 MG/DL (ref 70–110)

## 2024-06-20 PROCEDURE — 78816 PET IMAGE W/CT FULL BODY: CPT | Mod: TC

## 2024-06-20 PROCEDURE — A9552 F18 FDG: HCPCS | Performed by: INTERNAL MEDICINE

## 2024-06-20 RX ORDER — FLUDEOXYGLUCOSE F18 500 MCI/ML
12.21 INJECTION INTRAVENOUS
Status: COMPLETED | OUTPATIENT
Start: 2024-06-20 | End: 2024-06-20

## 2024-06-20 RX ADMIN — FLUDEOXYGLUCOSE F-18 12.21 MILLICURIE: 500 INJECTION INTRAVENOUS at 09:06

## 2024-08-22 ENCOUNTER — PATIENT MESSAGE (OUTPATIENT)
Dept: HEMATOLOGY/ONCOLOGY | Facility: CLINIC | Age: 59
End: 2024-08-22
Payer: COMMERCIAL

## 2024-08-22 ENCOUNTER — OFFICE VISIT (OUTPATIENT)
Dept: HEMATOLOGY/ONCOLOGY | Facility: CLINIC | Age: 59
End: 2024-08-22
Payer: COMMERCIAL

## 2024-08-22 VITALS
HEART RATE: 72 BPM | SYSTOLIC BLOOD PRESSURE: 110 MMHG | WEIGHT: 114.44 LBS | OXYGEN SATURATION: 98 % | TEMPERATURE: 99 F | HEIGHT: 62 IN | BODY MASS INDEX: 21.06 KG/M2 | RESPIRATION RATE: 18 BRPM | DIASTOLIC BLOOD PRESSURE: 70 MMHG

## 2024-08-22 DIAGNOSIS — C90.30 SOLITARY PLASMACYTOMA, UNSPECIFIED WHETHER REMISSION ACHIEVED: ICD-10-CM

## 2024-08-22 DIAGNOSIS — M89.8X9 BONE PAIN: ICD-10-CM

## 2024-08-22 DIAGNOSIS — C90.32 PLASMACYTOMA IN RELAPSE, UNSPECIFIED PLASMACYTOMA TYPE: Primary | ICD-10-CM

## 2024-08-22 LAB
BACTERIA #/AREA URNS AUTO: ABNORMAL /HPF
BILIRUB UR QL STRIP: NEGATIVE
CAOX CRY UR QL COMP ASSIST: ABNORMAL
CLARITY UR REFRACT.AUTO: ABNORMAL
COLOR UR AUTO: YELLOW
GLUCOSE UR QL STRIP: NEGATIVE
HGB UR QL STRIP: ABNORMAL
HYALINE CASTS UR QL AUTO: 0 /LPF
KETONES UR QL STRIP: NEGATIVE
LEUKOCYTE ESTERASE UR QL STRIP: NEGATIVE
MICROSCOPIC COMMENT: ABNORMAL
NITRITE UR QL STRIP: NEGATIVE
PH UR STRIP: 6 [PH] (ref 5–8)
PROT UR QL STRIP: ABNORMAL
RBC #/AREA URNS AUTO: >100 /HPF (ref 0–4)
SP GR UR STRIP: >=1.03 (ref 1–1.03)
SQUAMOUS #/AREA URNS AUTO: 0 /HPF
URN SPEC COLLECT METH UR: ABNORMAL
WBC #/AREA URNS AUTO: 6 /HPF (ref 0–5)

## 2024-08-22 PROCEDURE — 99999 PR PBB SHADOW E&M-EST. PATIENT-LVL IV: CPT | Mod: PBBFAC,,, | Performed by: INTERNAL MEDICINE

## 2024-08-22 PROCEDURE — 3008F BODY MASS INDEX DOCD: CPT | Mod: CPTII,S$GLB,, | Performed by: INTERNAL MEDICINE

## 2024-08-22 PROCEDURE — 81001 URINALYSIS AUTO W/SCOPE: CPT | Performed by: INTERNAL MEDICINE

## 2024-08-22 PROCEDURE — 3074F SYST BP LT 130 MM HG: CPT | Mod: CPTII,S$GLB,, | Performed by: INTERNAL MEDICINE

## 2024-08-22 PROCEDURE — 99215 OFFICE O/P EST HI 40 MIN: CPT | Mod: S$GLB,,, | Performed by: INTERNAL MEDICINE

## 2024-08-22 PROCEDURE — 3078F DIAST BP <80 MM HG: CPT | Mod: CPTII,S$GLB,, | Performed by: INTERNAL MEDICINE

## 2024-08-22 PROCEDURE — G2211 COMPLEX E/M VISIT ADD ON: HCPCS | Mod: S$GLB,,, | Performed by: INTERNAL MEDICINE

## 2024-08-22 PROCEDURE — 1159F MED LIST DOCD IN RCRD: CPT | Mod: CPTII,S$GLB,, | Performed by: INTERNAL MEDICINE

## 2024-08-22 NOTE — PROGRESS NOTES
-please schedule MRI pelvis and MRI CTL spine in next 1-2 weeks at [facility]   -cbc, cmp, serum free light chains, quantitative immunoglobulins, serum electropheresis, serum immunofixation lab (please link my orders not [facility]) and WB PET scan at [facility] [place] in 3 month  -MD appt 1 week after pet/labs

## 2024-08-22 NOTE — Clinical Note
-please schedule MRI pelvis and MRI CTL spine in next 1-2 weeks at [facility]  -cbc, cmp, serum free light chains, quantitative immunoglobulins, serum electropheresis, serum immunofixation lab (please link my orders not [facility]) and WB PET scan at [facility] [place] in 3 month -MD appt 1 week after pet/labs

## 2024-08-27 NOTE — PROGRESS NOTES
"Clinic Note  08/27/2024      Subjective:       Patient ID: Mary Blair is a 59 y.o. female being seen for solitary plasmacytoma, transfer of care from Dr. Brown.     Chief Complaint: No chief complaint on file.    HPI  Mary Blair is a 59 y.o. female with solitary plasmacytoma of lumbar spine, previously followed by Dr. Brown and treated with RT to L-spine at [facility] in 2018.     She states she has been doing well apart from her chronic joint pain. She works at [employer] and underwent UA a day ago which showed "many RBC". She has associated dysuria but denies fever. She denies any weight changes, night sweats, or B-symptoms.     Solitary Plasmacytoma History:  She initially presented with back pain in early February 2018.   - Bone scan (2/27/2018) revealed Increased uptake within L3 body corresponding to lytic lesion as seen on 2/16/18 CT.   - Back surgery on 2/28/2018 ([facility]), biopsy of L3 vertebral body lesion revealed lambda restricted plasma cell neoplasm.   - Bone marrow biopsy 3/8/2018 was negative for any malignancy. Skeletal survey 3/21/2018 revealed "Periprosthetic lucency associated with the left proximal femur potentially reflecting osteolysis rather than malignancy".   - She underwent RT to L3, L4 and L5 spine from March to May 8, 2018 for a total of 8 weeks due to suspicion for invasion of adjacent vertebrae.    - Surveillance with imaging (PET/CT and MRI) and Myeloma labs from 2019 - 2023 largely unrevealing.   - PET/CT 03/07/2024: New hypermetabolic lytic lesion in the R occipital condyle and small lytic foci in the R iliac bone   - BMBx 04/10/2024: Benign. Negative for myeloma or lymphoma   - Iliac bone biopsy 5/21/2024: suboptimal sample   - PET/CT 06/20/2024: minimal increased uptake in lesions from 03/07/2024 PET.     Past Medical History:   Diagnosis Date    Cancer     Plasmacytoma     Rotator cuff disorder     Spinal stenosis        Past Surgical History: "   Procedure Laterality Date    BIOPSY, WITH CT GUIDANCE Right 2024    Procedure: ILIAC BONE [id number];  Surgeon: Harjeet Bro MD;  Location: [facility] CATH LAB;  Service: Radiology;  Laterality: Right;    BONE MARROW BIOPSY Left 04/10/2024    Procedure: Biopsy-bone marrow;  Surgeon: Santa Brown MD;  Location: [facility] ENDO (4TH FLR);  Service: Oncology;  Laterality: Left;  -precall complete-MS     SECTION      corpal tunnel      HERNIA REPAIR      hiatal hernia repair    JOINT REPLACEMENT      left hip x2    ROTATOR CUFF REPAIR      SPINE SURGERY      lumbar    TONSILLECTOMY         Family History   Problem Relation Name Age of Onset    Heart disease Mother      Heart disease Father      No Known Problems Sister      No Known Problems Brother      No Known Problems Maternal Aunt      No Known Problems Maternal Uncle      No Known Problems Paternal Aunt      No Known Problems Paternal Uncle      Cataracts Maternal Grandmother      No Known Problems Maternal Grandfather      No Known Problems Paternal Grandmother      No Known Problems Paternal Grandfather      Amblyopia Neg Hx      Blindness Neg Hx      Cancer Neg Hx      Diabetes Neg Hx      Glaucoma Neg Hx      Hypertension Neg Hx      Macular degeneration Neg Hx      Retinal detachment Neg Hx      Strabismus Neg Hx      Stroke Neg Hx      Thyroid disease Neg Hx         Social History     Socioeconomic History    Marital status: Single   Tobacco Use    Smoking status: Never    Smokeless tobacco: Never   Substance and Sexual Activity    Alcohol use: Yes     Comment: occassionally    Drug use: No    Sexual activity: Yes     Partners: Male     Social Determinants of Health     Financial Resource Strain: Patient Declined (2024)    Received from [facility]    Overall Financial Resource Strain (CARDIA)     Difficulty of Paying Living Expenses: Patient declined   Food Insecurity: Patient Declined (2024)    Received from [facility]     Hunger Vital Sign     Worried About Running Out of Food in the Last Year: Patient declined     Ran Out of Food in the Last Year: Patient declined   Transportation Needs: No Transportation Needs (7/20/2024)    Received from [facility]    PRAPARE - Transportation     Lack of Transportation (Medical): No     Lack of Transportation (Non-Medical): No   Physical Activity: Insufficiently Active (7/20/2024)    Received from [facility]    Exercise Vital Sign     Days of Exercise per Week: 3 days     Minutes of Exercise per Session: 30 min   Stress: No Stress Concern Present (7/20/2024)    Received from [facility]    [employer] [place] [employer] - Occupational Stress Questionnaire     Feeling of Stress : Not at all   Housing Stability: Low Risk  (3/23/2024)    [facility] from [facility]    Housing Stability Vital Sign     Unable to Pay for Housing in the Last Year: No     Number of Places Lived in the Last Year: 1     In the last 12 months, was there a time when you did not have a steady place to sleep or slept in a shelter (including now)?: No       Patient's Medications   New Prescriptions    No medications on file   Previous Medications    AMITRIPTYLINE (ELAVIL) 10 MG TABLET    Take 1 tablet (10 mg total) by mouth every evening.    CELEBREX 100 MG CAPSULE    100 mg.    CYCLOBENZAPRINE (FLEXERIL) 5 MG TABLET    Take 1 tablet (5 mg total) by mouth 3 (three) times daily as needed for Muscle spasms.    DULOXETINE (CYMBALTA) 60 MG CAPSULE    Take 60 mg by mouth once daily.     ONDANSETRON (ZOFRAN-ODT) 8 MG TBDL    Take 8 mg by mouth 2 (two) times daily as needed.    SOLIFENACIN (VESICARE) 10 MG TABLET    Take 10 mg by mouth.    TIZANIDINE (ZANAFLEX) 4 MG TABLET    Take 4 mg by mouth 3 (three) times daily as needed.   Modified Medications    No medications on file   Discontinued Medications    No medications on file       Patient Active Problem List    Diagnosis Date Noted    Bone pain 02/27/2024     "Weight loss, abnormal 02/27/2024    Presbyopia 03/05/2021    Episodic tension-type headache, not intractable 09/01/2019    Hypokalemia 09/01/2019    Hypomagnesemia 09/01/2019    Gram negative sepsis     Pyelonephritis 08/30/2019    Plasma cell neoplasm 02/05/2019    Plasmacytoma 02/03/2019       Review of Systems   Constitutional: Negative.    HENT: Negative.     Eyes: Negative.    Respiratory: Negative.     Cardiovascular: Negative.    Gastrointestinal: Negative.    Genitourinary:  Positive for dysuria. Negative for hematuria.   Musculoskeletal:  Positive for joint pain and myalgias.   Skin: Negative.    Neurological: Negative.    Endo/Heme/Allergies: Negative.    Psychiatric/Behavioral: Negative.             Objective:      /70 (BP Location: Left arm, Patient Position: Sitting, BP Method: Medium (Automatic))   Pulse 72   Temp 98.7 °F (37.1 °C) (Oral)   Resp 18   Ht 5' 2" (1.575 m)   Wt 51.9 kg (114 lb 6.7 oz)   SpO2 98%   BMI 20.93 kg/m²   Body mass index is 20.93 kg/m².    Physical Exam  Constitutional:       Appearance: Normal appearance. She is normal weight.   HENT:      Head: Normocephalic and atraumatic.      Mouth/Throat:      Mouth: Mucous membranes are moist.      Pharynx: Oropharynx is clear.   Eyes:      Extraocular Movements: Extraocular movements intact.      Conjunctiva/sclera: Conjunctivae normal.      Pupils: Pupils are equal, round, and reactive to light.   Pulmonary:      Effort: Pulmonary effort is normal.   Abdominal:      Palpations: Abdomen is soft.   Musculoskeletal:         General: Normal range of motion.      Cervical back: Normal range of motion.   Skin:     General: Skin is warm.   Neurological:      General: No focal deficit present.      Mental Status: She is alert and oriented to person, place, and time.   Psychiatric:         Mood and Affect: Mood normal.         Behavior: Behavior normal.         Thought Content: Thought content normal.         Assessment:         1. " Plasmacytoma in relapse, unspecified plasmacytoma type  MRI Pelvis W WO Contrast    MRI Spine Cervical-Thoracic-Lumbar W W/O Contrast (XPD)    Creatinine, serum    Comprehensive Metabolic Panel    CBC Auto Differential    Protein Electrophoresis, Serum    Immunoglobulin Free LT Chains Blood    Immunoglobulins (IgG, IgA, IgM) Quantitative    Immunofixation Electrophoresis    Urinalysis, Reflex to Urine Culture Urine, Clean Catch    NM PET CT FDG Whole Body      2. Bone pain  MRI Pelvis W WO Contrast    MRI Spine Cervical-Thoracic-Lumbar W W/O Contrast (XPD)    Creatinine, serum    Comprehensive Metabolic Panel    CBC Auto Differential    Protein Electrophoresis, Serum    Immunoglobulin Free LT Chains Blood    Immunoglobulins (IgG, IgA, IgM) Quantitative    Immunofixation Electrophoresis    Urinalysis, Reflex to Urine Culture Urine, Clean Catch      3. Solitary plasmacytoma, unspecified whether remission achieved  MRI Pelvis W WO Contrast    MRI Spine Cervical-Thoracic-Lumbar W W/O Contrast (XPD)    Creatinine, serum    Comprehensive Metabolic Panel    CBC Auto Differential    Protein Electrophoresis, Serum    Immunoglobulin Free LT Chains Blood    Immunoglobulins (IgG, IgA, IgM) Quantitative    Immunofixation Electrophoresis    NM PET CT FDG Whole Body            Plan:         Mary Blair is a 59 y.o. female being seen for solitary plasmacytoma s/p RT to L-spine (03/2018 - 05/2018).       Solitary Plasmacytoma   She presetned with worsening chronic back pain, found to have plasmacytoma, underwent back surgery ([facility]) in 02/2018. Biopsy showed lambda-plasmacytoma. BMBx and labs have been negative. Underwent RT to L-spine in 03/2018. She has been on surveillance with labs and imaging since then. In March 2024, PET/CT showed new lesions which have been slowly progressing on PET/CT in June 2024. Her BMBx and Labs have been unrevealing. Her care is transferred from Dr. Brown in 08/2024.     - MRI Pelvis W WO  Contrast; Future  - MRI Spine Cervical-Thoracic-Lumbar W W/O Contrast (XPD); Future  - Comprehensive Metabolic Panel; Standing  - CBC Auto Differential; Standing  - Protein Electrophoresis, Serum; Standing  - Immunoglobulin Free LT Chains Blood; Standing  - Immunoglobulins (IgG, IgA, IgM) Quantitative; Standing  - Immunofixation Electrophoresis; Standing  - NM PET CT FDG Whole Body; Future    Dysuria   - Check UA and follow up with Urology and/or PCP    - She denies any vaginal bleeding or seeing reece hematuria         Patient seen and plan of care discussed with Dr. Freed.      Valente Boyd MD   Hematology and Medical Oncology Fellow   [facility]

## 2024-09-14 ENCOUNTER — LAB VISIT (OUTPATIENT)
Dept: LAB | Facility: HOSPITAL | Age: 59
End: 2024-09-14
Attending: INTERNAL MEDICINE
Payer: COMMERCIAL

## 2024-09-14 DIAGNOSIS — M89.8X9 BONE PAIN: ICD-10-CM

## 2024-09-14 DIAGNOSIS — C90.30 SOLITARY PLASMACYTOMA, UNSPECIFIED WHETHER REMISSION ACHIEVED: ICD-10-CM

## 2024-09-14 DIAGNOSIS — C90.32 PLASMACYTOMA IN RELAPSE, UNSPECIFIED PLASMACYTOMA TYPE: ICD-10-CM

## 2024-09-14 LAB
CREAT SERPL-MCNC: 0.9 MG/DL (ref 0.5–1.4)
EST. GFR  (NO RACE VARIABLE): >60 ML/MIN/1.73 M^2

## 2024-09-14 PROCEDURE — 82565 ASSAY OF CREATININE: CPT | Performed by: INTERNAL MEDICINE

## 2024-09-14 PROCEDURE — 36415 COLL VENOUS BLD VENIPUNCTURE: CPT | Performed by: INTERNAL MEDICINE

## 2024-09-25 ENCOUNTER — HOSPITAL ENCOUNTER (OUTPATIENT)
Dept: RADIOLOGY | Facility: OTHER | Age: 59
Discharge: HOME | End: 2024-09-25
Attending: INTERNAL MEDICINE
Payer: COMMERCIAL

## 2024-09-25 DIAGNOSIS — M89.8X9 BONE PAIN: ICD-10-CM

## 2024-09-25 DIAGNOSIS — C90.32 PLASMACYTOMA IN RELAPSE, UNSPECIFIED PLASMACYTOMA TYPE: ICD-10-CM

## 2024-09-25 DIAGNOSIS — C90.30 SOLITARY PLASMACYTOMA, UNSPECIFIED WHETHER REMISSION ACHIEVED: ICD-10-CM

## 2024-09-25 PROCEDURE — A9585 GADOBUTROL INJECTION: HCPCS | Performed by: INTERNAL MEDICINE

## 2024-09-25 PROCEDURE — 72197 MRI PELVIS W/O & W/DYE: CPT | Mod: 26,,, | Performed by: RADIOLOGY

## 2024-09-25 PROCEDURE — 72158 MRI LUMBAR SPINE W/O & W/DYE: CPT | Mod: 26,,, | Performed by: RADIOLOGY

## 2024-09-25 PROCEDURE — 72158 MRI LUMBAR SPINE W/O & W/DYE: CPT | Mod: TC

## 2024-09-25 PROCEDURE — 25500020 PHARM REV CODE 255: Performed by: INTERNAL MEDICINE

## 2024-09-25 PROCEDURE — 72156 MRI NECK SPINE W/O & W/DYE: CPT | Mod: 26,,, | Performed by: RADIOLOGY

## 2024-09-25 PROCEDURE — 72157 MRI CHEST SPINE W/O & W/DYE: CPT | Mod: 26,,, | Performed by: RADIOLOGY

## 2024-09-25 PROCEDURE — 72197 MRI PELVIS W/O & W/DYE: CPT | Mod: TC

## 2024-09-25 RX ORDER — GADOBUTROL 604.72 MG/ML
5 INJECTION INTRAVENOUS
Status: COMPLETED | OUTPATIENT
Start: 2024-09-25 | End: 2024-09-25

## 2024-09-25 RX ADMIN — GADOBUTROL 5 ML: 604.72 INJECTION INTRAVENOUS at 01:09

## 2024-11-22 ENCOUNTER — HOSPITAL ENCOUNTER (OUTPATIENT)
Dept: RADIOLOGY | Facility: HOSPITAL | Age: 59
Discharge: HOME | End: 2024-11-22
Attending: INTERNAL MEDICINE
Payer: COMMERCIAL

## 2024-11-22 DIAGNOSIS — C90.30 SOLITARY PLASMACYTOMA, UNSPECIFIED WHETHER REMISSION ACHIEVED: ICD-10-CM

## 2024-11-22 DIAGNOSIS — C90.32 PLASMACYTOMA IN RELAPSE, UNSPECIFIED PLASMACYTOMA TYPE: ICD-10-CM

## 2024-11-22 LAB — POCT GLUCOSE: 90 MG/DL (ref 70–110)

## 2024-11-22 PROCEDURE — 78816 PET IMAGE W/CT FULL BODY: CPT | Mod: 26,PS,, | Performed by: NUCLEAR MEDICINE

## 2024-11-22 PROCEDURE — 78816 PET IMAGE W/CT FULL BODY: CPT | Mod: TC

## 2024-11-22 PROCEDURE — A9552 F18 FDG: HCPCS | Performed by: INTERNAL MEDICINE

## 2024-11-22 RX ORDER — FLUDEOXYGLUCOSE F18 500 MCI/ML
12 INJECTION INTRAVENOUS
Status: COMPLETED | OUTPATIENT
Start: 2024-11-22 | End: 2024-11-22

## 2024-11-22 RX ADMIN — FLUDEOXYGLUCOSE F-18 13.37 MILLICURIE: 500 INJECTION INTRAVENOUS at 09:11

## 2024-11-25 ENCOUNTER — OFFICE VISIT (OUTPATIENT)
Dept: HEMATOLOGY/ONCOLOGY | Facility: CLINIC | Age: 59
End: 2024-11-25
Payer: COMMERCIAL

## 2024-11-25 VITALS
TEMPERATURE: 99 F | OXYGEN SATURATION: 98 % | RESPIRATION RATE: 20 BRPM | HEIGHT: 62 IN | SYSTOLIC BLOOD PRESSURE: 103 MMHG | DIASTOLIC BLOOD PRESSURE: 54 MMHG | HEART RATE: 75 BPM | WEIGHT: 115.31 LBS | BODY MASS INDEX: 21.22 KG/M2

## 2024-11-25 DIAGNOSIS — D49.89 PLASMA CELL NEOPLASM: Primary | ICD-10-CM

## 2024-11-25 PROCEDURE — 3008F BODY MASS INDEX DOCD: CPT | Mod: CPTII,S$GLB,, | Performed by: INTERNAL MEDICINE

## 2024-11-25 PROCEDURE — 99214 OFFICE O/P EST MOD 30 MIN: CPT | Mod: S$GLB,,, | Performed by: INTERNAL MEDICINE

## 2024-11-25 PROCEDURE — 3074F SYST BP LT 130 MM HG: CPT | Mod: CPTII,S$GLB,, | Performed by: INTERNAL MEDICINE

## 2024-11-25 PROCEDURE — 3078F DIAST BP <80 MM HG: CPT | Mod: CPTII,S$GLB,, | Performed by: INTERNAL MEDICINE

## 2024-11-25 PROCEDURE — 99999 PR PBB SHADOW E&M-EST. PATIENT-LVL III: CPT | Mod: PBBFAC,,, | Performed by: INTERNAL MEDICINE

## 2024-11-25 PROCEDURE — G2211 COMPLEX E/M VISIT ADD ON: HCPCS | Mod: S$GLB,,, | Performed by: INTERNAL MEDICINE

## 2024-11-25 NOTE — PROGRESS NOTES
"Clinic Note  12/05/2024      Subjective:       Patient ID: Mary Blair is a 59 y.o. female being seen for solitary plasmacytoma, transfer of care from Dr. Brown.     Chief Complaint: No chief complaint on file.    HPI  Mary Blair is a 59 y.o. female with solitary plasmacytoma of lumbar spine, previously followed by Dr. Brown and treated with RT to L-spine at [facility] in 2018.         Solitary Plasmacytoma History:  She initially presented with back pain in early February 2018.   - Bone scan (2/27/2018) revealed Increased uptake within L3 body corresponding to lytic lesion as seen on 2/16/18 CT.   - Back surgery on 2/28/2018 ([facility]), biopsy of L3 vertebral body lesion revealed lambda restricted plasma cell neoplasm.   - Bone marrow biopsy 3/8/2018 was negative for any malignancy. Skeletal survey 3/21/2018 revealed "Periprosthetic lucency associated with the left proximal femur potentially reflecting osteolysis rather than malignancy".   - She underwent RT to L3, L4 and L5 spine from March to May 8, 2018 for a total of 8 weeks due to suspicion for invasion of adjacent vertebrae.    - Surveillance with imaging (PET/CT and MRI) and Myeloma labs from 2019 - 2023 largely unrevealing.   - PET/CT 03/07/2024: New hypermetabolic lytic lesion in the R occipital condyle and small lytic foci in the R iliac bone   - BMBx 04/10/2024: Benign. Negative for myeloma or lymphoma   - Iliac bone biopsy 5/21/2024: suboptimal sample   - PET/CT 06/20/2024: minimal increased uptake in lesions from 03/07/2024 PET.       Interval history -  11/25/24  Presents for surveillance visit and to review results of recent WB pet scan.   Her chronic pain continues. No new focal pain even at sights of abnormalities on pet.     Past Medical History:   Diagnosis Date    Cancer     Plasmacytoma     Rotator cuff disorder     Spinal stenosis        Past Surgical History:   Procedure Laterality Date    BIOPSY, WITH CT GUIDANCE Right " 2024    Procedure: ILIAC BONE [id number];  Surgeon: Harjeet Bro MD;  Location: [facility] CATH LAB;  Service: Radiology;  Laterality: Right;    BONE MARROW BIOPSY Left 04/10/2024    Procedure: Biopsy-bone marrow;  Surgeon: Santa Brown MD;  Location: [facility] ENDO (4TH FLR);  Service: Oncology;  Laterality: Left;  -precall complete-MS     SECTION      corpal tunnel      HERNIA REPAIR      hiatal hernia repair    JOINT REPLACEMENT      left hip x2    ROTATOR CUFF REPAIR      SPINE SURGERY      lumbar    TONSILLECTOMY         Family History   Problem Relation Name Age of Onset    Heart disease Mother      Heart disease Father      No Known Problems Sister      No Known Problems Brother      No Known Problems Maternal Aunt      No Known Problems Maternal Uncle      No Known Problems Paternal Aunt      No Known Problems Paternal Uncle      Cataracts Maternal Grandmother      No Known Problems Maternal Grandfather      No Known Problems Paternal Grandmother      No Known Problems Paternal Grandfather      Amblyopia Neg Hx      Blindness Neg Hx      Cancer Neg Hx      Diabetes Neg Hx      Glaucoma Neg Hx      Hypertension Neg Hx      Macular degeneration Neg Hx      Retinal detachment Neg Hx      Strabismus Neg Hx      Stroke Neg Hx      Thyroid disease Neg Hx         Social History     Socioeconomic History    Marital status: Single   Tobacco Use    Smoking status: Never    Smokeless tobacco: Never   Substance and Sexual Activity    Alcohol use: Yes     Comment: occassionally    Drug use: No    Sexual activity: Yes     Partners: Male     Social Drivers of Health     Financial Resource Strain: Patient Declined (2024)    Received from [facility]    Overall Financial Resource Strain (CARDIA)     Difficulty of Paying Living Expenses: Patient declined   Food Insecurity: Patient Declined (2024)    Received from [facility]    Hunger Vital Sign     Worried About Running Out of Food in the  Last Year: Patient declined     Ran Out of Food in the Last Year: Patient declined   Transportation Needs: No Transportation Needs (7/20/2024)    Received from [facility]    PRAPARE - Transportation     Lack of Transportation (Medical): No     Lack of Transportation (Non-Medical): No   Physical Activity: Insufficiently Active (7/20/2024)    Received from [facility]    Exercise Vital Sign     Days of Exercise per Week: 3 days     Minutes of Exercise per Session: 30 min   Stress: No Stress Concern Present (7/20/2024)    Received from [facility]    [employer] [place] [employer] - Occupational Stress Questionnaire     Feeling of Stress : Not at all   Housing Stability: Low Risk  (3/23/2024)    Received from [facility], [facility]    Housing Stability Vital Sign     Unable to Pay for Housing in the Last Year: No     Number of Places Lived in the Last Year: 1     In the last 12 months, was there a time when you did not have a steady place to sleep or slept in a shelter (including now)?: No       Patient's Medications   New Prescriptions    No medications on file   Previous Medications    AMITRIPTYLINE (ELAVIL) 10 MG TABLET    Take 1 tablet (10 mg total) by mouth every evening.    CELEBREX 100 MG CAPSULE    100 mg.    CYCLOBENZAPRINE (FLEXERIL) 5 MG TABLET    Take 1 tablet (5 mg total) by mouth 3 (three) times daily as needed for Muscle spasms.    DULOXETINE (CYMBALTA) 60 MG CAPSULE    Take 60 mg by mouth once daily.     ONDANSETRON (ZOFRAN-ODT) 8 MG TBDL    Take 8 mg by mouth 2 (two) times daily as needed.    SOLIFENACIN (VESICARE) 10 MG TABLET    Take 10 mg by mouth.    TIZANIDINE (ZANAFLEX) 4 MG TABLET    Take 4 mg by mouth 3 (three) times daily as needed.   Modified Medications    No medications on file   Discontinued Medications    No medications on file       Patient Active Problem List    Diagnosis Date Noted    Bone pain 02/27/2024    Weight loss, abnormal 02/27/2024    Presbyopia 03/05/2021     "Episodic tension-type headache, not intractable 09/01/2019    Hypokalemia 09/01/2019    Hypomagnesemia 09/01/2019    Gram negative sepsis     Pyelonephritis 08/30/2019    Plasma cell neoplasm 02/05/2019    Plasmacytoma 02/03/2019      See HPI           Objective:      BP (!) 103/54 (BP Location: Left arm, Patient Position: Sitting)   Pulse 75   Temp 98.8 °F (37.1 °C) (Oral)   Resp 20   Ht 5' 2" (1.575 m)   Wt 52.3 kg (115 lb 4.8 oz)   SpO2 98%   BMI 21.09 kg/m²   Body mass index is 21.09 kg/m².    Physical Exam  Constitutional:       Appearance: Normal appearance. She is normal weight.   HENT:      Head: Normocephalic and atraumatic.      Mouth/Throat:      Mouth: Mucous membranes are moist.      Pharynx: Oropharynx is clear.   Eyes:      Extraocular Movements: Extraocular movements intact.      Conjunctiva/sclera: Conjunctivae normal.      Pupils: Pupils are equal, round, and reactive to light.   Pulmonary:      Effort: Pulmonary effort is normal.   Abdominal:      Palpations: Abdomen is soft.   Musculoskeletal:         General: Normal range of motion.      Cervical back: Normal range of motion.   Skin:     General: Skin is warm.   Neurological:      General: No focal deficit present.      Mental Status: She is alert and oriented to person, place, and time.   Psychiatric:         Mood and Affect: Mood normal.         Behavior: Behavior normal.         Thought Content: Thought content normal.         Lab Results   Component Value Date    WBC 3.86 (L) 11/22/2024    HGB 12.5 11/22/2024    HCT 37.9 11/22/2024    MCV 94 11/22/2024     11/22/2024       Gran # (ANC)   Date Value Ref Range Status   11/22/2024 2.0 1.8 - 7.7 K/uL Final     Gran %   Date Value Ref Range Status   11/22/2024 52.5 38.0 - 73.0 % Final     CMP  Sodium   Date Value Ref Range Status   11/22/2024 140 136 - 145 mmol/L Final     Potassium   Date Value Ref Range Status   11/22/2024 4.0 3.5 - 5.1 mmol/L Final     Chloride   Date Value Ref " Range Status   11/22/2024 106 95 - 110 mmol/L Final     CO2   Date Value Ref Range Status   11/22/2024 26 23 - 29 mmol/L Final     Glucose   Date Value Ref Range Status   11/22/2024 91 70 - 110 mg/dL Final     BUN   Date Value Ref Range Status   11/22/2024 14 8 - 23 mg/dL Final     Creatinine   Date Value Ref Range Status   11/22/2024 0.9 0.5 - 1.4 mg/dL Final     Calcium   Date Value Ref Range Status   11/22/2024 9.1 8.7 - 10.5 mg/dL Final     Total Protein   Date Value Ref Range Status   11/22/2024 6.6 6.0 - 8.4 g/dL Final     Albumin   Date Value Ref Range Status   11/22/2024 4.0 3.5 - 5.2 g/dL Final     Total Bilirubin   Date Value Ref Range Status   11/22/2024 0.5 0.1 - 1.0 mg/dL Final     Comment:     For infants and newborns, interpretation of results should be based  on gestational age, weight and in agreement with clinical  observations.    Premature Infant recommended reference ranges:  Up to 24 hours.............<8.0 mg/dL  Up to 48 hours............<12.0 mg/dL  3-5 days..................<15.0 mg/dL  6-29 days.................<15.0 mg/dL       Alkaline Phosphatase   Date Value Ref Range Status   11/22/2024 50 40 - 150 U/L Final     AST   Date Value Ref Range Status   11/22/2024 15 10 - 40 U/L Final     ALT   Date Value Ref Range Status   11/22/2024 9 (L) 10 - 44 U/L Final     Anion Gap   Date Value Ref Range Status   11/22/2024 8 8 - 16 mmol/L Final     eGFR   Date Value Ref Range Status   11/22/2024 >60.0 >60 mL/min/1.73 m^2 Final     IgG   Date Value Ref Range Status   11/22/2024 776 650 - 1600 mg/dL Final     Comment:     IgG Cord Blood Reference Range: 650-1600 mg/dL.     IgA   Date Value Ref Range Status   11/22/2024 90 40 - 350 mg/dL Final     Comment:     IgA Cord Blood Reference Range: <5 mg/dL.     IgM   Date Value Ref Range Status   11/22/2024 105 50 - 300 mg/dL Final     Comment:     IgM Cord Blood Reference Range: <25 mg/dL.     Kappa Free Light Chains   Date Value Ref Range Status    11/22/2024 1.04 0.33 - 1.94 mg/dL Final   02/02/2024 1.38 0.33 - 1.94 mg/dL Final   08/03/2023 1.49 0.33 - 1.94 mg/dL Final     Lambda Free Light Chains   Date Value Ref Range Status   11/22/2024 2.89 (H) 0.57 - 2.63 mg/dL Final   02/02/2024 2.96 (H) 0.57 - 2.63 mg/dL Final   08/03/2023 2.98 (H) 0.57 - 2.63 mg/dL Final     Kappa/Lambda FLC Ratio   Date Value Ref Range Status   11/22/2024 0.36 0.26 - 1.65 Final     Comment:     Undetected antigen excess is a rare event but cannot   be excluded. If these free light chain results do not   agree with other clinical or laboratory findings or   if the sample is from a patient that has previously   demonstrated antigen excess, discuss with the testing   laboratory.   Results should always be interpreted in conjunction   with other laboratory tests and clinical evidence.     02/02/2024 0.47 0.26 - 1.65 Final     Comment:     Undetected antigen excess is a rare event but cannot   be excluded. If these free light chain results do not   agree with other clinical or laboratory findings or   if the sample is from a patient that has previously   demonstrated antigen excess, discuss with the testing   laboratory.   Results should always be interpreted in conjunction   with other laboratory tests and clinical evidence.     08/03/2023 0.50 0.26 - 1.65 Final     Comment:     Undetected antigen excess is a rare event but cannot   be excluded. If these free light chain results do not   agree with other clinical or laboratory findings or   if the sample is from a patient that has previously   demonstrated antigen excess, discuss with the testing   laboratory.   Results should always be interpreted in conjunction   with other laboratory tests and clinical evidence.       Pathologist Interpretation SPE   Date Value Ref Range Status   11/22/2024 REVIEWED  Final     Comment:       Electronically reviewed and signed by:  Janine Villegas MD  Signed on 11/26/24 at 10:34  Normal total  protein, normal pattern.     04/06/2022 REVIEWED  Final     Comment:       Electronically reviewed and signed by:  Silvio Alamo M.D.  Signed on 04/07/22 at 15:30  Normal total protein, normal pattern.      09/15/2020 REVIEWED  Final     Comment:     Electronically reviewed and signed by:  Erum Ovalles D.O.  Signed on 09/16/20 at 21:51  Normal total protein, normal pattern. No discrete paraprotein peaks   identified for quantification.  Interpreted by Erum Ovalles D.O.       Pathologist Interpretation MIRNA   Date Value Ref Range Status   11/22/2024 REVIEWED  Final     Comment:       Electronically reviewed and signed by:  Janine Villegas MD  Signed on 11/26/24 at 10:34  No monoclonal peaks identified.        Results for orders placed or performed during the hospital encounter of 11/22/24 (from the past 2160 hours)   NM PET CT FDG Whole Body    Impression    New subcentimeter lytic lesion disrupting the cortex of the left ilium with mildly increased tracer uptake suspicious for new focus of disease.    Otherwise, stable distribution of disease with index lesions demonstrating mild increased tracer uptake compared to prior.    Electronically signed by resident: Nicolle Forte  Date:    11/22/2024  Time:    11:51    Electronically signed by: Chelsey Townsend  Date:    11/22/2024  Time:    13:35     Results for orders placed or performed during the hospital encounter of 09/25/24 (from the past 2160 hours)   MRI Spine Cervical-Thoracic-Lumbar W W/O Contrast (XPD)    Impression    Overall, stable post treatment appearance of the spine compared to prior, including vertebral augmentation and radiation for an L3 plasmacytoma.  No new lesion.    Stable focal syrinx T7-8, nonspecific.    Multilevel degenerative change.  Severe left neural foraminal narrowing at C5-6 and L5-S1.  Moderate canal narrowing L4-5.      Electronically signed by: Nehal Quiñones  Date:    09/25/2024  Time:    14:37   Results for orders placed or  performed during the hospital encounter of 09/25/24 (from the past 2160 hours)   MRI Pelvis W WO Contrast    Impression    Stable subcentimeter lesions in the pelvis and proximal right femur, nonspecific.  No new lesions.      Electronically signed by: Nehal Quiñones  Date:    09/25/2024  Time:    14:44         Assessment:         1. Multiple myeloma, remission status unspecified  NM PET CT FDG Whole Body              Plan:         Mary Blair is a 59 y.o. female being seen for solitary plasmacytoma s/p RT to L-spine (03/2018 - 05/2018).       Solitary Plasmacytoma   She presetned with worsening chronic back pain, found to have plasmacytoma, [facility]. Biopsy showed lambda-plasmacytoma. BMBx and labs have been negative. Underwent RT to L-spine in 03/2018. She has been on surveillance with labs and imaging since then. In March 2024, PET/CT showed new lesions which have been slowly progressing on PET/CT in June 2024. Her BMBx and Labs have been unrevealing. Her care is transferred from Dr. Brown in 08/2024 then to Dr. Freed    -I have personally reviewed all images from pt over last year and although these lesions are described as lytic on nov 2024 pet they are very hard to appreciate and have been generally stable and asymptomatic; also they were not noted on sept 2024 mri  -I suspect these maybe non myelomatous lesions; will plan on repeating WB PET Scan in 3 months to track these and may need to consider biopsy and or therapy if radiographic progression    -her bone marrow biopsy and serial serum studies and bone marrow biopsy are unrevealing          FU:   -cbc, cmp, serum free light chains, quantitative immunoglobulins, serum electropheresis, serum immunofixation lab and wb PET scan in 3 months  -MD appt  with Dr. Appiah 3-4 days after labs/pet

## 2024-11-25 NOTE — Clinical Note
-cbc, cmp, serum free light chains, quantitative immunoglobulins, serum electropheresis, serum immunofixation lab and wb PET scan in 3 months -MD appt 3-4 days after labs/pet

## 2025-03-21 ENCOUNTER — HOSPITAL ENCOUNTER (EMERGENCY)
Facility: HOSPITAL | Age: 60
Discharge: HOME | End: 2025-03-21
Attending: EMERGENCY MEDICINE
Payer: COMMERCIAL

## 2025-03-21 VITALS
OXYGEN SATURATION: 99 % | HEART RATE: 59 BPM | SYSTOLIC BLOOD PRESSURE: 143 MMHG | DIASTOLIC BLOOD PRESSURE: 69 MMHG | HEIGHT: 62 IN | TEMPERATURE: 99 F | BODY MASS INDEX: 20.61 KG/M2 | RESPIRATION RATE: 17 BRPM | WEIGHT: 112 LBS

## 2025-03-21 DIAGNOSIS — M25.512 LEFT SHOULDER PAIN: ICD-10-CM

## 2025-03-21 PROCEDURE — 25000003 PHARM REV CODE 250: Mod: ER

## 2025-03-21 PROCEDURE — 99284 EMERGENCY DEPT VISIT MOD MDM: CPT | Mod: 25,ER

## 2025-03-21 RX ORDER — OXYCODONE AND ACETAMINOPHEN 5; 325 MG/1; MG/1
1 TABLET ORAL EVERY 6 HOURS PRN
Qty: 12 TABLET | Refills: 0 | Status: SHIPPED | OUTPATIENT
Start: 2025-03-21

## 2025-03-21 RX ORDER — ACETAMINOPHEN 500 MG
500 TABLET ORAL EVERY 6 HOURS PRN
Qty: 20 TABLET | Refills: 0 | Status: SHIPPED | OUTPATIENT
Start: 2025-03-21

## 2025-03-21 RX ORDER — LIDOCAINE 50 MG/G
1 PATCH TOPICAL
Status: DISCONTINUED | OUTPATIENT
Start: 2025-03-21 | End: 2025-03-21 | Stop reason: HOSPADM

## 2025-03-21 RX ORDER — LIDOCAINE 50 MG/G
1 PATCH TOPICAL DAILY
Qty: 15 PATCH | Refills: 0 | Status: SHIPPED | OUTPATIENT
Start: 2025-03-21

## 2025-03-21 RX ORDER — OXYCODONE AND ACETAMINOPHEN 5; 325 MG/1; MG/1
1 TABLET ORAL
Refills: 0 | Status: COMPLETED | OUTPATIENT
Start: 2025-03-21 | End: 2025-03-21

## 2025-03-21 RX ORDER — ONDANSETRON 4 MG/1
4 TABLET, ORALLY DISINTEGRATING ORAL
Status: COMPLETED | OUTPATIENT
Start: 2025-03-21 | End: 2025-03-21

## 2025-03-21 RX ORDER — METHOCARBAMOL 500 MG/1
500 TABLET, FILM COATED ORAL 4 TIMES DAILY
Qty: 20 TABLET | Refills: 0 | Status: SHIPPED | OUTPATIENT
Start: 2025-03-21 | End: 2025-03-26

## 2025-03-21 RX ADMIN — ONDANSETRON 4 MG: 4 TABLET, ORALLY DISINTEGRATING ORAL at 06:03

## 2025-03-21 RX ADMIN — OXYCODONE HYDROCHLORIDE AND ACETAMINOPHEN 1 TABLET: 5; 325 TABLET ORAL at 06:03

## 2025-03-21 RX ADMIN — LIDOCAINE 1 PATCH: 50 PATCH TOPICAL at 06:03

## 2025-03-21 NOTE — DISCHARGE INSTRUCTIONS
You were seen in the emergency department today for shoulder pain.  Your x-ray was normal.  You may use sling the next few days for comfort measures, but no longer than 3 days.  Take Percocets as needed for breakthrough pain.  Please take all medications as prescribed and as we discussed.  Follow-up with specialist if instructed to do so.  It is important to remember that some problems are difficult to diagnose and may not be found during your Emergency Department visit. Be sure to follow up with your primary care doctor and review all labs/imaging/tests that were performed during this visit with them. Some labs/tests may be outside of the normal range and require non-emergent follow-up and further investigation to help diagnose/exclude/prevent complications or other medical conditions. Return to the emergency department for any new or worsening symptoms. Thank you for allowing me to care for you today, it was my pleasure. I hope you get to feeling better soon :)

## 2025-03-21 NOTE — Clinical Note
"Mary Vizcainojosefina Blair was seen and treated in our emergency department on 3/21/2025.  She may return to work on 03/24/2025.       If you have any questions or concerns, please don't hesitate to call.      Ap Marie PA-C"

## 2025-03-21 NOTE — ED PROVIDER NOTES
Encounter Date: 3/21/2025    SCRIBE #1 NOTE: I, Nereida Swann, am scribing for, and in the presence of,  Ap Marie PA-C. I have scribed the following portions of the note - Other sections scribed: HPI, ROS, PE.       History     Chief Complaint   Patient presents with    Shoulder Pain     Patient presents w/ a c/o of left shoulder pain s/t trip and fall from a misstep on a curb. Denies any loc or head injury. Decrease ROM to the left shoulder.     Patient is a 59 y.o. female with a past medical history of plasmacytoma of lumbar spine and spinal stenosis who presents to the Emergency Department for evaluation of left shoulder pain following a trip and fall today. Reports she was stepping up on a curb when she fell and hit her left shoulder on the grassy ground. Denies head trauma or loss of consciousness. Patient's daughter at bedside reports patient has associated symptoms of numbness and tingling to fingers. She has not taken any medications for the symptoms. Reports taking Cymbalta 60 mg daily for her chronic pain and Vesicare 10 mg. Denies anticoagulant use. Denies history of diabetes or hypertension. Denies nausea or vomiting. No known drug allergies.     The history is provided by the patient and a relative. No  was used.     Review of patient's allergies indicates:  No Known Allergies  Past Medical History:   Diagnosis Date    Cancer     Plasmacytoma     Rotator cuff disorder     Spinal stenosis      Past Surgical History:   Procedure Laterality Date    BIOPSY, WITH CT GUIDANCE Right 2024    Procedure: ILIAC BONE [id number];  Surgeon: Harjeet Bro MD;  Location: [facility] CATH LAB;  Service: Radiology;  Laterality: Right;    BONE MARROW BIOPSY Left 04/10/2024    Procedure: Biopsy-bone marrow;  Surgeon: Santa Brown MD;  Location: [facility]O ([facility]);  Service: Oncology;  Laterality: Left;  -precall complete-MS     SECTION      corpal tunnel      HERNIA  REPAIR  2023    hiatal hernia repair    JOINT REPLACEMENT      left hip x2    ROTATOR CUFF REPAIR      SPINE SURGERY      lumbar    TONSILLECTOMY       Family History   Problem Relation Name Age of Onset    Heart disease Mother      Heart disease Father      No Known Problems Sister      No Known Problems Brother      No Known Problems Maternal Aunt      No Known Problems Maternal Uncle      No Known Problems Paternal Aunt      No Known Problems Paternal Uncle      Cataracts Maternal Grandmother      No Known Problems Maternal Grandfather      No Known Problems Paternal Grandmother      No Known Problems Paternal Grandfather      Amblyopia Neg Hx      Blindness Neg Hx      Cancer Neg Hx      Diabetes Neg Hx      Glaucoma Neg Hx      Hypertension Neg Hx      Macular degeneration Neg Hx      Retinal detachment Neg Hx      Strabismus Neg Hx      Stroke Neg Hx      Thyroid disease Neg Hx       Social History[1]  Review of Systems   Constitutional:  Negative for chills and fever.   Gastrointestinal:  Negative for nausea and vomiting.   Musculoskeletal:  Positive for arthralgias (left shoulder). Negative for joint swelling, neck pain and neck stiffness.   Neurological:  Positive for numbness (+ tingling to fingers). Negative for syncope and weakness.       Physical Exam     Initial Vitals [03/21/25 1748]   BP Pulse Resp Temp SpO2   126/82 66 20 98.5 °F (36.9 °C) 98 %      MAP       --         Physical Exam    Nursing note and vitals reviewed.  Constitutional: She appears well-developed and well-nourished.   HENT:   Head: Normocephalic and atraumatic.   Right Ear: External ear normal.   Left Ear: External ear normal.   Neck: Carotid bruit is not present.   Normal range of motion.  Cardiovascular:  Normal rate, regular rhythm, normal heart sounds and intact distal pulses.     Exam reveals no gallop and no friction rub.       No murmur heard.  Pulses:       Radial pulses are 2+ on the right side and 2+ on the left side.    Pulmonary/Chest: Breath sounds normal. No respiratory distress. She has no wheezes. She has no rhonchi. She has no rales.   Abdominal: Abdomen is soft. Bowel sounds are normal. She exhibits no distension. There is no abdominal tenderness. There is no rebound and no guarding.   Musculoskeletal:         General: Normal range of motion.      Cervical back: Normal range of motion.      Comments: There is limited range of motion of left shoulder secondary to pain.  Pain worse with passive extension of left shoulder.  There is normal strength and sensation.  2+ radial pulse.  Neurovascularly intact.     Neurological: She is alert and oriented to person, place, and time. She has normal strength. No sensory deficit. GCS score is 15. GCS eye subscore is 4. GCS verbal subscore is 5. GCS motor subscore is 6.   Psychiatric: She has a normal mood and affect.         ED Course   Procedures  Labs Reviewed - No data to display       Imaging Results              X-Ray Shoulder Trauma Left (Final result)  Result time 03/21/25 18:24:59      Final result by Giovanni Spencer MD (03/21/25 18:24:59)                   Impression:      No acute osseous abnormality identified.      Electronically signed by: Giovanni Spencer MD  Date:    03/21/2025  Time:    18:24               Narrative:    EXAMINATION:  XR SHOULDER TRAUMA 3 VIEW LEFT    CLINICAL HISTORY:  Pain in left shoulder    TECHNIQUE:  Three views of the left shoulder were performed.    COMPARISON  None    FINDINGS:  No evidence of acute displaced fracture, dislocation, or osseous destructive process.  Minor degenerative changes are seen at the glenohumeral joint.                                       Medications   LIDOcaine 5 % patch 1 patch (1 patch Transdermal Patch Applied 3/21/25 1826)   oxyCODONE-acetaminophen 5-325 mg per tablet 1 tablet (1 tablet Oral Given 3/21/25 1826)   ondansetron disintegrating tablet 4 mg (4 mg Oral Given 3/21/25 1826)     Medical Decision Making  This  is an emergent evaluation of a 59-year-old female who presents to the emergency department for evaluation of left shoulder pain status post mechanical trip and fall.    Physical exam as above.    Differential diagnosis includes but is not limited to fracture, dislocation, sprain, ligamentous injury.  Considered septic joint but highly doubtful given no joint erythema, swelling, mild range of motion, and no systemic symptoms. Considered but doubt compartment syndrome given soft compartments, 2+ pulses, normal color/temperature of affected extremity, and no pain out of proportion.      Workup initiated with x-ray.  Ordered 1 Percocet, Zofran, Lidoderm patch.  Vital signs, chart, labs, and/or imaging were all reviewed.  See ED course below and interpretations above. My overall impression is shoulder pain.  Patient given sling for comfort measures.  Active range of motion greatly improved after management of pain.  Low concern for rotator cuff tear at this time. Will discharge home with Robaxin, Lidoderm patches, short course of pain medication for breakthrough pain. Vital signs are reassuring. Patient/Caregiver is stable for discharge at this time.  Patient/Caregiver was informed of results, plan of care, and are comfortable with this.  All questions and concerns were addressed. Discussed strict return precautions with the patient/caregiver. Instructed follow up with primary care provider within 1 week.      Ap Marie PA-C    DISCLAIMER: This note was prepared with [employer] voice recognition transcription software. Garbled syntax, mangled pronouns, and other bizarre constructions may be attributed to that software system.       Amount and/or Complexity of Data Reviewed  Independent Historian: caregiver     Details: See HPI.  Radiology: ordered. Decision-making details documented in ED Course.    Risk  OTC drugs.  Prescription drug management.            Scribe Attestation:   Scribe #1: I performed the above scribed  service and the documentation accurately describes the services I performed. I attest to the accuracy of the note.        ED Course as of 03/21/25 1916   Fri Mar 21, 2025   1800 BP: 126/82 [TM]   1800 Temp: 98.5 °F (36.9 °C) [TM]   1800 Pulse: 66 [TM]   1800 Resp: 20 [TM]   1800 SpO2: 98 % [TM]   1836 X-Ray Shoulder Trauma Left  FINDINGS:  No evidence of acute displaced fracture, dislocation, or osseous destructive process.  Minor degenerative changes are seen at the glenohumeral joint.     Impression:     No acute osseous abnormality identified.   [TM]   1851 Informed patient of results.  Will place in sling as needed for pain.  Have instructed patient to not use for more than a day or 2.  Will discharge home with Robaxin, Lidoderm patches.  Will prescribe short course of Percocets for breakthrough pain. [TM]      ED Course User Index  [TM] Ap Marie PA-C                         I, Ap Marie PA-C, personally performed the services described in this documentation. All medical record entries made by the scribe were at my direction and in my presence. I have reviewed the chart and agree that the record reflects my personal performance and is accurate and complete.      DISCLAIMER: This note was prepared with [employer] voice recognition transcription software. Garbled syntax, mangled pronouns, and other bizarre constructions may be attributed to that software system.    Clinical Impression:  Final diagnoses:  [M25.512] Left shoulder pain          ED Disposition Condition    Discharge Stable          ED Prescriptions       Medication Sig Dispense Start Date End Date Auth. Provider    acetaminophen (TYLENOL) 500 MG tablet Take 1 tablet (500 mg total) by mouth every 6 (six) hours as needed. 20 tablet 3/21/2025 -- Ap Marie PA-C    methocarbamoL (ROBAXIN) 500 MG Tab Take 1 tablet (500 mg total) by mouth 4 (four) times daily. for 5 days 20 tablet 3/21/2025 3/26/2025 Ap Marie PA-C    LIDOcaine  (LIDODERM) 5 % Place 1 patch onto the skin once daily. Remove & Discard patch within 12 hours or as directed by MD 15 patch 3/21/2025 -- Ap Marie PA-C    oxyCODONE-acetaminophen (PERCOCET) 5-325 mg per tablet Take 1 tablet by mouth every 6 (six) hours as needed for Pain. 12 tablet 3/21/2025 -- Ap Marie PA-C          Follow-up Information       Follow up With Specialties Details Why Contact Info    [place] [facility] ED Emergency Medicine Go to  As needed, If symptoms worsen, or new symptoms develop [address]  [facility] [address] [phone]    Primary care doctor  Schedule an appointment as soon as possible for a visit in 3 days                   [1]   Social History  Tobacco Use    Smoking status: Never    Smokeless tobacco: Never   Substance Use Topics    Alcohol use: Yes     Comment: occassionally    Drug use: No        Ap Marie PA-C  03/21/25 3995

## 2025-04-01 DIAGNOSIS — M75.121 COMPLETE ROTATOR CUFF TEAR OR RUPTURE OF RIGHT SHOULDER, NOT SPECIFIED AS TRAUMATIC: Primary | ICD-10-CM

## 2025-04-01 DIAGNOSIS — Z96.619 PRESENCE OF UNSPECIFIED ARTIFICIAL SHOULDER JOINT: ICD-10-CM

## 2025-04-01 DIAGNOSIS — M75.122 COMPLETE ROTATOR CUFF TEAR OR RUPTURE OF LEFT SHOULDER, NOT SPECIFIED AS TRAUMATIC: Primary | ICD-10-CM

## 2025-04-12 ENCOUNTER — HOSPITAL ENCOUNTER (OUTPATIENT)
Dept: RADIOLOGY | Facility: HOSPITAL | Age: 60
Discharge: HOME | End: 2025-04-12
Attending: ORTHOPAEDIC SURGERY
Payer: COMMERCIAL

## 2025-04-12 DIAGNOSIS — Z96.619 PRESENCE OF UNSPECIFIED ARTIFICIAL SHOULDER JOINT: ICD-10-CM

## 2025-04-12 DIAGNOSIS — M75.122 COMPLETE ROTATOR CUFF TEAR OR RUPTURE OF LEFT SHOULDER, NOT SPECIFIED AS TRAUMATIC: ICD-10-CM

## 2025-04-12 DIAGNOSIS — M75.121 COMPLETE ROTATOR CUFF TEAR OR RUPTURE OF RIGHT SHOULDER, NOT SPECIFIED AS TRAUMATIC: ICD-10-CM

## 2025-04-12 PROCEDURE — 73221 MRI JOINT UPR EXTREM W/O DYE: CPT | Mod: 26,RT,, | Performed by: RADIOLOGY

## 2025-04-12 PROCEDURE — 73221 MRI JOINT UPR EXTREM W/O DYE: CPT | Mod: TC,RT

## 2025-04-12 PROCEDURE — 73221 MRI JOINT UPR EXTREM W/O DYE: CPT | Mod: TC,LT

## 2025-04-12 PROCEDURE — 73221 MRI JOINT UPR EXTREM W/O DYE: CPT | Mod: 26,LT,, | Performed by: RADIOLOGY

## 2025-05-06 ENCOUNTER — OFFICE VISIT (OUTPATIENT)
Dept: HEMATOLOGY/ONCOLOGY | Facility: CLINIC | Age: 60
End: 2025-05-06
Payer: COMMERCIAL

## 2025-05-06 ENCOUNTER — LAB VISIT (OUTPATIENT)
Dept: LAB | Facility: HOSPITAL | Age: 60
End: 2025-05-06
Attending: INTERNAL MEDICINE
Payer: COMMERCIAL

## 2025-05-06 VITALS
HEIGHT: 62 IN | WEIGHT: 110 LBS | SYSTOLIC BLOOD PRESSURE: 126 MMHG | RESPIRATION RATE: 16 BRPM | BODY MASS INDEX: 20.24 KG/M2 | OXYGEN SATURATION: 98 % | DIASTOLIC BLOOD PRESSURE: 61 MMHG | HEART RATE: 66 BPM

## 2025-05-06 DIAGNOSIS — C90.30 SOLITARY PLASMACYTOMA, UNSPECIFIED WHETHER REMISSION ACHIEVED: Primary | ICD-10-CM

## 2025-05-06 DIAGNOSIS — S46.211A RUPTURE OF RIGHT BICEPS TENDON, INITIAL ENCOUNTER: ICD-10-CM

## 2025-05-06 DIAGNOSIS — C90.30 SOLITARY PLASMACYTOMA, UNSPECIFIED WHETHER REMISSION ACHIEVED: ICD-10-CM

## 2025-05-06 LAB
ABSOLUTE EOSINOPHIL (OHS): 0.05 K/UL
ABSOLUTE MONOCYTE (OHS): 0.41 K/UL (ref 0.3–1)
ABSOLUTE NEUTROPHIL COUNT (OHS): 2.61 K/UL (ref 1.8–7.7)
ALBUMIN SERPL BCP-MCNC: 3.8 G/DL (ref 3.5–5.2)
ALP SERPL-CCNC: 53 UNIT/L (ref 40–150)
ALT SERPL W/O P-5'-P-CCNC: 9 UNIT/L (ref 10–44)
ANION GAP (OHS): 9 MMOL/L (ref 8–16)
AST SERPL-CCNC: 15 UNIT/L (ref 11–45)
BASOPHILS # BLD AUTO: 0.04 K/UL
BASOPHILS NFR BLD AUTO: 0.8 %
BILIRUB SERPL-MCNC: 0.3 MG/DL (ref 0.1–1)
BUN SERPL-MCNC: 15 MG/DL (ref 8–23)
CALCIUM SERPL-MCNC: 9.1 MG/DL (ref 8.7–10.5)
CHLORIDE SERPL-SCNC: 103 MMOL/L (ref 95–110)
CO2 SERPL-SCNC: 27 MMOL/L (ref 23–29)
CREAT SERPL-MCNC: 0.8 MG/DL (ref 0.5–1.4)
ERYTHROCYTE [DISTWIDTH] IN BLOOD BY AUTOMATED COUNT: 12.8 % (ref 11.5–14.5)
GFR SERPLBLD CREATININE-BSD FMLA CKD-EPI: >60 ML/MIN/1.73/M2
GLUCOSE SERPL-MCNC: 88 MG/DL (ref 70–110)
HCT VFR BLD AUTO: 37.2 % (ref 37–48.5)
HGB BLD-MCNC: 11.9 GM/DL (ref 12–16)
IGA SERPL-MCNC: 79 MG/DL (ref 40–350)
IGG SERPL-MCNC: 793 MG/DL (ref 650–1600)
IGM SERPL-MCNC: 102 MG/DL (ref 50–300)
IMM GRANULOCYTES # BLD AUTO: 0.01 K/UL (ref 0–0.04)
IMM GRANULOCYTES NFR BLD AUTO: 0.2 % (ref 0–0.5)
LYMPHOCYTES # BLD AUTO: 1.7 K/UL (ref 1–4.8)
MCH RBC QN AUTO: 30.3 PG (ref 27–31)
MCHC RBC AUTO-ENTMCNC: 32 G/DL (ref 32–36)
MCV RBC AUTO: 95 FL (ref 82–98)
NUCLEATED RBC (/100WBC) (OHS): 0 /100 WBC
PLATELET # BLD AUTO: 237 K/UL (ref 150–450)
PMV BLD AUTO: 10.1 FL (ref 9.2–12.9)
POTASSIUM SERPL-SCNC: 3.8 MMOL/L (ref 3.5–5.1)
PROT SERPL-MCNC: 6.6 GM/DL (ref 6–8.4)
RBC # BLD AUTO: 3.93 M/UL (ref 4–5.4)
RELATIVE EOSINOPHIL (OHS): 1 %
RELATIVE LYMPHOCYTE (OHS): 35.3 % (ref 18–48)
RELATIVE MONOCYTE (OHS): 8.5 % (ref 4–15)
RELATIVE NEUTROPHIL (OHS): 54.2 % (ref 38–73)
SODIUM SERPL-SCNC: 139 MMOL/L (ref 136–145)
WBC # BLD AUTO: 4.82 K/UL (ref 3.9–12.7)

## 2025-05-06 PROCEDURE — 1159F MED LIST DOCD IN RCRD: CPT | Mod: CPTII,S$GLB,, | Performed by: INTERNAL MEDICINE

## 2025-05-06 PROCEDURE — 85025 COMPLETE CBC W/AUTO DIFF WBC: CPT

## 2025-05-06 PROCEDURE — 36415 COLL VENOUS BLD VENIPUNCTURE: CPT

## 2025-05-06 PROCEDURE — 3074F SYST BP LT 130 MM HG: CPT | Mod: CPTII,S$GLB,, | Performed by: INTERNAL MEDICINE

## 2025-05-06 PROCEDURE — 84520 ASSAY OF UREA NITROGEN: CPT

## 2025-05-06 PROCEDURE — 99214 OFFICE O/P EST MOD 30 MIN: CPT | Mod: S$GLB,,, | Performed by: INTERNAL MEDICINE

## 2025-05-06 PROCEDURE — 83521 IG LIGHT CHAINS FREE EACH: CPT

## 2025-05-06 PROCEDURE — 3008F BODY MASS INDEX DOCD: CPT | Mod: CPTII,S$GLB,, | Performed by: INTERNAL MEDICINE

## 2025-05-06 PROCEDURE — 3078F DIAST BP <80 MM HG: CPT | Mod: CPTII,S$GLB,, | Performed by: INTERNAL MEDICINE

## 2025-05-06 PROCEDURE — 84165 PROTEIN E-PHORESIS SERUM: CPT

## 2025-05-06 PROCEDURE — 82784 ASSAY IGA/IGD/IGG/IGM EACH: CPT | Mod: 59

## 2025-05-06 PROCEDURE — 99999 PR PBB SHADOW E&M-EST. PATIENT-LVL III: CPT | Mod: PBBFAC,,, | Performed by: INTERNAL MEDICINE

## 2025-05-06 RX ORDER — HYDROCODONE BITARTRATE AND ACETAMINOPHEN 5; 325 MG/1; MG/1
1 TABLET ORAL EVERY 6 HOURS PRN
Qty: 30 TABLET | Refills: 0 | Status: SHIPPED | OUTPATIENT
Start: 2025-05-06

## 2025-05-06 RX ORDER — TRAMADOL HYDROCHLORIDE 50 MG/1
50 TABLET ORAL EVERY 6 HOURS PRN
COMMUNITY

## 2025-05-06 NOTE — PROGRESS NOTES
"Clinic Note  05/06/2025      Subjective:       Patient ID: Mary Blair is a 59 y.o. female being seen for solitary plasmacytoma, transfer of care from Dr. Brown.     Chief Complaint: No chief complaint on file.    HPI  Mary Blair is a 59 y.o. female with solitary plasmacytoma of lumbar spine, previously followed by Dr. Brown and treated with RT to L-spine at [facility] in 2018.         Solitary Plasmacytoma History:  She initially presented with back pain in early February 2018.   - Bone scan (2/27/2018) revealed Increased uptake within L3 body corresponding to lytic lesion as seen on 2/16/18 CT.   - Back surgery on 2/28/2018 ([facility]), biopsy of L3 vertebral body lesion revealed lambda restricted plasma cell neoplasm.   - Bone marrow biopsy 3/8/2018 was negative for any malignancy. Skeletal survey 3/21/2018 revealed "Periprosthetic lucency associated with the left proximal femur potentially reflecting osteolysis rather than malignancy".   - She underwent RT to L3, L4 and L5 spine from March to May 8, 2018 for a total of 8 weeks due to suspicion for invasion of adjacent vertebrae.    - Surveillance with imaging (PET/CT and MRI) and Myeloma labs from 2019 - 2023 largely unrevealing.   - PET/CT 03/07/2024: New hypermetabolic lytic lesion in the R occipital condyle and small lytic foci in the R iliac bone   - BMBx 04/10/2024: Benign. Negative for myeloma or lymphoma   - Iliac bone biopsy 5/21/2024: suboptimal sample   - PET/CT 06/20/2024: minimal increased uptake in lesions from 03/07/2024 PET.   - PET/CT 11/22/2024: Some lytic lesions (left ilium, right occipital condyle, right posterior ilium, right iliac bone near sacroiliac joint. Stable L3 vertebral body). Thought to be non-myelomatous lesions      Interval history -  5/6/25  Presents to establish care. Previously on surveillance for solitary plasmacytoma. Last seen Nov 2024  Had a recent fall and tore her left rotator cuff. Also tore her right " johnny  Has not gotten interval PET/CT for evaluation of lytic lesions  Notes fatigue, night sweats, back pain, and neuropathy are worse since last seen.    Past Medical History:   Diagnosis Date    Cancer     Plasmacytoma     Rotator cuff disorder     Spinal stenosis        Past Surgical History:   Procedure Laterality Date    BIOPSY, WITH CT GUIDANCE Right 2024    Procedure: ILIAC BONE [id number];  Surgeon: Harjeet Bro MD;  Location: [facility] CATH LAB;  Service: Radiology;  Laterality: Right;    BONE MARROW BIOPSY Left 04/10/2024    Procedure: Biopsy-bone marrow;  Surgeon: Santa Brown MD;  [facility]  Service: Oncology;  Laterality: Left;  -precall complete-MS     SECTION      corpal tunnel      HERNIA REPAIR      hiatal hernia repair    JOINT REPLACEMENT      left hip x2    ROTATOR CUFF REPAIR      SPINE SURGERY      lumbar    TONSILLECTOMY         Family History   Problem Relation Name Age of Onset    Heart disease Mother      Heart disease Father      No Known Problems Sister      No Known Problems Brother      No Known Problems Maternal Aunt      No Known Problems Maternal Uncle      No Known Problems Paternal Aunt      No Known Problems Paternal Uncle      Cataracts Maternal Grandmother      No Known Problems Maternal Grandfather      No Known Problems Paternal Grandmother      No Known Problems Paternal Grandfather      Amblyopia Neg Hx      Blindness Neg Hx      Cancer Neg Hx      Diabetes Neg Hx      Glaucoma Neg Hx      Hypertension Neg Hx      Macular degeneration Neg Hx      Retinal detachment Neg Hx      Strabismus Neg Hx      Stroke Neg Hx      Thyroid disease Neg Hx         Social History     Socioeconomic History    Marital status: Single   Tobacco Use    Smoking status: Never    Smokeless tobacco: Never   Substance and Sexual Activity    Alcohol use: Yes     Comment: occassionally    Drug use: No    Sexual activity: Yes     Partners: Male      Social Drivers of Health     Financial Resource Strain: Low Risk  (5/2/2025)    Overall Financial Resource Strain (CARDIA)     Difficulty of Paying Living Expenses: Not very hard   Food Insecurity: No Food Insecurity (5/2/2025)    Hunger Vital Sign     Worried About Running Out of Food in the Last Year: Never true     Ran Out of Food in the Last Year: Never true   Transportation Needs: No Transportation Needs (5/2/2025)    PRAPARE - Transportation     Lack of Transportation (Medical): No     Lack of Transportation (Non-Medical): No   Physical Activity: Insufficiently Active (5/2/2025)    Exercise Vital Sign     Days of Exercise per Week: 4 days     Minutes of Exercise per Session: 30 min   Stress: No Stress Concern Present (5/2/2025)    [employer] - Occupational Stress Questionnaire     Feeling of Stress : Not at all   Housing Stability: Low Risk  (5/2/2025)    Housing Stability Vital Sign     Unable to Pay for Housing in the Last Year: No     Number of Times Moved in the Last Year: 0     Homeless in the Last Year: No       Patient's Medications   New Prescriptions    No medications on file   Previous Medications    ACETAMINOPHEN (TYLENOL) 500 MG TABLET    Take 1 tablet (500 mg total) by mouth every 6 (six) hours as needed.    AMITRIPTYLINE (ELAVIL) 10 MG TABLET    Take 1 tablet (10 mg total) by mouth every evening.    CELEBREX 100 MG CAPSULE    100 mg.    CYCLOBENZAPRINE (FLEXERIL) 5 MG TABLET    Take 1 tablet (5 mg total) by mouth 3 (three) times daily as needed for Muscle spasms.    DULOXETINE (CYMBALTA) 60 MG CAPSULE    Take 60 mg by mouth once daily.     LIDOCAINE (LIDODERM) 5 %    Place 1 patch onto the skin once daily. Remove & Discard patch within 12 hours or as directed by MD    ONDANSETRON (ZOFRAN-ODT) 8 MG TBDL    Take 8 mg by mouth 2 (two) times daily as needed.    ONDANSETRON (ZOFRAN-ODT) 8 MG TBDL    Dissolve 1 tablet by mouth four times daily     OXYCODONE-ACETAMINOPHEN (PERCOCET) 5-325 MG PER TABLET    Take 1 tablet by mouth every 6 (six) hours as needed for Pain.    SOLIFENACIN (VESICARE) 10 MG TABLET    Take 10 mg by mouth.    TIZANIDINE (ZANAFLEX) 4 MG TABLET    Take 4 mg by mouth 3 (three) times daily as needed.   Modified Medications    No medications on file   Discontinued Medications    No medications on file       Patient Active Problem List    Diagnosis Date Noted    Bone pain 02/27/2024    Weight loss, abnormal 02/27/2024    Presbyopia 03/05/2021    Episodic tension-type headache, not intractable 09/01/2019    Hypokalemia 09/01/2019    Hypomagnesemia 09/01/2019    Gram negative sepsis     Pyelonephritis 08/30/2019    Plasma cell neoplasm 02/05/2019    Plasmacytoma 02/03/2019      See HPI           Objective:      There were no vitals taken for this visit.  There is no height or weight on file to calculate BMI.    Physical Exam  Constitutional:       Appearance: Normal appearance. She is normal weight.   HENT:      Head: Normocephalic and atraumatic.      Mouth/Throat:      Mouth: Mucous membranes are moist.      Pharynx: Oropharynx is clear.   Eyes:      Extraocular Movements: Extraocular movements intact.      Conjunctiva/sclera: Conjunctivae normal.      Pupils: Pupils are equal, round, and reactive to light.   Pulmonary:      Effort: Pulmonary effort is normal.   Abdominal:      Palpations: Abdomen is soft.   Musculoskeletal:         General: Normal range of motion.      Cervical back: Normal range of motion.      Comments: Right sided angella sign   Skin:     General: Skin is warm.   Neurological:      General: No focal deficit present.      Mental Status: She is alert and oriented to person, place, and time.   Psychiatric:         Mood and Affect: Mood normal.         Behavior: Behavior normal.         Thought Content: Thought content normal.         Lab Results   Component Value Date    WBC 3.86 (L) 11/22/2024    HGB 12.5 11/22/2024    HCT  37.9 11/22/2024    MCV 94 11/22/2024     11/22/2024       Gran # (ANC)   Date Value Ref Range Status   11/22/2024 2.0 1.8 - 7.7 K/uL Final     Gran %   Date Value Ref Range Status   11/22/2024 52.5 38.0 - 73.0 % Final     CMP  Sodium   Date Value Ref Range Status   11/22/2024 140 136 - 145 mmol/L Final     Potassium   Date Value Ref Range Status   11/22/2024 4.0 3.5 - 5.1 mmol/L Final     Chloride   Date Value Ref Range Status   11/22/2024 106 95 - 110 mmol/L Final     CO2   Date Value Ref Range Status   11/22/2024 26 23 - 29 mmol/L Final     Glucose   Date Value Ref Range Status   11/22/2024 91 70 - 110 mg/dL Final     BUN   Date Value Ref Range Status   11/22/2024 14 8 - 23 mg/dL Final     Creatinine   Date Value Ref Range Status   11/22/2024 0.9 0.5 - 1.4 mg/dL Final     Calcium   Date Value Ref Range Status   11/22/2024 9.1 8.7 - 10.5 mg/dL Final     Total Protein   Date Value Ref Range Status   11/22/2024 6.6 6.0 - 8.4 g/dL Final     Albumin   Date Value Ref Range Status   11/22/2024 4.0 3.5 - 5.2 g/dL Final     Total Bilirubin   Date Value Ref Range Status   11/22/2024 0.5 0.1 - 1.0 mg/dL Final     Comment:     For infants and newborns, interpretation of results should be based  on gestational age, weight and in agreement with clinical  observations.    Premature Infant recommended reference ranges:  Up to 24 hours.............<8.0 mg/dL  Up to 48 hours............<12.0 mg/dL  3-5 days..................<15.0 mg/dL  6-29 days.................<15.0 mg/dL       Alkaline Phosphatase   Date Value Ref Range Status   11/22/2024 50 40 - 150 U/L Final     AST   Date Value Ref Range Status   11/22/2024 15 10 - 40 U/L Final     ALT   Date Value Ref Range Status   11/22/2024 9 (L) 10 - 44 U/L Final     Anion Gap   Date Value Ref Range Status   11/22/2024 8 8 - 16 mmol/L Final     eGFR   Date Value Ref Range Status   11/22/2024 >60.0 >60 mL/min/1.73 m^2 Final     IgG   Date Value Ref Range Status   11/22/2024 776  650 - 1600 mg/dL Final     Comment:     IgG Cord Blood Reference Range: 650-1600 mg/dL.     IgA   Date Value Ref Range Status   11/22/2024 90 40 - 350 mg/dL Final     Comment:     IgA Cord Blood Reference Range: <5 mg/dL.     IgM   Date Value Ref Range Status   11/22/2024 105 50 - 300 mg/dL Final     Comment:     IgM Cord Blood Reference Range: <25 mg/dL.     Kappa Free Light Chains   Date Value Ref Range Status   11/22/2024 1.04 0.33 - 1.94 mg/dL Final   02/02/2024 1.38 0.33 - 1.94 mg/dL Final   08/03/2023 1.49 0.33 - 1.94 mg/dL Final     Lambda Free Light Chains   Date Value Ref Range Status   11/22/2024 2.89 (H) 0.57 - 2.63 mg/dL Final   02/02/2024 2.96 (H) 0.57 - 2.63 mg/dL Final   08/03/2023 2.98 (H) 0.57 - 2.63 mg/dL Final     Kappa/Lambda FLC Ratio   Date Value Ref Range Status   11/22/2024 0.36 0.26 - 1.65 Final     Comment:     Undetected antigen excess is a rare event but cannot   be excluded. If these free light chain results do not   agree with other clinical or laboratory findings or   if the sample is from a patient that has previously   demonstrated antigen excess, discuss with the testing   laboratory.   Results should always be interpreted in conjunction   with other laboratory tests and clinical evidence.     02/02/2024 0.47 0.26 - 1.65 Final     Comment:     Undetected antigen excess is a rare event but cannot   be excluded. If these free light chain results do not   agree with other clinical or laboratory findings or   if the sample is from a patient that has previously   demonstrated antigen excess, discuss with the testing   laboratory.   Results should always be interpreted in conjunction   with other laboratory tests and clinical evidence.     08/03/2023 0.50 0.26 - 1.65 Final     Comment:     Undetected antigen excess is a rare event but cannot   be excluded. If these free light chain results do not   agree with other clinical or laboratory findings or   if the sample is from a patient that  has previously   demonstrated antigen excess, discuss with the testing   laboratory.   Results should always be interpreted in conjunction   with other laboratory tests and clinical evidence.       Pathologist Interpretation SPE   Date Value Ref Range Status   11/22/2024 REVIEWED  Final     Comment:       Electronically reviewed and signed by:  Janine Villegas MD  Signed on 11/26/24 at 10:34  Normal total protein, normal pattern.     04/06/2022 REVIEWED  Final     Comment:       Electronically reviewed and signed by:  Silvio Alamo M.D.  Signed on 04/07/22 at 15:30  Normal total protein, normal pattern.      09/15/2020 REVIEWED  Final     Comment:     Electronically reviewed and signed by:  Erum Ovalles D.O.  Signed on 09/16/20 at 21:51  Normal total protein, normal pattern. No discrete paraprotein peaks   identified for quantification.  Interpreted by Erum Ovalles D.O.       Pathologist Interpretation MIRNA   Date Value Ref Range Status   11/22/2024 REVIEWED  Final     Comment:       Electronically reviewed and signed by:  Janine Villegas MD  Signed on 11/26/24 at 10:34  No monoclonal peaks identified.        No results found for this or any previous visit (from the past 2160 hours).    Results for orders placed or performed during the hospital encounter of 04/12/25 (from the past 2160 hours)   MRI Shoulder Without Contrast Left    Impression    Large full-thickness rotator cuff tear, involving the supra and infraspinatus tendons, as above.    Split tear with tenosynovitis of the biceps tendon.    Moderate-sized joint effusion.    Subcoracoid bursitis.      Electronically signed by: Bong Sol MD  Date:    04/13/2025  Time:    09:58   Results for orders placed or performed during the hospital encounter of 04/12/25 (from the past 2160 hours)   MRI Shoulder Without Contrast Right    Impression    Prior rotator cuff repair noted.  Residual tendinosis with partial tearing of the supra and infraspinatus tendons, as  above.    Subacromial fluid/ bursitis.    Moderate AC joint arthropathy.    Intra-articular biceps tendinosis.      Electronically signed by: Bong Sol MD  Date:    04/13/2025  Time:    10:10         Assessment:         1. Solitary plasmacytoma, unspecified whether remission achieved                  Plan:         Mary Blair is a 59 y.o. female being seen for solitary plasmacytoma s/p RT to L-spine (03/2018 - 05/2018).       Solitary Plasmacytoma   She presetned with worsening chronic back pain, found to have plasmacytoma, underwent back surgery ([facility]) in 02/2018. Biopsy showed lambda-plasmacytoma. BMBx and labs have been negative. Underwent RT to L-spine in 03/2018. She has been on surveillance with labs and imaging since then. In March 2024, PET/CT showed new lesions which have been slowly progressing on PET/CT in June 2024. Her BMBx and Labs have been unrevealing. Her care is transferred from Dr. Brown in 08/2024 then to Dr. Freed. Lytic lesions thought to be non-myelomatous lesions however her symtpoms appear to be worsening. Will need CBC/CMP + myeloma labs done today and PET/CT scheduled ASAP.            Kyle Lanier MD  Hematology/Oncology PGY-V

## 2025-05-06 NOTE — LETTER
May 6, 2025        Mary Blair  [address] [place] [address]             [facility] - Hematology 5th Fl  [address]  [facility] [address]  Phone: [phone] Dear Dr. Michel,    I am writing to provide preoperative clearance for Ms. Mary Blair ( 1965), who is scheduled for left reverse total shoulder arthroplasty on 25.    Ms. Blair has demonstrated the ability to perform activities equivalent to greater than 4 metabolic equivalents (METs) without experiencing chest pain or dyspnea. This level of functional capacity indicates a good cardiovascular status and suggests a low risk for perioperative cardiac complications.    The patient has no history of cardiac or cerebrovascular disease, further reducing her risk for perioperative complications.    Additionally, Ms. Blair has a history of isolated plasmacytoma, which has been treated successfully and currently shows no evidence of disease. According to a systematic review and meta-analysis, patients with treated solitary plasmacytoma who show no evidence of disease have favorable outcomes and a low risk of progression to multiple myeloma.    Based on her current health status and functional capacity, I find Ms. Blair to be an appropriate candidate for the planned surgical procedure.    Please feel free to contact me if you require any further information.    Sincerely,     Alexis Appiah MD, FACP  Hematology & Medical Oncology  [facility]

## 2025-05-07 LAB
KAPPA LC FREE SER-MCNC: 0.36 MG/L (ref 0.26–1.65)
KAPPA LC FREE/LAMBDA FREE SER: 1.2 MG/DL (ref 0.33–1.94)
LAMBDA LC FREE SERPL-MCNC: 3.29 MG/DL (ref 0.57–2.63)

## 2025-05-09 LAB
ALBUMIN, SPE (OHS): 4.12 G/DL (ref 3.35–5.55)
ALPHA 1 GLOB (OHS): 0.27 GM/DL (ref 0.17–0.41)
ALPHA 2 GLOB (OHS): 0.58 GM/DL (ref 0.43–0.99)
BETA GLOB (OHS): 0.56 GM/DL (ref 0.5–1.1)
GAMMA GLOBULIN (OHS): 0.68 GM/DL (ref 0.67–1.58)
PROT SERPL-MCNC: 6.2 GM/DL (ref 6–8.4)

## 2025-05-12 LAB — PATHOLOGIST REVIEW - SPE (OHS): NORMAL

## 2025-06-25 ENCOUNTER — HOSPITAL ENCOUNTER (OUTPATIENT)
Dept: RADIOLOGY | Facility: HOSPITAL | Age: 60
Discharge: HOME | End: 2025-06-25
Attending: INTERNAL MEDICINE
Payer: MEDICAID

## 2025-06-25 DIAGNOSIS — C90.30 SOLITARY PLASMACYTOMA, UNSPECIFIED WHETHER REMISSION ACHIEVED: ICD-10-CM

## 2025-06-25 LAB
POCT GLUCOSE: 20 MG/DL (ref 70–110)
POCT GLUCOSE: 75 MG/DL (ref 70–110)

## 2025-06-25 PROCEDURE — 78816 PET IMAGE W/CT FULL BODY: CPT | Mod: 26,PS,, | Performed by: NUCLEAR MEDICINE

## 2025-06-25 PROCEDURE — A9552 F18 FDG: HCPCS | Performed by: INTERNAL MEDICINE

## 2025-06-25 PROCEDURE — 78816 PET IMAGE W/CT FULL BODY: CPT | Mod: TC

## 2025-06-25 RX ORDER — FLUDEOXYGLUCOSE F18 500 MCI/ML
12 INJECTION INTRAVENOUS
Status: COMPLETED | OUTPATIENT
Start: 2025-06-25 | End: 2025-06-25

## 2025-06-25 RX ADMIN — FLUDEOXYGLUCOSE F-18 12.12 MILLICURIE: 500 INJECTION INTRAVENOUS at 09:06

## 2025-08-04 ENCOUNTER — LAB VISIT (OUTPATIENT)
Dept: LAB | Facility: HOSPITAL | Age: 60
End: 2025-08-04
Attending: INTERNAL MEDICINE
Payer: MEDICAID

## 2025-08-04 DIAGNOSIS — C90.30 SOLITARY PLASMACYTOMA, UNSPECIFIED WHETHER REMISSION ACHIEVED: ICD-10-CM

## 2025-08-04 LAB
ABSOLUTE EOSINOPHIL (OHS): 0.09 K/UL
ABSOLUTE MONOCYTE (OHS): 0.51 K/UL (ref 0.3–1)
ABSOLUTE NEUTROPHIL COUNT (OHS): 2.49 K/UL (ref 1.8–7.7)
ALBUMIN SERPL BCP-MCNC: 4.4 G/DL (ref 3.5–5.2)
ALP SERPL-CCNC: 60 UNIT/L (ref 40–150)
ALT SERPL W/O P-5'-P-CCNC: 9 UNIT/L (ref 10–44)
ANION GAP (OHS): 6 MMOL/L (ref 8–16)
AST SERPL-CCNC: 23 UNIT/L (ref 11–45)
BASOPHILS # BLD AUTO: 0.05 K/UL
BASOPHILS NFR BLD AUTO: 1 %
BILIRUB SERPL-MCNC: 0.2 MG/DL (ref 0.1–1)
BUN SERPL-MCNC: 12 MG/DL (ref 8–23)
CALCIUM SERPL-MCNC: 9.3 MG/DL (ref 8.7–10.5)
CHLORIDE SERPL-SCNC: 107 MMOL/L (ref 95–110)
CO2 SERPL-SCNC: 29 MMOL/L (ref 23–29)
CREAT SERPL-MCNC: 0.9 MG/DL (ref 0.5–1.4)
ERYTHROCYTE [DISTWIDTH] IN BLOOD BY AUTOMATED COUNT: 13.2 % (ref 11.5–14.5)
GFR SERPLBLD CREATININE-BSD FMLA CKD-EPI: >60 ML/MIN/1.73/M2
GLUCOSE SERPL-MCNC: 93 MG/DL (ref 70–110)
HCT VFR BLD AUTO: 36.9 % (ref 37–48.5)
HGB BLD-MCNC: 11.6 GM/DL (ref 12–16)
IMM GRANULOCYTES # BLD AUTO: 0.01 K/UL (ref 0–0.04)
IMM GRANULOCYTES NFR BLD AUTO: 0.2 % (ref 0–0.5)
LYMPHOCYTES # BLD AUTO: 2.04 K/UL (ref 1–4.8)
MCH RBC QN AUTO: 30.3 PG (ref 27–31)
MCHC RBC AUTO-ENTMCNC: 31.4 G/DL (ref 32–36)
MCV RBC AUTO: 96 FL (ref 82–98)
NUCLEATED RBC (/100WBC) (OHS): 0 /100 WBC
PLATELET # BLD AUTO: 201 K/UL (ref 150–450)
PMV BLD AUTO: 10 FL (ref 9.2–12.9)
POTASSIUM SERPL-SCNC: 4.1 MMOL/L (ref 3.5–5.1)
PROT SERPL-MCNC: 6.9 GM/DL (ref 6–8.4)
RBC # BLD AUTO: 3.83 M/UL (ref 4–5.4)
RELATIVE EOSINOPHIL (OHS): 1.7 %
RELATIVE LYMPHOCYTE (OHS): 39.3 % (ref 18–48)
RELATIVE MONOCYTE (OHS): 9.8 % (ref 4–15)
RELATIVE NEUTROPHIL (OHS): 48 % (ref 38–73)
SODIUM SERPL-SCNC: 142 MMOL/L (ref 136–145)
WBC # BLD AUTO: 5.19 K/UL (ref 3.9–12.7)

## 2025-08-04 PROCEDURE — 82784 ASSAY IGA/IGD/IGG/IGM EACH: CPT | Mod: 59

## 2025-08-04 PROCEDURE — 85025 COMPLETE CBC W/AUTO DIFF WBC: CPT

## 2025-08-04 PROCEDURE — 36415 COLL VENOUS BLD VENIPUNCTURE: CPT

## 2025-08-04 PROCEDURE — 84165 PROTEIN E-PHORESIS SERUM: CPT

## 2025-08-04 PROCEDURE — 83521 IG LIGHT CHAINS FREE EACH: CPT

## 2025-08-04 PROCEDURE — 80053 COMPREHEN METABOLIC PANEL: CPT

## 2025-08-05 ENCOUNTER — OFFICE VISIT (OUTPATIENT)
Dept: HEMATOLOGY/ONCOLOGY | Facility: CLINIC | Age: 60
End: 2025-08-05
Payer: MEDICAID

## 2025-08-05 VITALS
DIASTOLIC BLOOD PRESSURE: 64 MMHG | HEIGHT: 62 IN | BODY MASS INDEX: 20.24 KG/M2 | RESPIRATION RATE: 16 BRPM | WEIGHT: 110 LBS | OXYGEN SATURATION: 99 % | HEART RATE: 67 BPM | SYSTOLIC BLOOD PRESSURE: 115 MMHG

## 2025-08-05 DIAGNOSIS — H53.8 BLURRY VISION: ICD-10-CM

## 2025-08-05 DIAGNOSIS — C90.30 PLASMACYTOMA, UNSPECIFIED PLASMACYTOMA TYPE, UNSPECIFIED WHETHER REMISSION ACHIEVED: Primary | ICD-10-CM

## 2025-08-05 DIAGNOSIS — D49.89 PLASMA CELL NEOPLASM: ICD-10-CM

## 2025-08-05 DIAGNOSIS — M19.90 OSTEOARTHRITIS, UNSPECIFIED OSTEOARTHRITIS TYPE, UNSPECIFIED SITE: ICD-10-CM

## 2025-08-05 DIAGNOSIS — R42 VERTIGO: ICD-10-CM

## 2025-08-05 LAB
IGA SERPL-MCNC: 82 MG/DL (ref 40–350)
IGG SERPL-MCNC: 796 MG/DL (ref 650–1600)
IGM SERPL-MCNC: 101 MG/DL (ref 50–300)

## 2025-08-05 PROCEDURE — 1160F RVW MEDS BY RX/DR IN RCRD: CPT | Mod: CPTII,,, | Performed by: INTERNAL MEDICINE

## 2025-08-05 PROCEDURE — 99999 PR PBB SHADOW E&M-EST. PATIENT-LVL IV: CPT | Mod: PBBFAC,,, | Performed by: INTERNAL MEDICINE

## 2025-08-05 PROCEDURE — 99215 OFFICE O/P EST HI 40 MIN: CPT | Mod: S$PBB,,, | Performed by: INTERNAL MEDICINE

## 2025-08-05 PROCEDURE — 3078F DIAST BP <80 MM HG: CPT | Mod: CPTII,,, | Performed by: INTERNAL MEDICINE

## 2025-08-05 PROCEDURE — 3074F SYST BP LT 130 MM HG: CPT | Mod: CPTII,,, | Performed by: INTERNAL MEDICINE

## 2025-08-05 PROCEDURE — 99214 OFFICE O/P EST MOD 30 MIN: CPT | Mod: PBBFAC | Performed by: INTERNAL MEDICINE

## 2025-08-05 PROCEDURE — 3008F BODY MASS INDEX DOCD: CPT | Mod: CPTII,,, | Performed by: INTERNAL MEDICINE

## 2025-08-05 PROCEDURE — 1159F MED LIST DOCD IN RCRD: CPT | Mod: CPTII,,, | Performed by: INTERNAL MEDICINE

## 2025-08-05 RX ORDER — HYDROCODONE BITARTRATE AND ACETAMINOPHEN 5; 325 MG/1; MG/1
1 TABLET ORAL EVERY 6 HOURS PRN
Qty: 30 TABLET | Refills: 0 | Status: SHIPPED | OUTPATIENT
Start: 2025-08-05

## 2025-08-05 NOTE — PROGRESS NOTES
"Clinic Note  08/05/2025      Subjective:       Patient ID: Mary Blair is a 60 y.o. female being seen for solitary plasmacytoma, transfer of care from Dr. Brown.     Chief Complaint: No chief complaint on file.    HPI  Mary Blair is a 60 y.o. female with solitary plasmacytoma of lumbar spine, previously followed by Dr. Brown and treated with RT to L-spine at [facility] in 2018.         Solitary Plasmacytoma History:  She initially presented with back pain in early February 2018.   - Bone scan (2/27/2018) revealed Increased uptake within L3 body corresponding to lytic lesion as seen on 2/16/18 CT.   - Back surgery on 2/28/2018 ([facility]), biopsy of L3 vertebral body lesion revealed lambda restricted plasma cell neoplasm.   - Bone marrow biopsy 3/8/2018 was negative for any malignancy. Skeletal survey 3/21/2018 revealed "Periprosthetic lucency associated with the left proximal femur potentially reflecting osteolysis rather than malignancy".   - She underwent RT to L3, L4 and L5 spine from March to May 8, 2018 for a total of 8 weeks due to suspicion for invasion of adjacent vertebrae.    - Surveillance with imaging (PET/CT and MRI) and Myeloma labs from 2019 - 2023 largely unrevealing.   - PET/CT 03/07/2024: New hypermetabolic lytic lesion in the R occipital condyle and small lytic foci in the R iliac bone   - BMBx 04/10/2024: Benign. Negative for myeloma or lymphoma   - Iliac bone biopsy 5/21/2024: suboptimal sample   - PET/CT 06/20/2024: minimal increased uptake in lesions from 03/07/2024 PET.   - PET/CT 11/22/2024: Some lytic lesions (left ilium, right occipital condyle, right posterior ilium, right iliac bone near sacroiliac joint. Stable L3 vertebral body). Thought to be non-myelomatous lesions  - PET/CT 6/25/2025:  some scattered hypermetabolic lesions with decreased tracer activity- right occipital, left ilium, roght posterior ileum, left transverse process of C4 (unchanged). Stable L3 vertebral " body       Interval history -  25  Presents to establish care. Previously on surveillance for solitary plasmacytoma. Last seen 2024  Had a recent fall and tore her left rotator cuff. Also tore her right bicep  Has not gotten interval PET/CT for evaluation of lytic lesions  Notes fatigue, night sweats, back pain, and neuropathy are worse since last seen.    Interval history - 25  She is seen for follow-up today for solitary plasmacytoma. She reports bilateral hand pain, bilateral shoulder pain as well as hip pain. No reports to have fallen in July. She fell on her left side and landed on her left side.    Given her symptoms on prior visit, the consideration for active myeloma was explored. Her laboratory and imaging evaluation were stable.       Past Medical History:   Diagnosis Date    Cancer     Plasmacytoma     Rotator cuff disorder     Spinal stenosis        Past Surgical History:   Procedure Laterality Date    BIOPSY, WITH CT GUIDANCE Right 2024    Procedure: ILIAC BONE [id number];  Surgeon: Harjeet Bro MD;  Location: [facility] CATH LAB;  Service: Radiology;  Laterality: Right;    BONE MARROW BIOPSY Left 04/10/2024    Procedure: Biopsy-bone marrow;  Surgeon: Santa Brown MD;  Location: [facility] ENDO ([address] [place] [address]);  Service: Oncology;  Laterality: Left;  -precall complete-MS     SECTION      corpal tunnel      HERNIA REPAIR      hiatal hernia repair    JOINT REPLACEMENT      left hip x2    ROTATOR CUFF REPAIR      SPINE SURGERY      lumbar    TONSILLECTOMY         Family History   Problem Relation Name Age of Onset    Heart disease Mother      Heart disease Father      No Known Problems Sister      No Known Problems Brother      No Known Problems Maternal Aunt      No Known Problems Maternal Uncle      No Known Problems Paternal Aunt      No Known Problems Paternal Uncle      Cataracts Maternal Grandmother      No Known Problems Maternal Grandfather      No Known Problems  Paternal Grandmother      No Known Problems Paternal Grandfather      Amblyopia Neg Hx      Blindness Neg Hx      Cancer Neg Hx      Diabetes Neg Hx      Glaucoma Neg Hx      Hypertension Neg Hx      Macular degeneration Neg Hx      Retinal detachment Neg Hx      Strabismus Neg Hx      Stroke Neg Hx      Thyroid disease Neg Hx         Social History     Socioeconomic History    Marital status: Single   Tobacco Use    Smoking status: Never    Smokeless tobacco: Never   Substance and Sexual Activity    Alcohol use: Yes     Comment: occassionally    Drug use: No    Sexual activity: Yes     Partners: Male     Social Drivers of Health     Financial Resource Strain: Low Risk  (5/2/2025)    Overall Financial Resource Strain (CARDIA)     Difficulty of Paying Living Expenses: Not very hard   Food Insecurity: No Food Insecurity (5/2/2025)    Hunger Vital Sign     Worried About Running Out of Food in the Last Year: Never true     Ran Out of Food in the Last Year: Never true   Transportation Needs: No Transportation Needs (5/2/2025)    PRAPARE - Transportation     Lack of Transportation (Medical): No     Lack of Transportation (Non-Medical): No   Physical Activity: Insufficiently Active (5/2/2025)    Exercise Vital Sign     Days of Exercise per Week: 4 days     Minutes of Exercise per Session: 30 min   Stress: No Stress Concern Present (5/2/2025)    [employer] - Occupational Stress Questionnaire     Feeling of Stress : Not at all   Housing Stability: Low Risk  (5/2/2025)    Housing Stability Vital Sign     Unable to Pay for Housing in the Last Year: No     Number of Times Moved in the Last Year: 0     Homeless in the Last Year: No       Patient's Medications   New Prescriptions    HYDROCODONE-ACETAMINOPHEN (NORCO) 5-325 MG PER TABLET    Take 1 tablet by mouth every 6 (six) hours as needed for Pain.   Previous Medications    ACETAMINOPHEN (TYLENOL) 500 MG TABLET    Take 1 tablet (500 mg total) by  "mouth every 6 (six) hours as needed.    CYCLOBENZAPRINE (FLEXERIL) 5 MG TABLET    Take 1 tablet (5 mg total) by mouth 3 (three) times daily as needed for Muscle spasms.    DULOXETINE (CYMBALTA) 60 MG CAPSULE    Take 60 mg by mouth once daily.     DULOXETINE (CYMBALTA) 60 MG CAPSULE    Take 1 capsule by mouth daily    ONDANSETRON (ZOFRAN-ODT) 8 MG TBDL    Dissolve 1 tablet by mouth 4 times daily    SOLIFENACIN (VESICARE) 10 MG TABLET    Take 10 mg by mouth.    SOLIFENACIN (VESICARE) 10 MG TABLET    Take 1 tablet by mouth daily    TRAMADOL (ULTRAM) 50 MG TABLET    Take 50 mg by mouth every 6 (six) hours as needed for Pain.   Modified Medications    No medications on file   Discontinued Medications    HYDROCODONE-ACETAMINOPHEN (NORCO) 5-325 MG PER TABLET    Take 1 tablet by mouth every 6 (six) hours as needed for Pain.       Patient Active Problem List    Diagnosis Date Noted    Bone pain 02/27/2024    Weight loss, abnormal 02/27/2024    Presbyopia 03/05/2021    Episodic tension-type headache, not intractable 09/01/2019    Hypokalemia 09/01/2019    Hypomagnesemia 09/01/2019    Gram negative sepsis     Pyelonephritis 08/30/2019    Plasma cell neoplasm 02/05/2019    Plasmacytoma 02/03/2019      See HPI           Objective:      /64 (BP Location: Left arm, Patient Position: Sitting)   Pulse 67   Resp 16   Ht 5' 2" (1.575 m)   Wt 49.9 kg (110 lb) Comment: per patient  SpO2 99%   BMI 20.12 kg/m²   Body mass index is 20.12 kg/m².    Physical Exam  Constitutional:       Appearance: Normal appearance. She is normal weight.   HENT:      Head: Normocephalic and atraumatic.      Mouth/Throat:      Mouth: Mucous membranes are moist.      Pharynx: Oropharynx is clear.   Eyes:      Extraocular Movements: Extraocular movements intact.      Conjunctiva/sclera: Conjunctivae normal.      Pupils: Pupils are equal, round, and reactive to light.   Pulmonary:      Effort: Pulmonary effort is normal.   Abdominal:      Palpations: " Abdomen is soft.   Musculoskeletal:         General: Normal range of motion.      Cervical back: Normal range of motion.      Comments: Right sided angella sign   Skin:     General: Skin is warm.   Neurological:      General: No focal deficit present.      Mental Status: She is alert and oriented to person, place, and time.   Psychiatric:         Mood and Affect: Mood normal.         Behavior: Behavior normal.         Thought Content: Thought content normal.         Lab Results   Component Value Date    WBC 5.19 08/04/2025    HGB 11.6 (L) 08/04/2025    HCT 36.9 (L) 08/04/2025    MCV 96 08/04/2025     08/04/2025       Gran # (ANC)   Date Value Ref Range Status   11/22/2024 2.0 1.8 - 7.7 K/uL Final     Gran %   Date Value Ref Range Status   11/22/2024 52.5 38.0 - 73.0 % Final     CMP  Sodium   Date Value Ref Range Status   08/04/2025 142 136 - 145 mmol/L Final   05/13/2025 142 135 - 146 mmol/L Final   11/22/2024 140 136 - 145 mmol/L Final     Potassium   Date Value Ref Range Status   08/04/2025 4.1 3.5 - 5.1 mmol/L Final   05/13/2025 4.0 3.6 - 5.2 mmol/L Final   11/22/2024 4.0 3.5 - 5.1 mmol/L Final     Chloride   Date Value Ref Range Status   08/04/2025 107 95 - 110 mmol/L Final   11/22/2024 106 95 - 110 mmol/L Final     CO2   Date Value Ref Range Status   08/04/2025 29 23 - 29 mmol/L Final   11/22/2024 26 23 - 29 mmol/L Final     Carbon Dioxide   Date Value Ref Range Status   05/13/2025 26 24 - 32 mmol/L Final     Glucose   Date Value Ref Range Status   08/04/2025 93 70 - 110 mg/dL Final   11/22/2024 91 70 - 110 mg/dL Final     BUN   Date Value Ref Range Status   08/04/2025 12 8 - 23 mg/dL Final     Creatinine   Date Value Ref Range Status   08/04/2025 0.9 0.5 - 1.4 mg/dL Final     Calcium   Date Value Ref Range Status   08/04/2025 9.3 8.7 - 10.5 mg/dL Final   05/13/2025 7.8 (L) 8.4 - 10.3 mg/dL Final   11/22/2024 9.1 8.7 - 10.5 mg/dL Final     Protein Total   Date Value Ref Range Status   08/04/2025 6.9 6.0 -  8.4 gm/dL Final     Total Protein   Date Value Ref Range Status   11/22/2024 6.6 6.0 - 8.4 g/dL Final     Albumin   Date Value Ref Range Status   08/04/2025 4.4 3.5 - 5.2 g/dL Final   05/13/2025 3.9 3.4 - 5.0 g/dL Final   11/22/2024 4.0 3.5 - 5.2 g/dL Final     Total Bilirubin   Date Value Ref Range Status   05/13/2025 0.3 <1.3 mg/dL Final   11/22/2024 0.5 0.1 - 1.0 mg/dL Final     Comment:     For infants and newborns, interpretation of results should be based  on gestational age, weight and in agreement with clinical  observations.    Premature Infant recommended reference ranges:  Up to 24 hours.............<8.0 mg/dL  Up to 48 hours............<12.0 mg/dL  3-5 days..................<15.0 mg/dL  6-29 days.................<15.0 mg/dL       Bilirubin Total   Date Value Ref Range Status   08/04/2025 0.2 0.1 - 1.0 mg/dL Final     Comment:     For infants and newborns, interpretation of results should be based   on gestational age, weight and in agreement with clinical   observations.    Premature Infant recommended reference ranges:   0-24 hours:  <8.0 mg/dL   24-48 hours: <12.0 mg/dL   3-5 days:    <15.0 mg/dL   6-29 days:   <15.0 mg/dL     Alkaline Phosphatase   Date Value Ref Range Status   11/22/2024 50 40 - 150 U/L Final     ALP   Date Value Ref Range Status   08/04/2025 60 40 - 150 unit/L Final     AST   Date Value Ref Range Status   08/04/2025 23 11 - 45 unit/L Final   05/13/2025 16 <45 U/L Final   11/22/2024 15 10 - 40 U/L Final     ALT   Date Value Ref Range Status   08/04/2025 9 (L) 10 - 44 unit/L Final   05/13/2025 10 <46 U/L Final   11/22/2024 9 (L) 10 - 44 U/L Final     Anion Gap   Date Value Ref Range Status   08/04/2025 6 (L) 8 - 16 mmol/L Final     eGFR   Date Value Ref Range Status   08/04/2025 >60 >60 mL/min/1.73/m2 Final     Comment:     Estimated GFR calculated using the CKD-EPI creatinine (2021) equation.   11/22/2024 >60.0 >60 mL/min/1.73 m^2 Final     IgG   Date Value Ref Range Status    11/22/2024 776 650 - 1600 mg/dL Final     Comment:     IgG Cord Blood Reference Range: 650-1600 mg/dL.     IgG Level   Date Value Ref Range Status   08/04/2025 796 650 - 1,600 mg/dL Final     Comment:     IgG Cord Blood Reference Range: 650-1600 mg/dL.     IgA Level   Date Value Ref Range Status   08/04/2025 82 40 - 350 mg/dL Final     Comment:     IgA Cord Blood Reference Range: <5 mg/dL.     IgM   Date Value Ref Range Status   11/22/2024 105 50 - 300 mg/dL Final     Comment:     IgM Cord Blood Reference Range: <25 mg/dL.     IgM Level   Date Value Ref Range Status   08/04/2025 101 50 - 300 mg/dL Final     Comment:     IgM Cord Blood Reference Range: <25 mg/dL.     Kappa Free Light Chains   Date Value Ref Range Status   11/22/2024 1.04 0.33 - 1.94 mg/dL Final   02/02/2024 1.38 0.33 - 1.94 mg/dL Final   08/03/2023 1.49 0.33 - 1.94 mg/dL Final     Lambda Free Light Chains   Date Value Ref Range Status   11/22/2024 2.89 (H) 0.57 - 2.63 mg/dL Final   02/02/2024 2.96 (H) 0.57 - 2.63 mg/dL Final   08/03/2023 2.98 (H) 0.57 - 2.63 mg/dL Final     Kappa/Lambda FLC Ratio   Date Value Ref Range Status   11/22/2024 0.36 0.26 - 1.65 Final     Comment:     Undetected antigen excess is a rare event but cannot   be excluded. If these free light chain results do not   agree with other clinical or laboratory findings or   if the sample is from a patient that has previously   demonstrated antigen excess, discuss with the testing   laboratory.   Results should always be interpreted in conjunction   with other laboratory tests and clinical evidence.     02/02/2024 0.47 0.26 - 1.65 Final     Comment:     Undetected antigen excess is a rare event but cannot   be excluded. If these free light chain results do not   agree with other clinical or laboratory findings or   if the sample is from a patient that has previously   demonstrated antigen excess, discuss with the testing   laboratory.   Results should always be interpreted in  conjunction   with other laboratory tests and clinical evidence.     08/03/2023 0.50 0.26 - 1.65 Final     Comment:     Undetected antigen excess is a rare event but cannot   be excluded. If these free light chain results do not   agree with other clinical or laboratory findings or   if the sample is from a patient that has previously   demonstrated antigen excess, discuss with the testing   laboratory.   Results should always be interpreted in conjunction   with other laboratory tests and clinical evidence.       Pathologist Interpretation SPE   Date Value Ref Range Status   11/22/2024 REVIEWED  Final     Comment:       Electronically reviewed and signed by:  Janine Villegas MD  Signed on 11/26/24 at 10:34  Normal total protein, normal pattern.     04/06/2022 REVIEWED  Final     Comment:       Electronically reviewed and signed by:  Silvio Alamo M.D.  Signed on 04/07/22 at 15:30  Normal total protein, normal pattern.      09/15/2020 REVIEWED  Final     Comment:     Electronically reviewed and signed by:  Erum Ovalles D.O.  Signed on 09/16/20 at 21:51  Normal total protein, normal pattern. No discrete paraprotein peaks   identified for quantification.  Interpreted by Erum Ovalles D.O.       Pathologist Interpretation MIRNA   Date Value Ref Range Status   11/22/2024 REVIEWED  Final     Comment:       Electronically reviewed and signed by:  Janine Villegas MD  Signed on 11/26/24 at 10:34  No monoclonal peaks identified.        Results for orders placed or performed during the hospital encounter of 06/25/25 (from the past 2160 hours)   NM PET CT FDG Whole Body    Impression    Stable hypermetabolic lesions in the axial skeleton as above.    Stable distribution of disease index lesions demonstrating decreased tracer activity compared to prior.    No new hypermetabolic lesions elsewhere.    Electronically signed by resident: Brian Thompson  Date:    06/25/2025  Time:    10:41    Electronically signed by: Chelsey  Kristian  Date:    06/25/2025  Time:    11:13       No results found for this or any previous visit (from the past 2160 hours).        Assessment:         1. Plasmacytoma, unspecified plasmacytoma type, unspecified whether remission achieved  Comprehensive Metabolic Panel    CBC Auto Differential    Immunoglobulins (IgG, IgA, IgM) Quantitative    Immunoglobulin Free LT Chains Blood    Protein Electrophoresis, Serum      2. Plasma cell neoplasm        3. Blurry vision  Ambulatory referral/consult to Ophthalmology      4. Vertigo  Ambulatory referral/consult to ENT      5. Osteoarthritis, unspecified osteoarthritis type, unspecified site  HYDROcodone-acetaminophen (NORCO) 5-325 mg per tablet    Ambulatory referral/consult to Rheumatology                  Plan:         Mary Blair is a 60 y.o. female being seen for solitary plasmacytoma s/p RT to L-spine (03/2018 - 05/2018).       Solitary Plasmacytoma   She presented with worsening chronic back pain, found to have plasmacytoma, underwent back surgery ([facility]) in 02/2018. Biopsy showed lambda-plasmacytoma. BMBx and labs have been negative. Underwent RT to L-spine in 03/2018. She has been on surveillance with labs and imaging since then. In March 2024, PET/CT showed new lesions which have been slowly progressing on PET/CT in June 2024. Her BMBx and Labs have been unrevealing. Her care is transferred from Dr. Brown in 08/2024 then to Dr. Freed. Lytic lesions thought to be non-myelomatous lesions however her symtpoms appear to be worsening. Fortunately imaging and serological evaluation remains normal.    Most recent labs appear normal. No hypermetabolic lesion concerning for myeloma seen on PET/CT.     Follow up recent myeloma labs.   - Will plan on labs every 3 months - CBC, CMP, SPEP, MIRNA, SFLC.  - Repeat PET-CT in 6 month (December 2025)  - Requests norco refill.        Vertigo  - Likely BPPV  - Will refer to ENT    Diffuse musculoskeletal pain  -  Likely related to fibromyalgia  - Will refer to rheumatology  - Continue cymbalta.      Iván Moreno MD  Hematology & Oncology Fellow, PGY-VI  [facility]

## 2025-08-06 LAB
ALBUMIN, SPE (OHS): 4.23 G/DL (ref 3.35–5.55)
ALPHA 1 GLOB (OHS): 0.28 GM/DL (ref 0.17–0.41)
ALPHA 2 GLOB (OHS): 0.61 GM/DL (ref 0.43–0.99)
BETA GLOB (OHS): 0.56 GM/DL (ref 0.5–1.1)
GAMMA GLOBULIN (OHS): 0.72 GM/DL (ref 0.67–1.58)
KAPPA LC FREE SER-MCNC: 0.33 MG/L (ref 0.26–1.65)
KAPPA LC FREE/LAMBDA FREE SER: 1.19 MG/DL (ref 0.33–1.94)
LAMBDA LC FREE SERPL-MCNC: 3.62 MG/DL (ref 0.57–2.63)
PROT SERPL-MCNC: 6.4 GM/DL (ref 6–8.4)

## 2025-08-07 LAB — PATHOLOGIST REVIEW - SPE (OHS): NORMAL

## 2025-08-12 ENCOUNTER — TELEPHONE (OUTPATIENT)
Dept: OTOLARYNGOLOGY | Facility: CLINIC | Age: 60
End: 2025-08-12
Payer: MEDICAID